# Patient Record
Sex: MALE | Race: WHITE | NOT HISPANIC OR LATINO | Employment: FULL TIME | URBAN - METROPOLITAN AREA
[De-identification: names, ages, dates, MRNs, and addresses within clinical notes are randomized per-mention and may not be internally consistent; named-entity substitution may affect disease eponyms.]

---

## 2020-05-13 ENCOUNTER — HOSPITAL ENCOUNTER (INPATIENT)
Facility: MEDICAL CENTER | Age: 60
LOS: 22 days | DRG: 100 | End: 2020-06-05
Attending: EMERGENCY MEDICINE | Admitting: HOSPITALIST
Payer: OTHER MISCELLANEOUS

## 2020-05-13 ENCOUNTER — APPOINTMENT (OUTPATIENT)
Dept: RADIOLOGY | Facility: MEDICAL CENTER | Age: 60
DRG: 100 | End: 2020-05-13
Attending: EMERGENCY MEDICINE
Payer: OTHER MISCELLANEOUS

## 2020-05-13 DIAGNOSIS — G93.40 ENCEPHALOPATHY ACUTE: ICD-10-CM

## 2020-05-13 DIAGNOSIS — E87.6 HYPOKALEMIA: ICD-10-CM

## 2020-05-13 DIAGNOSIS — I10 ESSENTIAL HYPERTENSION: ICD-10-CM

## 2020-05-13 DIAGNOSIS — C71.9 GBM (GLIOBLASTOMA MULTIFORME) (HCC): ICD-10-CM

## 2020-05-13 DIAGNOSIS — R73.9 HYPERGLYCEMIA: ICD-10-CM

## 2020-05-13 DIAGNOSIS — D72.829 LEUKOCYTOSIS, UNSPECIFIED TYPE: ICD-10-CM

## 2020-05-13 DIAGNOSIS — G40.901 STATUS EPILEPTICUS (HCC): ICD-10-CM

## 2020-05-13 DIAGNOSIS — E87.1 HYPONATREMIA: ICD-10-CM

## 2020-05-13 LAB
ABO GROUP BLD: NORMAL
ANISOCYTOSIS BLD QL SMEAR: ABNORMAL
APTT PPP: 26 SEC (ref 24.7–36)
BASOPHILS # BLD AUTO: 0.9 % (ref 0–1.8)
BASOPHILS # BLD: 0.05 K/UL (ref 0–0.12)
BLD GP AB SCN SERPL QL: NORMAL
EOSINOPHIL # BLD AUTO: 0 K/UL (ref 0–0.51)
EOSINOPHIL NFR BLD: 0 % (ref 0–6.9)
ERYTHROCYTE [DISTWIDTH] IN BLOOD BY AUTOMATED COUNT: 49.1 FL (ref 35.9–50)
HCT VFR BLD AUTO: 40.3 % (ref 42–52)
HGB BLD-MCNC: 14 G/DL (ref 14–18)
INR PPP: 0.87 (ref 0.87–1.13)
LYMPHOCYTES # BLD AUTO: 0.95 K/UL (ref 1–4.8)
LYMPHOCYTES NFR BLD: 15.6 % (ref 22–41)
MANUAL DIFF BLD: NORMAL
MCH RBC QN AUTO: 32 PG (ref 27–33)
MCHC RBC AUTO-ENTMCNC: 34.7 G/DL (ref 33.7–35.3)
MCV RBC AUTO: 92.2 FL (ref 81.4–97.8)
MICROCYTES BLD QL SMEAR: ABNORMAL
MONOCYTES # BLD AUTO: 0.37 K/UL (ref 0–0.85)
MONOCYTES NFR BLD AUTO: 6.1 % (ref 0–13.4)
MORPHOLOGY BLD-IMP: NORMAL
MYELOCYTES NFR BLD MANUAL: 1.7 %
NEUTROPHILS # BLD AUTO: 4.62 K/UL (ref 1.82–7.42)
NEUTROPHILS NFR BLD: 74.8 % (ref 44–72)
NEUTS BAND NFR BLD MANUAL: 0.9 % (ref 0–10)
NRBC # BLD AUTO: 0 K/UL
NRBC BLD-RTO: 0 /100 WBC
OVALOCYTES BLD QL SMEAR: NORMAL
PLATELET # BLD AUTO: 155 K/UL (ref 164–446)
PLATELET BLD QL SMEAR: NORMAL
PMV BLD AUTO: 10.6 FL (ref 9–12.9)
POIKILOCYTOSIS BLD QL SMEAR: NORMAL
POLYCHROMASIA BLD QL SMEAR: NORMAL
PROTHROMBIN TIME: 12 SEC (ref 12–14.6)
RBC # BLD AUTO: 4.37 M/UL (ref 4.7–6.1)
RBC BLD AUTO: PRESENT
RH BLD: NORMAL
WBC # BLD AUTO: 6.1 K/UL (ref 4.8–10.8)

## 2020-05-13 PROCEDURE — 85007 BL SMEAR W/DIFF WBC COUNT: CPT

## 2020-05-13 PROCEDURE — 86850 RBC ANTIBODY SCREEN: CPT

## 2020-05-13 PROCEDURE — 70498 CT ANGIOGRAPHY NECK: CPT

## 2020-05-13 PROCEDURE — 85027 COMPLETE CBC AUTOMATED: CPT

## 2020-05-13 PROCEDURE — 94760 N-INVAS EAR/PLS OXIMETRY 1: CPT

## 2020-05-13 PROCEDURE — 96375 TX/PRO/DX INJ NEW DRUG ADDON: CPT

## 2020-05-13 PROCEDURE — 84484 ASSAY OF TROPONIN QUANT: CPT

## 2020-05-13 PROCEDURE — 80053 COMPREHEN METABOLIC PANEL: CPT

## 2020-05-13 PROCEDURE — 85730 THROMBOPLASTIN TIME PARTIAL: CPT

## 2020-05-13 PROCEDURE — 700111 HCHG RX REV CODE 636 W/ 250 OVERRIDE (IP): Performed by: EMERGENCY MEDICINE

## 2020-05-13 PROCEDURE — 36415 COLL VENOUS BLD VENIPUNCTURE: CPT

## 2020-05-13 PROCEDURE — 71045 X-RAY EXAM CHEST 1 VIEW: CPT

## 2020-05-13 PROCEDURE — 93005 ELECTROCARDIOGRAM TRACING: CPT | Performed by: EMERGENCY MEDICINE

## 2020-05-13 PROCEDURE — 85610 PROTHROMBIN TIME: CPT

## 2020-05-13 PROCEDURE — 86900 BLOOD TYPING SEROLOGIC ABO: CPT

## 2020-05-13 PROCEDURE — 99285 EMERGENCY DEPT VISIT HI MDM: CPT

## 2020-05-13 PROCEDURE — 70496 CT ANGIOGRAPHY HEAD: CPT

## 2020-05-13 PROCEDURE — 70450 CT HEAD/BRAIN W/O DYE: CPT

## 2020-05-13 PROCEDURE — 86901 BLOOD TYPING SEROLOGIC RH(D): CPT

## 2020-05-13 PROCEDURE — 700117 HCHG RX CONTRAST REV CODE 255: Performed by: EMERGENCY MEDICINE

## 2020-05-13 RX ORDER — DEXAMETHASONE SODIUM PHOSPHATE 4 MG/ML
4 INJECTION, SOLUTION INTRA-ARTICULAR; INTRALESIONAL; INTRAMUSCULAR; INTRAVENOUS; SOFT TISSUE ONCE
Status: COMPLETED | OUTPATIENT
Start: 2020-05-13 | End: 2020-05-13

## 2020-05-13 RX ADMIN — DEXAMETHASONE SODIUM PHOSPHATE 4 MG: 4 INJECTION, SOLUTION INTRA-ARTICULAR; INTRALESIONAL; INTRAMUSCULAR; INTRAVENOUS; SOFT TISSUE at 23:56

## 2020-05-13 RX ADMIN — IOHEXOL 80 ML: 350 INJECTION, SOLUTION INTRAVENOUS at 22:54

## 2020-05-14 PROBLEM — I10 ESSENTIAL HYPERTENSION: Status: ACTIVE | Noted: 2020-05-14

## 2020-05-14 PROBLEM — R56.9 SEIZURE (HCC): Status: ACTIVE | Noted: 2020-05-14

## 2020-05-14 PROBLEM — C71.9 GBM (GLIOBLASTOMA MULTIFORME) (HCC): Status: ACTIVE | Noted: 2020-05-14

## 2020-05-14 LAB
ABO + RH BLD: NORMAL
ALBUMIN SERPL BCP-MCNC: 3.6 G/DL (ref 3.2–4.9)
ALBUMIN/GLOB SERPL: 2 G/DL
ALP SERPL-CCNC: 34 U/L (ref 30–99)
ALT SERPL-CCNC: 59 U/L (ref 2–50)
ANION GAP SERPL CALC-SCNC: 12 MMOL/L (ref 7–16)
AST SERPL-CCNC: 55 U/L (ref 12–45)
BILIRUB SERPL-MCNC: 0.3 MG/DL (ref 0.1–1.5)
BUN SERPL-MCNC: 25 MG/DL (ref 8–22)
CALCIUM SERPL-MCNC: 7.9 MG/DL (ref 8.5–10.5)
CHLORIDE SERPL-SCNC: 104 MMOL/L (ref 96–112)
CO2 SERPL-SCNC: 25 MMOL/L (ref 20–33)
CREAT SERPL-MCNC: 0.95 MG/DL (ref 0.5–1.4)
EKG IMPRESSION: NORMAL
EKG IMPRESSION: NORMAL
GLOBULIN SER CALC-MCNC: 1.8 G/DL (ref 1.9–3.5)
GLUCOSE SERPL-MCNC: 127 MG/DL (ref 65–99)
POTASSIUM SERPL-SCNC: 3.8 MMOL/L (ref 3.6–5.5)
PROT SERPL-MCNC: 5.4 G/DL (ref 6–8.2)
SODIUM SERPL-SCNC: 141 MMOL/L (ref 135–145)
TROPONIN T SERPL-MCNC: 16 NG/L (ref 6–19)

## 2020-05-14 PROCEDURE — 700102 HCHG RX REV CODE 250 W/ 637 OVERRIDE(OP): Performed by: PSYCHIATRY & NEUROLOGY

## 2020-05-14 PROCEDURE — 99253 IP/OBS CNSLTJ NEW/EST LOW 45: CPT | Mod: 25 | Performed by: PSYCHIATRY & NEUROLOGY

## 2020-05-14 PROCEDURE — 93005 ELECTROCARDIOGRAM TRACING: CPT | Performed by: EMERGENCY MEDICINE

## 2020-05-14 PROCEDURE — 95720 EEG PHY/QHP EA INCR W/VEEG: CPT | Performed by: PSYCHIATRY & NEUROLOGY

## 2020-05-14 PROCEDURE — C9254 INJECTION, LACOSAMIDE: HCPCS | Performed by: PSYCHIATRY & NEUROLOGY

## 2020-05-14 PROCEDURE — 700111 HCHG RX REV CODE 636 W/ 250 OVERRIDE (IP): Performed by: HOSPITALIST

## 2020-05-14 PROCEDURE — 700101 HCHG RX REV CODE 250: Performed by: PSYCHIATRY & NEUROLOGY

## 2020-05-14 PROCEDURE — 95714 VEEG EA 12-26 HR UNMNTR: CPT | Performed by: PSYCHIATRY & NEUROLOGY

## 2020-05-14 PROCEDURE — 700105 HCHG RX REV CODE 258: Performed by: PSYCHIATRY & NEUROLOGY

## 2020-05-14 PROCEDURE — A9270 NON-COVERED ITEM OR SERVICE: HCPCS | Performed by: HOSPITALIST

## 2020-05-14 PROCEDURE — A9270 NON-COVERED ITEM OR SERVICE: HCPCS | Performed by: PSYCHIATRY & NEUROLOGY

## 2020-05-14 PROCEDURE — 770001 HCHG ROOM/CARE - MED/SURG/GYN PRIV*

## 2020-05-14 PROCEDURE — 700102 HCHG RX REV CODE 250 W/ 637 OVERRIDE(OP): Performed by: HOSPITALIST

## 2020-05-14 PROCEDURE — 95700 EEG CONT REC W/VID EEG TECH: CPT | Performed by: PSYCHIATRY & NEUROLOGY

## 2020-05-14 PROCEDURE — 96365 THER/PROPH/DIAG IV INF INIT: CPT

## 2020-05-14 PROCEDURE — 4A10X4Z MONITORING OF CENTRAL NERVOUS ELECTRICAL ACTIVITY, EXTERNAL APPROACH: ICD-10-PCS | Performed by: PSYCHIATRY & NEUROLOGY

## 2020-05-14 PROCEDURE — 99223 1ST HOSP IP/OBS HIGH 75: CPT | Performed by: HOSPITALIST

## 2020-05-14 PROCEDURE — 700105 HCHG RX REV CODE 258: Performed by: HOSPITALIST

## 2020-05-14 PROCEDURE — 700111 HCHG RX REV CODE 636 W/ 250 OVERRIDE (IP): Performed by: PSYCHIATRY & NEUROLOGY

## 2020-05-14 PROCEDURE — C9254 INJECTION, LACOSAMIDE: HCPCS | Performed by: HOSPITALIST

## 2020-05-14 RX ORDER — DEXAMETHASONE 4 MG/1
4 TABLET ORAL EVERY 12 HOURS
Status: DISCONTINUED | OUTPATIENT
Start: 2020-05-14 | End: 2020-05-15

## 2020-05-14 RX ORDER — VALPROATE SODIUM 100 MG/ML
1000 INJECTION INTRAVENOUS ONCE
Status: COMPLETED | OUTPATIENT
Start: 2020-05-14 | End: 2020-05-14

## 2020-05-14 RX ORDER — LACOSAMIDE 50 MG/1
200 TABLET ORAL 2 TIMES DAILY
Status: DISCONTINUED | OUTPATIENT
Start: 2020-05-14 | End: 2020-05-14

## 2020-05-14 RX ORDER — OXYCODONE HYDROCHLORIDE 5 MG/1
2.5 TABLET ORAL
Status: DISCONTINUED | OUTPATIENT
Start: 2020-05-14 | End: 2020-05-16

## 2020-05-14 RX ORDER — POTASSIUM CHLORIDE 20 MEQ/1
20 TABLET, EXTENDED RELEASE ORAL DAILY
Status: ON HOLD | COMMUNITY
End: 2020-06-05

## 2020-05-14 RX ORDER — CLONIDINE HYDROCHLORIDE 0.1 MG/1
0.1 TABLET ORAL EVERY 6 HOURS PRN
Status: DISCONTINUED | OUTPATIENT
Start: 2020-05-14 | End: 2020-05-14

## 2020-05-14 RX ORDER — TOPIRAMATE 100 MG/1
200 TABLET, FILM COATED ORAL EVERY 12 HOURS
Status: DISCONTINUED | OUTPATIENT
Start: 2020-05-14 | End: 2020-05-16

## 2020-05-14 RX ORDER — DEXAMETHASONE 0.5 MG/1
0.25 TABLET ORAL DAILY
Status: ON HOLD | COMMUNITY
End: 2020-06-05 | Stop reason: SDUPTHER

## 2020-05-14 RX ORDER — AMOXICILLIN 250 MG
2 CAPSULE ORAL 2 TIMES DAILY
Status: DISCONTINUED | OUTPATIENT
Start: 2020-05-14 | End: 2020-05-15

## 2020-05-14 RX ORDER — LOSARTAN POTASSIUM 50 MG/1
100 TABLET ORAL DAILY
Status: DISCONTINUED | OUTPATIENT
Start: 2020-05-14 | End: 2020-05-16

## 2020-05-14 RX ORDER — POLYETHYLENE GLYCOL 3350 17 G/17G
1 POWDER, FOR SOLUTION ORAL
Status: DISCONTINUED | OUTPATIENT
Start: 2020-05-14 | End: 2020-05-15

## 2020-05-14 RX ORDER — LEVETIRACETAM 500 MG/1
1500 TABLET ORAL 2 TIMES DAILY
Status: DISCONTINUED | OUTPATIENT
Start: 2020-05-14 | End: 2020-05-14

## 2020-05-14 RX ORDER — ACETAMINOPHEN 325 MG/1
650 TABLET ORAL EVERY 6 HOURS PRN
Status: DISCONTINUED | OUTPATIENT
Start: 2020-05-14 | End: 2020-05-16

## 2020-05-14 RX ORDER — PROMETHAZINE HYDROCHLORIDE 25 MG/1
12.5-25 TABLET ORAL EVERY 4 HOURS PRN
Status: DISCONTINUED | OUTPATIENT
Start: 2020-05-14 | End: 2020-05-16

## 2020-05-14 RX ORDER — CHLORTHALIDONE 25 MG/1
25 TABLET ORAL DAILY
Status: DISCONTINUED | OUTPATIENT
Start: 2020-05-14 | End: 2020-05-14

## 2020-05-14 RX ORDER — HYDRALAZINE HYDROCHLORIDE 20 MG/ML
10 INJECTION INTRAMUSCULAR; INTRAVENOUS EVERY 6 HOURS PRN
Status: DISCONTINUED | OUTPATIENT
Start: 2020-05-14 | End: 2020-06-05 | Stop reason: HOSPADM

## 2020-05-14 RX ORDER — PROCHLORPERAZINE EDISYLATE 5 MG/ML
5-10 INJECTION INTRAMUSCULAR; INTRAVENOUS EVERY 4 HOURS PRN
Status: DISCONTINUED | OUTPATIENT
Start: 2020-05-14 | End: 2020-05-27

## 2020-05-14 RX ORDER — SULFAMETHOXAZOLE AND TRIMETHOPRIM 800; 160 MG/1; MG/1
1 TABLET ORAL SEE ADMIN INSTRUCTIONS
Status: ON HOLD | COMMUNITY
End: 2020-06-05

## 2020-05-14 RX ORDER — MORPHINE SULFATE 4 MG/ML
2 INJECTION, SOLUTION INTRAMUSCULAR; INTRAVENOUS
Status: DISCONTINUED | OUTPATIENT
Start: 2020-05-14 | End: 2020-05-16 | Stop reason: ALTCHOICE

## 2020-05-14 RX ORDER — ONDANSETRON 2 MG/ML
4 INJECTION INTRAMUSCULAR; INTRAVENOUS EVERY 4 HOURS PRN
Status: DISCONTINUED | OUTPATIENT
Start: 2020-05-14 | End: 2020-06-05 | Stop reason: HOSPADM

## 2020-05-14 RX ORDER — LABETALOL HYDROCHLORIDE 5 MG/ML
INJECTION, SOLUTION INTRAVENOUS
Status: DISCONTINUED
Start: 2020-05-14 | End: 2020-05-14

## 2020-05-14 RX ORDER — SODIUM CHLORIDE 9 MG/ML
INJECTION, SOLUTION INTRAVENOUS CONTINUOUS
Status: DISCONTINUED | OUTPATIENT
Start: 2020-05-14 | End: 2020-05-14

## 2020-05-14 RX ORDER — ONDANSETRON 4 MG/1
4 TABLET, ORALLY DISINTEGRATING ORAL EVERY 4 HOURS PRN
Status: DISCONTINUED | OUTPATIENT
Start: 2020-05-14 | End: 2020-05-16

## 2020-05-14 RX ORDER — LOSARTAN POTASSIUM 100 MG/1
100 TABLET ORAL DAILY
COMMUNITY

## 2020-05-14 RX ORDER — OXYCODONE HYDROCHLORIDE 5 MG/1
5 TABLET ORAL
Status: DISCONTINUED | OUTPATIENT
Start: 2020-05-14 | End: 2020-05-16

## 2020-05-14 RX ORDER — PROMETHAZINE HYDROCHLORIDE 25 MG/1
12.5-25 SUPPOSITORY RECTAL EVERY 4 HOURS PRN
Status: DISCONTINUED | OUTPATIENT
Start: 2020-05-14 | End: 2020-05-27

## 2020-05-14 RX ORDER — BISACODYL 10 MG
10 SUPPOSITORY, RECTAL RECTAL
Status: DISCONTINUED | OUTPATIENT
Start: 2020-05-14 | End: 2020-05-15

## 2020-05-14 RX ORDER — LEVETIRACETAM 750 MG/1
750 TABLET ORAL 2 TIMES DAILY
Status: ON HOLD | COMMUNITY
End: 2020-06-05 | Stop reason: SDUPTHER

## 2020-05-14 RX ORDER — CHLORTHALIDONE 25 MG/1
25 TABLET ORAL DAILY
Status: ON HOLD | COMMUNITY
End: 2020-06-05

## 2020-05-14 RX ORDER — ACETAMINOPHEN/DIPHENHYDRAMINE 500MG-25MG
1 TABLET ORAL NIGHTLY PRN
Status: ON HOLD | COMMUNITY
End: 2020-06-05

## 2020-05-14 RX ORDER — VALPROATE SODIUM 100 MG/ML
750 INJECTION INTRAVENOUS 2 TIMES DAILY
Status: DISCONTINUED | OUTPATIENT
Start: 2020-05-14 | End: 2020-05-14

## 2020-05-14 RX ORDER — LORAZEPAM 2 MG/ML
4 INJECTION INTRAMUSCULAR
Status: DISCONTINUED | OUTPATIENT
Start: 2020-05-14 | End: 2020-06-05 | Stop reason: HOSPADM

## 2020-05-14 RX ADMIN — LORAZEPAM 4 MG: 2 INJECTION INTRAMUSCULAR; INTRAVENOUS at 17:39

## 2020-05-14 RX ADMIN — LOSARTAN POTASSIUM 100 MG: 50 TABLET, FILM COATED ORAL at 16:43

## 2020-05-14 RX ADMIN — SODIUM CHLORIDE 200 MG: 9 INJECTION, SOLUTION INTRAVENOUS at 17:38

## 2020-05-14 RX ADMIN — SODIUM CHLORIDE 200 MG: 9 INJECTION, SOLUTION INTRAVENOUS at 06:10

## 2020-05-14 RX ADMIN — LEVETIRACETAM 1500 MG: 100 INJECTION, SOLUTION, CONCENTRATE INTRAVENOUS at 09:14

## 2020-05-14 RX ADMIN — SODIUM CHLORIDE 400 MG: 9 INJECTION, SOLUTION INTRAVENOUS at 00:06

## 2020-05-14 RX ADMIN — DEXAMETHASONE 4 MG: 4 TABLET ORAL at 20:49

## 2020-05-14 RX ADMIN — ENOXAPARIN SODIUM 40 MG: 100 INJECTION SUBCUTANEOUS at 05:59

## 2020-05-14 RX ADMIN — SODIUM CHLORIDE: 9 INJECTION, SOLUTION INTRAVENOUS at 05:53

## 2020-05-14 RX ADMIN — VALPROATE SODIUM 1000 MG: 500 INJECTION INTRAVENOUS at 13:15

## 2020-05-14 RX ADMIN — CHLORTHALIDONE 25 MG: 25 TABLET ORAL at 05:59

## 2020-05-14 RX ADMIN — SODIUM CHLORIDE 100 MG: 9 INJECTION, SOLUTION INTRAVENOUS at 19:38

## 2020-05-14 RX ADMIN — TOPIRAMATE 200 MG: 100 TABLET, FILM COATED ORAL at 20:50

## 2020-05-14 RX ADMIN — LEVETIRACETAM 1500 MG: 100 INJECTION, SOLUTION, CONCENTRATE INTRAVENOUS at 18:29

## 2020-05-14 ASSESSMENT — LIFESTYLE VARIABLES
DO YOU DRINK ALCOHOL: NO
HAVE PEOPLE ANNOYED YOU BY CRITICIZING YOUR DRINKING: NO
TOTAL SCORE: 0
CONSUMPTION TOTAL: NEGATIVE
HAVE YOU EVER FELT YOU SHOULD CUT DOWN ON YOUR DRINKING: NO
ON A TYPICAL DAY WHEN YOU DRINK ALCOHOL HOW MANY DRINKS DO YOU HAVE: 0
EVER FELT BAD OR GUILTY ABOUT YOUR DRINKING: NO
HOW MANY TIMES IN THE PAST YEAR HAVE YOU HAD 5 OR MORE DRINKS IN A DAY: 0
EVER_SMOKED: NEVER
TOTAL SCORE: 0
EVER HAD A DRINK FIRST THING IN THE MORNING TO STEADY YOUR NERVES TO GET RID OF A HANGOVER: NO
ALCOHOL_USE: NO
AVERAGE NUMBER OF DAYS PER WEEK YOU HAVE A DRINK CONTAINING ALCOHOL: 0
TOTAL SCORE: 0

## 2020-05-14 ASSESSMENT — ENCOUNTER SYMPTOMS
GASTROINTESTINAL NEGATIVE: 1
CARDIOVASCULAR NEGATIVE: 1
SEIZURES: 1
EYES NEGATIVE: 1
FOCAL WEAKNESS: 1
PSYCHIATRIC NEGATIVE: 1
MUSCULOSKELETAL NEGATIVE: 1
RESPIRATORY NEGATIVE: 1
CONSTITUTIONAL NEGATIVE: 1

## 2020-05-14 ASSESSMENT — PATIENT HEALTH QUESTIONNAIRE - PHQ9
2. FEELING DOWN, DEPRESSED, IRRITABLE, OR HOPELESS: NOT AT ALL
1. LITTLE INTEREST OR PLEASURE IN DOING THINGS: NOT AT ALL
SUM OF ALL RESPONSES TO PHQ9 QUESTIONS 1 AND 2: 0

## 2020-05-14 ASSESSMENT — COGNITIVE AND FUNCTIONAL STATUS - GENERAL
WALKING IN HOSPITAL ROOM: A LITTLE
PERSONAL GROOMING: A LITTLE
DRESSING REGULAR LOWER BODY CLOTHING: A LITTLE
SUGGESTED CMS G CODE MODIFIER DAILY ACTIVITY: CJ
DAILY ACTIVITIY SCORE: 22
STANDING UP FROM CHAIR USING ARMS: A LITTLE
TURNING FROM BACK TO SIDE WHILE IN FLAT BAD: A LITTLE
MOBILITY SCORE: 20
CLIMB 3 TO 5 STEPS WITH RAILING: A LITTLE
SUGGESTED CMS G CODE MODIFIER MOBILITY: CJ

## 2020-05-14 ASSESSMENT — FIBROSIS 4 INDEX: FIB4 SCORE: 2.82

## 2020-05-14 NOTE — PROGRESS NOTES
Admitted today with right facial weakness and right upper extremity weakness.  Known history of GBM who underwent craniotomy resection in February 2020.  On review of records appears to have had similar episode or symptoms in April 2020 where MRI showed cerebral edema.  He was discharged on a tapering dose of Decadron. Neurology consulted on the case, MRI of the brain with and without contrast and EEG are still pending. Contact phone of Oncologist -[phone]

## 2020-05-14 NOTE — ED NOTES
Break RN: Called admission floor and gave report to JERO Rodriguez. Transport has arrived for pt. Called pt's gf (Jerrica Salazar [phone]) and gave update per primary RN instructions.

## 2020-05-14 NOTE — ED NOTES
Med rec completed per pt's wife  Allergies reviewed    Pt was taking Bactrim DS once a day on Saturday and Sunday for 6 weeks during radiation, his last does of the course was on 5/10/2020

## 2020-05-14 NOTE — CONSULTS
Neurology Initial Consult H&P  Neurohospitalist Service, [facility]    Referring Physician: Sharla Armstrong M.D.    No chief complaint on file.      HPI: 60-year-old male brought in for right-sided weakness and seizures.  History of GBM this past year.  Had surgery sometime this year.  History is limited due to patient not able to provide.  No family at bedside.  CT and CTA head and neck showed edema with slight shift from left to right CT portion was unremarkable.  Patient in the ER had partial status seizures with right upper extremity rhythmic movement.  He has expressive aphasia currently.  Is able to follow commands.    Review of systems: In addition to what is detailed in the HPI above, (and scanned into the chart if and when applicable), all other systems reviewed and are negative.    Past Medical History:    has no past medical history on file.    FHx:  family history is not on file.    SHx:       Allergies:  Allergies not on file    Medications:    Current Facility-Administered Medications:   •  lacosamide (VIMPAT) 400 mg in  mL ivpb, 400 mg, Intravenous, Once, Jass Velazquez M.D., Last Rate: 140 mL/hr at 05/14/20 0006, 400 mg at 05/14/20 0006  No current outpatient medications on file.    Physical Examination:     Vitals:    05/13/20 2237 05/13/20 2321   BP: 137/91    Pulse: 94    Resp: 14    SpO2: 93%    Weight:  95 kg (209 lb 7 oz)       General: Patient is awake and in no acute distress  Eyes: examination of optic disks not indicated at this time  CV: RRR    NEUROLOGICAL EXAM:     Mental status: Awake, alert and fully oriented, follows commands  Speech and language: Severe expressive aphasia following commands cranial nerve exam: Pupils are equal, round and reactive to light bilaterally. Visual fields are full. Extraocular muscles are intact. Sensation in the face is intact to light touch.  Right facial droop. Hearing to finger rub equal. Palate elevates symmetrically.  Shoulder shrug is full. Tongue is midline.  Motor exam: Right side is 4-5 at best 3-5 left side 5-5   sensory exam: No sensory deficits identified   Deep tendon reflexes: Upgoing toe on right downgoing left 2+ bilateral patellar coordination: no ataxia   Gait: deferred in the ER not stable    Objective Data:    Labs:  Lab Results   Component Value Date/Time    PROTHROMBTM 12.0 05/13/2020 10:36 PM    INR 0.87 05/13/2020 10:36 PM      Lab Results   Component Value Date/Time    WBC 6.1 05/13/2020 10:36 PM    RBC 4.37 (L) 05/13/2020 10:36 PM    HEMOGLOBIN 14.0 05/13/2020 10:36 PM    HEMATOCRIT 40.3 (L) 05/13/2020 10:36 PM    MCV 92.2 05/13/2020 10:36 PM    MCH 32.0 05/13/2020 10:36 PM    MCHC 34.7 05/13/2020 10:36 PM    MPV 10.6 05/13/2020 10:36 PM    NEUTSPOLYS 74.80 (H) 05/13/2020 10:36 PM    LYMPHOCYTES 15.60 (L) 05/13/2020 10:36 PM    MONOCYTES 6.10 05/13/2020 10:36 PM    EOSINOPHILS 0.00 05/13/2020 10:36 PM    BASOPHILS 0.90 05/13/2020 10:36 PM    ANISOCYTOSIS 1+ 05/13/2020 10:36 PM      No results found for: SODIUM, POTASSIUM, CHLORIDE, CO2, GLUCOSE, BUN, CREATININE, BUNCREATRAT, GLOMRATE   No results found for: CHOLSTRLTOT, LDL, HDL, TRIGLYCERIDE    No results found for: ALKPHOSPHAT, ASTSGOT, ALTSGPT, TBILIRUBIN     Imaging/Testing:  DX-CHEST-PORTABLE (1 VIEW)   Final Result      No radiographic evidence of acute cardiopulmonary process.      CT-CTA NECK WITH & W/O-POST PROCESSING   Final Result      CT angiogram of the neck within normal limits.      CT-CTA HEAD WITH & W/O-POST PROCESS   Final Result      Left parietal mass resection with some blood vessels along the  operative bed margins. These could indicate healing response although local recurrence is also possible. Comparison with prior studies and correlation with operative history may prove    helpful to clarify      No acute arterial occlusion is identified      CT-HEAD W/O   Final Result      No acute intracranial hemorrhage is identified.      Left  parietal vertex craniotomy with underlying vasogenic edema and mass with associated mild rightward midline shift, moderate left lateral ventricular effacement            Assessment and Plan:    60-year-old male with past history of left parietal GBM status post resection a few months ago.  Not sure the dates patient cannot provide detailed history.  Presents with what appears to be focal seizures.  Patient was on Keppra and a steroid at home unsure of the doses at this time.    Plan:  1.  Load Vimpat 400 mg now followed by maintenance dose of 200 mg twice daily  2.  Keppra 1500 mg twice daily  3.  4 mg of Decadron now until we obtain home dose of steroids  4.  Seizure precautions  5.  MRI brain with and without contrast  6.  EEG      The evaluation of the patient, and recommended management, was discussed with the resident staff.     Jass Velazquez MD  Board Certified Neurology, ABPN  t) [phone]

## 2020-05-14 NOTE — ED NOTES
Patient oxygen sats in high 80's while sleeping. Placed pt on 1L of oxygen and oxygen sats mid 90's. 30 min later pt removed oxygen and back down in high 80's. Placed pt back on oxygen.

## 2020-05-14 NOTE — ASSESSMENT & PLAN NOTE
s/p Left parietal craniotomy with subtotal resection. 2/26/2020  Followed by [facility]  History of radiation therapy  Previous MRI May 17, 2020 without any acute concerns, will need comparison for new findings compared to prior MRI.  Given persistent somnolence, interval MRI requested 05/27/20 without significant changes.   Neurology team is following  Patient is on Decadron, will increase this today (6/4) 4mg BID  Transferring to [facility]

## 2020-05-14 NOTE — ED NOTES
Pt oxygen dropped to high 80's while oxygen off. Placed oxygen back on pt. No other needs at this time

## 2020-05-14 NOTE — ASSESSMENT & PLAN NOTE
Patient on 4 drug antiepileptic drug therapy including topiramate, Vimpat, Keppra, gabapentin (added 6/1)  Discussed with neurology, valproic acid stopped May 30, 2020 given hyperammonemia and transaminitis    Most recent EEG: a few brief non convulsive focal left posterior quadrant seizures during the study. No changes despite further adjustments in anti-seizure medications. 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 The findings suggest a large underlying area of cortical irritability and structural abnormality. Clinical and radiological correlation is recommended    Maintain seizure precautions  Will try increasing decadron today (6/4/) 2mg daily-->4mg BID

## 2020-05-14 NOTE — DISCHARGE PLANNING
Medical Social Work     The pt emergency contact is Jerrica Salazar (S/O) [phone]. MIRELLA spoke to Jerrica and advised her of the visitation policy [facility]. Jerrica verbalized understanding. MIRELLA provided Jerrica with the RN phone number.

## 2020-05-14 NOTE — H&P
Hospital Medicine History & Physical Note    Date of Service  5/14/2020    Primary Care Physician  No primary care provider on file.    Code Status  Full Code    Chief Complaint  Seizure     History of Presenting Illness  60 y.o. male with history of essential hypertension on  Medical regimen, GBM left parietal status post resection in Feb 2020, was apparently in his usual state of health until the day prior to admission, when he noted the onset of right sided convulsive movements and slurred speech.  He reports that this has happened with his past seizures, but that the length and intensity of this seizure was different.  He subsequently presented for evaluation.  He denies current headache, vision change, chest pain, shortness of breath, abdominal pain, nausea or vomiting.  He has no other complaints.      Review of Systems  Review of Systems   Constitutional: Negative.    HENT: Negative.    Eyes: Negative.    Respiratory: Negative.    Cardiovascular: Negative.    Gastrointestinal: Negative.    Genitourinary: Negative.    Musculoskeletal: Negative.    Skin: Negative.    Neurological: Positive for focal weakness and seizures.   Endo/Heme/Allergies: Negative.    Psychiatric/Behavioral: Negative.        Past Medical History   has a past medical history of GBM (glioblastoma multiforme) (HCC) and Hypertension.    Surgical History   has a past surgical history that includes other neurological surg.     Family History  family history includes Heart Disease in his father and mother.     Social History   reports that he has never smoked. He has never used smokeless tobacco. He reports previous alcohol use. He reports that he does not use drugs.    Allergies  No Known Allergies    Medications  Prior to Admission Medications   Prescriptions Last Dose Informant Patient Reported? Taking?   chlorthalidone (HYGROTON) 25 MG Tab  at unknown  Yes Yes   Sig: Take 25 mg by mouth every day.   dexamethasone (DECADRON) 0.5 MG Tab  at  unknown  Yes Yes   Sig: Take 0.25 mg by mouth every day.   levETIRAcetam (KEPPRA) 500 MG Tab  at unknown  Yes Yes   Sig: Take 750 mg by mouth 2 times a day.   losartan (COZAAR) 50 MG Tab  at unknown  Yes Yes   Sig: Take 100 mg by mouth every day.   potassium chloride SA (KDUR) 20 MEQ Tab CR  at unknown  Yes Yes   Sig: Take 20 mEq by mouth every day.      Facility-Administered Medications: None       Physical Exam  Pulse:  [86-94] 88  Resp:  [14-20] 19  BP: (136-178)/() 178/106  SpO2:  [89 %-96 %] 96 %    Physical Exam  Vitals signs and nursing note reviewed.   Constitutional:       General: He is not in acute distress.     Appearance: Normal appearance. He is not ill-appearing.   HENT:      Head: Normocephalic and atraumatic.      Nose: Nose normal.      Mouth/Throat:      Mouth: Mucous membranes are moist.      Pharynx: Oropharynx is clear. No oropharyngeal exudate or posterior oropharyngeal erythema.   Eyes:      Extraocular Movements: Extraocular movements intact.      Conjunctiva/sclera: Conjunctivae normal.      Pupils: Pupils are equal, round, and reactive to light.   Neck:      Musculoskeletal: Normal range of motion and neck supple. No muscular tenderness.   Cardiovascular:      Rate and Rhythm: Normal rate and regular rhythm.      Pulses: Normal pulses.      Heart sounds: Normal heart sounds. No murmur. No friction rub. No gallop.    Pulmonary:      Effort: Pulmonary effort is normal. No respiratory distress.      Breath sounds: Normal breath sounds. No wheezing, rhonchi or rales.   Abdominal:      General: Abdomen is flat. Bowel sounds are normal. There is no distension.      Palpations: Abdomen is soft. There is no mass.      Tenderness: There is no abdominal tenderness.   Musculoskeletal: Normal range of motion.         General: No swelling or deformity.   Lymphadenopathy:      Cervical: No cervical adenopathy.   Skin:     General: Skin is warm and dry.      Findings: Lesion present.    Neurological:      General: No focal deficit present.      Mental Status: He is alert and oriented to person, place, and time.      Cranial Nerves: No cranial nerve deficit.      Motor: No weakness.      Gait: Gait normal.      Comments: Slurred speech    Psychiatric:         Mood and Affect: Mood normal.         Behavior: Behavior normal.         Laboratory:  Recent Labs     05/13/20 2236   WBC 6.1   RBC 4.37*   HEMOGLOBIN 14.0   HEMATOCRIT 40.3*   MCV 92.2   MCH 32.0   MCHC 34.7   RDW 49.1   PLATELETCT 155*   MPV 10.6     Recent Labs     05/13/20  2324   SODIUM 141   POTASSIUM 3.8   CHLORIDE 104   CO2 25   GLUCOSE 127*   BUN 25*   CREATININE 0.95   CALCIUM 7.9*     Recent Labs     05/13/20 2324   ALTSGPT 59*   ASTSGOT 55*   ALKPHOSPHAT 34   TBILIRUBIN 0.3   GLUCOSE 127*     Recent Labs     05/13/20 2236   APTT 26.0   INR 0.87     No results for input(s): NTPROBNP in the last 72 hours.      Recent Labs     05/13/20  2324   TROPONINT 16       Imaging:  DX-CHEST-PORTABLE (1 VIEW)   Final Result      No radiographic evidence of acute cardiopulmonary process.      CT-CTA NECK WITH & W/O-POST PROCESSING   Final Result      CT angiogram of the neck within normal limits.      CT-CTA HEAD WITH & W/O-POST PROCESS   Final Result      Left parietal mass resection with some blood vessels along the  operative bed margins. These could indicate healing response although local recurrence is also possible. Comparison with prior studies and correlation with operative history may prove    helpful to clarify      No acute arterial occlusion is identified      CT-HEAD W/O   Final Result      No acute intracranial hemorrhage is identified.      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  This despite dosing of keppra.  Plan per neurology for MR brain with and without, IV keppra and vimpat.  EEG pending.     Essential hypertension  Assessment & Plan  Poor control with concern for medication compliance on day of admission.  Plan to continue home medications and monitor.      GBM (glioblastoma multiforme) (HCC)  Assessment & Plan  Status post resection February 21 2020 per patient.  On decadron therapy currently, with concerning for worsening associated edema as cause for current seizures.  Monitor.  Appreciate neurology recommendations.

## 2020-05-14 NOTE — PROGRESS NOTES
Reassessment later this morning.  Patient is doing better after the load Vimpat.  Still has a right facial droop.  Which is new from his baseline.  Usually has slight right side hemiparesis but without the facial droop and speech issues.  He has been tapering off his Decadron for the past 10 days was taken 1/2 mg daily yesterday.  He was on 4 mg twice daily.  I think we should go back up on the Decadron 4 mg twice daily continue the Vimpat 200 mg twice daily and then Keppra 1500 mg twice daily we will get a new MRI brain.  Patient can be discharged home as soon as he is stable and feeling up to going home and can follow-up with  [place]onc  [place].      Jass Velazquez

## 2020-05-14 NOTE — ED PROVIDER NOTES
ED Provider Note    CHIEF COMPLAINT  Possible acute stroke    HPI  Ahmet Escobedo is a 60 y.o. male who presents to the emergency department for chief complaint of right-sided weakness and slurred speech.  According to [employer] the patient has a history of glioblastoma status post resection finished his last round of chemotherapy and radiation on Monday of this week.  This evening when he was with his partner and he started to have a focal seizure of the right upper extremity which he has had in the past and is on Keppra for but then afterwards became altered and had some slurred speech which was new.  Patient is having some weakness of the right upper extremity and slurred speech as well    Neurology was at the bedside the patient is not an alteplase candidate secondary to his recent intracranial mass and neurosurgical procedure    REVIEW OF SYSTEMS  Positives as above. Pertinent negatives include fevers chills limited secondary to speech difficulty  All other review of systems are negative    PAST MEDICAL HISTORY       SOCIAL HISTORY  Social History     Tobacco Use   • Smoking status: Not on file   Substance and Sexual Activity   • Alcohol use: Not on file   • Drug use: Not on file   • Sexual activity: Not on file       SURGICAL HISTORY  patient denies any surgical history    CURRENT MEDICATIONS  Home Medications    **Home medications have not yet been reviewed for this encounter**         ALLERGIES  Allergies not on file    PHYSICAL EXAM  VITAL SIGNS: /91   Pulse 94   Resp 14   SpO2 93%    Pulse ox interpretation: I interpret this pulse ox as normal.  Constitutional: Alert in no apparent distress.  HENT: Normocephalic atraumatic, MMM  Eyes: PER, Conjunctiva normal, Non-icteric.   Neck: Normal range of motion, No tenderness, Supple, No stridor.   Cardiovascular: Regular rate and rhythm, no murmurs.   Thorax & Lungs: Normal breath sounds, No respiratory distress, No wheezing, No chest tenderness.    Abdomen: Bowel sounds normal, Soft, No tenderness, No pulsatile masses. No peritoneal signs.  Skin: Warm, Dry, No erythema, No rash.   Back: No bony tenderness, No CVA tenderness.   Extremities/MSK: Intact distal pulses, No edema, No tenderness, No cyanosis, no major deformities noted  Neurologic: Alert mild tremors of the right upper extremity with weakness in the right upper extremity about a 2 out of 5 moving right lower and left lower extremity 5 out of 5 left upper extremity 5 out of 5 slurred speech with a aphasia eyes are open mild right eyelid facial droop      DIFFERENTIAL DIAGNOSIS AND WORK UP PLAN    This is a 60 y.o. male who presents with acute focal seizure with speech difficulty this could be a Clive's paralysis however with his recent intracranial mass and surgery is not an alteplase candidate, we will go to scanner to evaluate for any intracranial hemorrhage.  The patient does not appear to be seizing at this time is not a status epilepticus but will potentially receive some antiepileptics via neurology recommendations    DIAGNOSTIC STUDIES / PROCEDURES    EKG  Results for orders placed or performed during the hospital encounter of 20   EKG (NOW)   Result Value Ref Range    Report       [facility] Emergency Dept[facility]    Test Date:  2020  Pt Name:    CLARIBEL GREENE                Department: ER  MRN:        [id number]                      Room:       [facility]  Gender:     Male                         Technician: [id number]  :        1960                   Requested By:SHARLA CASTANO  Order #:    [id number]                    Reading MD: Sharla Castano MD    Measurements  Intervals                                Axis  Rate:       91                           P:          46  WA:         180                          QRS:        -46  QRSD:       106                          T:          37  QT:         404  QTc:        498    Interpretive Statements  Sinus rhythm at a rate of  91 no ST elevations or ST depressions no abnormal T    wave inversions incomplete right bundle with a left axis deviation otherwise  normal intervals  No previous ECG available for comparison  Electronically Signed On 2020 3:30:40 PDT by Sharla Castano MD     EKG   Result Value Ref Range    Report       [facility] Emergency Dept.    Test Date:  2020  Pt Name:    CLARIBEL GREENE                Department: ER  MRN:        [id number]                      Room:       [facility] [place]  Gender:     Male                         Technician: [id number]  :        1960                   Requested By:SHARLA CASTANO  Order #:    [id number]                    Reading MD: Sharla Castano MD    Measurements  Intervals                                Axis  Rate:       91                           P:          58  AR:         196                          QRS:        -61  QRSD:       104                          T:          33  QT:         392  QTc:        483    Interpretive Statements  Sinus rhythm at a rate of 91 no ST elevations or ST depressions no abnormal T    wave inversions no pathognomonic Q waves borderline right bundle left axis  deviation  Compared to ECG 2020 23:15:57  No acute change  Electronically Signed On 2020 3:31:00 PDT by Sharla Castano MD         LABS  Pertinent Lab Findings  Thrombocytopenia with a left shift, CMP with evidence of an elevated BUN indicative of dehydration with mildly elevated LFTs normal coags      RADIOLOGY  DX-CHEST-PORTABLE (1 VIEW)   Final Result      No radiographic evidence of acute cardiopulmonary process.      CT-CTA NECK WITH & W/O-POST PROCESSING   Final Result      CT angiogram of the neck within normal limits.      CT-CTA HEAD WITH & W/O-POST PROCESS   Final Result      Left parietal mass resection with some blood vessels along the  operative bed margins. These could indicate healing response although local recurrence is also possible. Comparison  with prior studies and correlation with operative history may prove    helpful to clarify      No acute arterial occlusion is identified      CT-HEAD W/O   Final Result      No acute intracranial hemorrhage is identified.      Left parietal vertex craniotomy with underlying vasogenic edema and mass with associated mild rightward midline shift, moderate left lateral ventricular effacement        The radiologist's interpretation of all radiological studies have been reviewed by me.      COURSE & MEDICAL DECISION MAKING  Pertinent Labs & Imaging studies reviewed. (See chart for details)    11:10 PM   I reassessed patient at the bedside with neurology, going to attempt to get his medication regimen and see how much steroids he is on but this time he will be given 4 mg of Decadron as well as started on Vimpat.  Dr. Velazquez does not seem or does not suspect the patient is in status epilepticus he has had some on and off right upper extremity tremors.  But agrees with hospitalization at this time and no alteplase    12:29 AM  Spoke w Dr Carlos the hospitalist service and he has accepted the patient for hospitalization.    1:32 AM  Spoke w the patients partner  Has been decreasing steroids over the last few weeks, went down to 0.25 decadron last Monday, and received last chemo and radiation on Monday   She is hoping that once he is more stable that they can take him to his hospital in [place] where his physicians are    DISPOSITION:  Patient will be evaluated for hospitalization by Dr Carlos in guarded condition.        FINAL IMPRESSION  1. Focal seizure  2. Clive's paralysis         Electronically signed by: Sharla Armstrong M.D., 5/13/2020 10:42 PM    This dictation has been created using voice recognition software and/or scribes. The accuracy of the dictation is limited by the abilities of the software and the expertise of the scribes. I expect there may be some errors of grammar and possibly content. I made every attempt to  manually correct the errors within my dictation. However, errors related to voice recognition software and/or scribes may still exist and should be interpreted within the appropriate context.

## 2020-05-14 NOTE — PROGRESS NOTES
This technologist ran a routine EEG study that is now converting to Continuous EEG. Tech to remove CT compatible electrodes and apply MRI conditional electrodes.  Patient will also be moved to a different room, in order to gain ethernet access for the continuous study.

## 2020-05-14 NOTE — PROCEDURES
VIDEO ELECTROENCEPHALOGRAM REPORT      Referring provider: Dr. Velazquez.     DOS: 5/14/2020 (total recording of 17 hours and 50 minutes).     INDICATION:  Ahmet Escobedo 60 y.o. male presenting with h/o brain tumor, seizures.     CURRENT ANTIEPILEPTIC REGIMEN: Levetiracetam increased to 1500 mg q 12 hrs during study, Lacosamide increased to 300 mg q 12 hrs during study. Valproic Acid 1000 mg load followed by 750 mg q 12 hrs, and Topiramate added at 200 mg q 12 hrs. Midazolam prn.     TECHNIQUE: 30 channel video electroencephalogram (EEG) was performed in accordance with the international 10-20 system. The study was reviewed in bipolar and referential montages. The recording examined the patient during wakeful and drowsy/sleep state(s).     DESCRIPTION OF THE RECORD:  During the wakefulness, the background showed an asymmetrical 8 Hz alpha activity posteriorly with amplitude of 70 mV on the right and slowing in the left hemisphere.  There was reactivity to eye closure/opening.  A normal anterior-posterior gradient was noted with faster beta frequencies seen anteriorly.  During drowsiness, theta/delta frequencies were seen.    During the sleep state, background shows diffuse high-amplitude 4-5 Hz delta activity.  Asymmetrical high-amplitude sleep spindles and vertex sharps were seen in the leads over the central regions.     ACTIVATION PROCEDURES:   Not performed.     ICTAL AND/OR INTERICTAL FINDINGS:   Continuous left posterior quadrant spike, polyspikes, sharps. These exhibited a left lateralized periodic pattern during most of the eeg. Initially, frequent seizures were captured from the left posterior quadrant, with some seizures correlating with aphasia or confusion, and others without clear clinical changes. Seizures were brief (between 10-20 seconds) for the most part but recurred every few minutes initially, suggesting focal electrographic status epilepticus. With further adjustments in anti-seizure medications,  there has been noticeable improvement with seizures occurring every few hours at this point.     EKG: sampling of the EKG recording demonstrated sinus rhythm.     EVENTS:  See above.     INTERPRETATION:  This is an abnormal video EEG recording in the awake, drowsy, and sleep state(s). There is left hemisphere slowing. Continuous left posterior quadrant spike, polyspikes, sharps. These exhibited a left lateralized periodic pattern during most of the eeg. Initially, frequent seizures were captured from the left posterior quadrant, with some seizures correlating with aphasia or confusion, and others without clear clinical changes. Seizures were brief (between 10-20 seconds) for the most part but recurred every few minutes initially, suggesting focal electrographic status epilepticus. With further adjustments in anti-seizure medications, there has been noticeable improvement with seizures occurring every few hours during overnight recording.  The findings suggest a large underlying area of cortical irritability and structural abnormality. The findings suggest PLDs plus seizures. Clinical and radiological correlation is recommended.    Updates provided to Dr. Velazquez.         Davion Wise MD   Epilepsy and Neurodiagnostics.   Clinical  of Neurology [facility] of Medicine.   Diplomate in Neurology, Epilepsy, and Electrodiagnostic Medicine.   Office: [phone]  Fax: [phone]

## 2020-05-15 ENCOUNTER — APPOINTMENT (OUTPATIENT)
Dept: RADIOLOGY | Facility: MEDICAL CENTER | Age: 60
DRG: 100 | End: 2020-05-15
Attending: INTERNAL MEDICINE
Payer: OTHER MISCELLANEOUS

## 2020-05-15 ENCOUNTER — APPOINTMENT (OUTPATIENT)
Dept: RADIOLOGY | Facility: MEDICAL CENTER | Age: 60
DRG: 100 | End: 2020-05-15
Attending: HOSPITALIST
Payer: OTHER MISCELLANEOUS

## 2020-05-15 PROBLEM — J96.01 ACUTE RESPIRATORY FAILURE WITH HYPOXIA (HCC): Status: ACTIVE | Noted: 2020-05-15

## 2020-05-15 PROBLEM — D72.829 LEUKOCYTOSIS: Status: ACTIVE | Noted: 2020-05-15

## 2020-05-15 PROBLEM — E87.1 HYPONATREMIA: Status: ACTIVE | Noted: 2020-05-15

## 2020-05-15 PROBLEM — R73.9 HYPERGLYCEMIA: Status: ACTIVE | Noted: 2020-05-15

## 2020-05-15 PROBLEM — E87.6 HYPOKALEMIA: Status: ACTIVE | Noted: 2020-05-15

## 2020-05-15 PROBLEM — G40.901 STATUS EPILEPTICUS (HCC): Status: ACTIVE | Noted: 2020-05-14

## 2020-05-15 LAB
ACTION RANGE TRIGGERED IACRT: NO
ALBUMIN SERPL BCP-MCNC: 4 G/DL (ref 3.2–4.9)
ALBUMIN/GLOB SERPL: 2 G/DL
ALP SERPL-CCNC: 37 U/L (ref 30–99)
ALT SERPL-CCNC: 55 U/L (ref 2–50)
AMMONIA PLAS-SCNC: 13 UMOL/L (ref 11–45)
ANION GAP SERPL CALC-SCNC: 14 MMOL/L (ref 7–16)
ANION GAP SERPL CALC-SCNC: 15 MMOL/L (ref 7–16)
ANION GAP SERPL CALC-SCNC: 19 MMOL/L (ref 7–16)
AST SERPL-CCNC: 18 U/L (ref 12–45)
BASE EXCESS BLDA CALC-SCNC: -2 MMOL/L (ref -4–3)
BASOPHILS # BLD AUTO: 0.4 % (ref 0–1.8)
BASOPHILS # BLD: 0.04 K/UL (ref 0–0.12)
BILIRUB SERPL-MCNC: 0.4 MG/DL (ref 0.1–1.5)
BODY TEMPERATURE: ABNORMAL DEGREES
BUN SERPL-MCNC: 19 MG/DL (ref 8–22)
BUN SERPL-MCNC: 21 MG/DL (ref 8–22)
BUN SERPL-MCNC: 22 MG/DL (ref 8–22)
CALCIUM SERPL-MCNC: 8.9 MG/DL (ref 8.5–10.5)
CALCIUM SERPL-MCNC: 9.3 MG/DL (ref 8.5–10.5)
CALCIUM SERPL-MCNC: 9.6 MG/DL (ref 8.5–10.5)
CHLORIDE SERPL-SCNC: 100 MMOL/L (ref 96–112)
CHLORIDE SERPL-SCNC: 95 MMOL/L (ref 96–112)
CHLORIDE SERPL-SCNC: 96 MMOL/L (ref 96–112)
CO2 BLDA-SCNC: 25 MMOL/L (ref 20–33)
CO2 SERPL-SCNC: 21 MMOL/L (ref 20–33)
CO2 SERPL-SCNC: 22 MMOL/L (ref 20–33)
CO2 SERPL-SCNC: 24 MMOL/L (ref 20–33)
CREAT SERPL-MCNC: 0.78 MG/DL (ref 0.5–1.4)
CREAT SERPL-MCNC: 0.8 MG/DL (ref 0.5–1.4)
CREAT SERPL-MCNC: 0.91 MG/DL (ref 0.5–1.4)
EOSINOPHIL # BLD AUTO: 0.03 K/UL (ref 0–0.51)
EOSINOPHIL NFR BLD: 0.3 % (ref 0–6.9)
ERYTHROCYTE [DISTWIDTH] IN BLOOD BY AUTOMATED COUNT: 49.6 FL (ref 35.9–50)
GLOBULIN SER CALC-MCNC: 2 G/DL (ref 1.9–3.5)
GLUCOSE SERPL-MCNC: 146 MG/DL (ref 65–99)
GLUCOSE SERPL-MCNC: 183 MG/DL (ref 65–99)
GLUCOSE SERPL-MCNC: 188 MG/DL (ref 65–99)
HCO3 BLDA-SCNC: 23.9 MMOL/L (ref 17–25)
HCT VFR BLD AUTO: 41.6 % (ref 42–52)
HGB BLD-MCNC: 14.1 G/DL (ref 14–18)
HOROWITZ INDEX BLDA+IHG-RTO: 313 MM[HG]
IMM GRANULOCYTES # BLD AUTO: 0.28 K/UL (ref 0–0.11)
IMM GRANULOCYTES NFR BLD AUTO: 2.5 % (ref 0–0.9)
INST. QUALIFIED PATIENT IIQPT: YES
LYMPHOCYTES # BLD AUTO: 0.92 K/UL (ref 1–4.8)
LYMPHOCYTES NFR BLD: 8.1 % (ref 22–41)
MAGNESIUM SERPL-MCNC: 1.7 MG/DL (ref 1.5–2.5)
MCH RBC QN AUTO: 31.1 PG (ref 27–33)
MCHC RBC AUTO-ENTMCNC: 33.9 G/DL (ref 33.7–35.3)
MCV RBC AUTO: 91.6 FL (ref 81.4–97.8)
MONOCYTES # BLD AUTO: 0.66 K/UL (ref 0–0.85)
MONOCYTES NFR BLD AUTO: 5.8 % (ref 0–13.4)
NEUTROPHILS # BLD AUTO: 9.45 K/UL (ref 1.82–7.42)
NEUTROPHILS NFR BLD: 82.9 % (ref 44–72)
NRBC # BLD AUTO: 0 K/UL
NRBC BLD-RTO: 0 /100 WBC
O2/TOTAL GAS SETTING VFR VENT: 100 %
PCO2 BLDA: 43 MMHG (ref 26–37)
PCO2 TEMP ADJ BLDA: 41.2 MMHG (ref 26–37)
PH BLDA: 7.35 [PH] (ref 7.4–7.5)
PH TEMP ADJ BLDA: 7.37 [PH] (ref 7.4–7.5)
PHOSPHATE SERPL-MCNC: 2.9 MG/DL (ref 2.5–4.5)
PLATELET # BLD AUTO: 160 K/UL (ref 164–446)
PMV BLD AUTO: 10.4 FL (ref 9–12.9)
PO2 BLDA: 313 MMHG (ref 64–87)
PO2 TEMP ADJ BLDA: 308 MMHG (ref 64–87)
POTASSIUM SERPL-SCNC: 3.3 MMOL/L (ref 3.6–5.5)
POTASSIUM SERPL-SCNC: 3.7 MMOL/L (ref 3.6–5.5)
POTASSIUM SERPL-SCNC: 3.7 MMOL/L (ref 3.6–5.5)
PROT SERPL-MCNC: 6 G/DL (ref 6–8.2)
RBC # BLD AUTO: 4.54 M/UL (ref 4.7–6.1)
SAO2 % BLDA: 100 % (ref 93–99)
SODIUM SERPL-SCNC: 133 MMOL/L (ref 135–145)
SODIUM SERPL-SCNC: 133 MMOL/L (ref 135–145)
SODIUM SERPL-SCNC: 140 MMOL/L (ref 135–145)
SPECIMEN DRAWN FROM PATIENT: ABNORMAL
TSH SERPL DL<=0.005 MIU/L-ACNC: 1.16 UIU/ML (ref 0.38–5.33)
VALPROATE SERPL-MCNC: 43.7 UG/ML (ref 50–100)
WBC # BLD AUTO: 11.4 K/UL (ref 4.8–10.8)

## 2020-05-15 PROCEDURE — 95720 EEG PHY/QHP EA INCR W/VEEG: CPT | Performed by: PSYCHIATRY & NEUROLOGY

## 2020-05-15 PROCEDURE — 770022 HCHG ROOM/CARE - ICU (200)

## 2020-05-15 PROCEDURE — 94002 VENT MGMT INPAT INIT DAY: CPT

## 2020-05-15 PROCEDURE — 87206 SMEAR FLUORESCENT/ACID STAI: CPT

## 2020-05-15 PROCEDURE — C9254 INJECTION, LACOSAMIDE: HCPCS | Performed by: PSYCHIATRY & NEUROLOGY

## 2020-05-15 PROCEDURE — 0B9D8ZX DRAINAGE OF RIGHT MIDDLE LUNG LOBE, VIA NATURAL OR ARTIFICIAL OPENING ENDOSCOPIC, DIAGNOSTIC: ICD-10-PCS | Performed by: INTERNAL MEDICINE

## 2020-05-15 PROCEDURE — 80048 BASIC METABOLIC PNL TOTAL CA: CPT

## 2020-05-15 PROCEDURE — 99152 MOD SED SAME PHYS/QHP 5/>YRS: CPT

## 2020-05-15 PROCEDURE — A4314 CATH W/DRAINAGE 2-WAY LATEX: HCPCS | Performed by: INTERNAL MEDICINE

## 2020-05-15 PROCEDURE — 31500 INSERT EMERGENCY AIRWAY: CPT

## 2020-05-15 PROCEDURE — 700102 HCHG RX REV CODE 250 W/ 637 OVERRIDE(OP): Performed by: PSYCHIATRY & NEUROLOGY

## 2020-05-15 PROCEDURE — 700105 HCHG RX REV CODE 258: Performed by: FAMILY MEDICINE

## 2020-05-15 PROCEDURE — 4A10X4Z MONITORING OF CENTRAL NERVOUS ELECTRICAL ACTIVITY, EXTERNAL APPROACH: ICD-10-PCS | Performed by: PSYCHIATRY & NEUROLOGY

## 2020-05-15 PROCEDURE — 302978 HCHG BRONCHOSCOPY-DIAGNOSTIC

## 2020-05-15 PROCEDURE — 31645 BRNCHSC W/THER ASPIR 1ST: CPT | Performed by: INTERNAL MEDICINE

## 2020-05-15 PROCEDURE — 82140 ASSAY OF AMMONIA: CPT

## 2020-05-15 PROCEDURE — 700111 HCHG RX REV CODE 636 W/ 250 OVERRIDE (IP): Performed by: INTERNAL MEDICINE

## 2020-05-15 PROCEDURE — 31500 INSERT EMERGENCY AIRWAY: CPT | Performed by: INTERNAL MEDICINE

## 2020-05-15 PROCEDURE — A9270 NON-COVERED ITEM OR SERVICE: HCPCS | Performed by: PSYCHIATRY & NEUROLOGY

## 2020-05-15 PROCEDURE — 83735 ASSAY OF MAGNESIUM: CPT

## 2020-05-15 PROCEDURE — 87116 MYCOBACTERIA CULTURE: CPT

## 2020-05-15 PROCEDURE — 0BH18EZ INSERTION OF ENDOTRACHEAL AIRWAY INTO TRACHEA, VIA NATURAL OR ARTIFICIAL OPENING ENDOSCOPIC: ICD-10-PCS | Performed by: INTERNAL MEDICINE

## 2020-05-15 PROCEDURE — 80164 ASSAY DIPROPYLACETIC ACD TOT: CPT

## 2020-05-15 PROCEDURE — 99292 CRITICAL CARE ADDL 30 MIN: CPT | Mod: 25 | Performed by: INTERNAL MEDICINE

## 2020-05-15 PROCEDURE — 87205 SMEAR GRAM STAIN: CPT

## 2020-05-15 PROCEDURE — 84100 ASSAY OF PHOSPHORUS: CPT

## 2020-05-15 PROCEDURE — 80053 COMPREHEN METABOLIC PANEL: CPT

## 2020-05-15 PROCEDURE — 87015 SPECIMEN INFECT AGNT CONCNTJ: CPT

## 2020-05-15 PROCEDURE — 85025 COMPLETE CBC W/AUTO DIFF WBC: CPT

## 2020-05-15 PROCEDURE — 82803 BLOOD GASES ANY COMBINATION: CPT

## 2020-05-15 PROCEDURE — 99152 MOD SED SAME PHYS/QHP 5/>YRS: CPT | Performed by: INTERNAL MEDICINE

## 2020-05-15 PROCEDURE — 87102 FUNGUS ISOLATION CULTURE: CPT

## 2020-05-15 PROCEDURE — 99291 CRITICAL CARE FIRST HOUR: CPT | Mod: 25 | Performed by: INTERNAL MEDICINE

## 2020-05-15 PROCEDURE — 700105 HCHG RX REV CODE 258: Performed by: PSYCHIATRY & NEUROLOGY

## 2020-05-15 PROCEDURE — 700111 HCHG RX REV CODE 636 W/ 250 OVERRIDE (IP): Performed by: FAMILY MEDICINE

## 2020-05-15 PROCEDURE — 87070 CULTURE OTHR SPECIMN AEROBIC: CPT

## 2020-05-15 PROCEDURE — 700111 HCHG RX REV CODE 636 W/ 250 OVERRIDE (IP): Performed by: PSYCHIATRY & NEUROLOGY

## 2020-05-15 PROCEDURE — 84443 ASSAY THYROID STIM HORMONE: CPT

## 2020-05-15 PROCEDURE — 95714 VEEG EA 12-26 HR UNMNTR: CPT | Performed by: PSYCHIATRY & NEUROLOGY

## 2020-05-15 PROCEDURE — 700111 HCHG RX REV CODE 636 W/ 250 OVERRIDE (IP): Performed by: HOSPITALIST

## 2020-05-15 PROCEDURE — 99233 SBSQ HOSP IP/OBS HIGH 50: CPT | Mod: 25 | Performed by: PSYCHIATRY & NEUROLOGY

## 2020-05-15 PROCEDURE — 5A1955Z RESPIRATORY VENTILATION, GREATER THAN 96 CONSECUTIVE HOURS: ICD-10-PCS | Performed by: INTERNAL MEDICINE

## 2020-05-15 PROCEDURE — 700101 HCHG RX REV CODE 250: Performed by: PSYCHIATRY & NEUROLOGY

## 2020-05-15 PROCEDURE — 36600 WITHDRAWAL OF ARTERIAL BLOOD: CPT

## 2020-05-15 PROCEDURE — 99233 SBSQ HOSP IP/OBS HIGH 50: CPT | Performed by: FAMILY MEDICINE

## 2020-05-15 PROCEDURE — 31624 DX BRONCHOSCOPE/LAVAGE: CPT | Performed by: INTERNAL MEDICINE

## 2020-05-15 PROCEDURE — 700105 HCHG RX REV CODE 258: Performed by: HOSPITALIST

## 2020-05-15 RX ORDER — ETOMIDATE 2 MG/ML
20 INJECTION INTRAVENOUS ONCE
Status: COMPLETED | OUTPATIENT
Start: 2020-05-15 | End: 2020-05-15

## 2020-05-15 RX ORDER — POTASSIUM CHLORIDE 7.45 MG/ML
10 INJECTION INTRAVENOUS
Status: COMPLETED | OUTPATIENT
Start: 2020-05-15 | End: 2020-05-15

## 2020-05-15 RX ORDER — DEXAMETHASONE SODIUM PHOSPHATE 4 MG/ML
4 INJECTION, SOLUTION INTRA-ARTICULAR; INTRALESIONAL; INTRAMUSCULAR; INTRAVENOUS; SOFT TISSUE EVERY 12 HOURS
Status: DISCONTINUED | OUTPATIENT
Start: 2020-05-15 | End: 2020-05-21

## 2020-05-15 RX ORDER — SODIUM CHLORIDE 1 G/1
1 TABLET ORAL
Status: DISCONTINUED | OUTPATIENT
Start: 2020-05-15 | End: 2020-05-15

## 2020-05-15 RX ORDER — BISACODYL 10 MG
10 SUPPOSITORY, RECTAL RECTAL
Status: DISCONTINUED | OUTPATIENT
Start: 2020-05-15 | End: 2020-05-27

## 2020-05-15 RX ORDER — IPRATROPIUM BROMIDE AND ALBUTEROL SULFATE 2.5; .5 MG/3ML; MG/3ML
3 SOLUTION RESPIRATORY (INHALATION)
Status: DISCONTINUED | OUTPATIENT
Start: 2020-05-15 | End: 2020-06-05 | Stop reason: HOSPADM

## 2020-05-15 RX ORDER — MAGNESIUM SULFATE HEPTAHYDRATE 40 MG/ML
2 INJECTION, SOLUTION INTRAVENOUS ONCE
Status: COMPLETED | OUTPATIENT
Start: 2020-05-15 | End: 2020-05-15

## 2020-05-15 RX ORDER — ETOMIDATE 2 MG/ML
20 INJECTION INTRAVENOUS ONCE
Status: CANCELLED | OUTPATIENT
Start: 2020-05-15 | End: 2020-05-15

## 2020-05-15 RX ORDER — PROPOFOL 10 MG/ML
30 INJECTION, EMULSION INTRAVENOUS ONCE
Status: COMPLETED | OUTPATIENT
Start: 2020-05-15 | End: 2020-05-15

## 2020-05-15 RX ORDER — LORAZEPAM 2 MG/ML
1-2 INJECTION INTRAMUSCULAR EVERY 4 HOURS PRN
Status: DISCONTINUED | OUTPATIENT
Start: 2020-05-15 | End: 2020-05-16

## 2020-05-15 RX ORDER — FAMOTIDINE 20 MG/1
20 TABLET, FILM COATED ORAL EVERY 12 HOURS
Status: DISCONTINUED | OUTPATIENT
Start: 2020-05-15 | End: 2020-05-24

## 2020-05-15 RX ORDER — SUCCINYLCHOLINE CHLORIDE 20 MG/ML
100 INJECTION INTRAMUSCULAR; INTRAVENOUS ONCE
Status: COMPLETED | OUTPATIENT
Start: 2020-05-15 | End: 2020-05-15

## 2020-05-15 RX ORDER — POLYETHYLENE GLYCOL 3350 17 G/17G
1 POWDER, FOR SOLUTION ORAL
Status: DISCONTINUED | OUTPATIENT
Start: 2020-05-15 | End: 2020-05-27

## 2020-05-15 RX ORDER — ACETYLCYSTEINE 200 MG/ML
3 SOLUTION ORAL; RESPIRATORY (INHALATION) PRN
Status: ACTIVE | OUTPATIENT
Start: 2020-05-15 | End: 2020-05-15

## 2020-05-15 RX ORDER — POTASSIUM CHLORIDE 20 MEQ/1
20 TABLET, EXTENDED RELEASE ORAL DAILY
Status: DISCONTINUED | OUTPATIENT
Start: 2020-05-15 | End: 2020-05-15

## 2020-05-15 RX ORDER — AMOXICILLIN 250 MG
2 CAPSULE ORAL 2 TIMES DAILY
Status: DISCONTINUED | OUTPATIENT
Start: 2020-05-15 | End: 2020-05-27

## 2020-05-15 RX ORDER — 3% SODIUM CHLORIDE 3 G/100ML
INJECTION, SOLUTION INTRAVENOUS CONTINUOUS
Status: DISCONTINUED | OUTPATIENT
Start: 2020-05-15 | End: 2020-05-16

## 2020-05-15 RX ORDER — LIDOCAINE HYDROCHLORIDE 20 MG/ML
5 SOLUTION OROPHARYNGEAL PRN
Status: ACTIVE | OUTPATIENT
Start: 2020-05-15 | End: 2020-05-15

## 2020-05-15 RX ORDER — SUCCINYLCHOLINE CHLORIDE 20 MG/ML
100 INJECTION INTRAMUSCULAR; INTRAVENOUS ONCE
Status: CANCELLED | OUTPATIENT
Start: 2020-05-15 | End: 2020-05-15

## 2020-05-15 RX ORDER — LIDOCAINE HYDROCHLORIDE 20 MG/ML
5 INJECTION, SOLUTION INFILTRATION; PERINEURAL PRN
Status: ACTIVE | OUTPATIENT
Start: 2020-05-15 | End: 2020-05-15

## 2020-05-15 RX ADMIN — POTASSIUM CHLORIDE 10 MEQ: 10 INJECTION, SOLUTION INTRAVENOUS at 11:52

## 2020-05-15 RX ADMIN — PROPOFOL 30 MG: 10 INJECTION, EMULSION INTRAVENOUS at 19:14

## 2020-05-15 RX ADMIN — PROPOFOL 45 MCG/KG/MIN: 10 INJECTION, EMULSION INTRAVENOUS at 19:13

## 2020-05-15 RX ADMIN — VALPROATE SODIUM 750 MG: 100 INJECTION, SOLUTION INTRAVENOUS at 22:11

## 2020-05-15 RX ADMIN — LORAZEPAM 2 MG: 2 INJECTION INTRAMUSCULAR; INTRAVENOUS at 03:29

## 2020-05-15 RX ADMIN — SUCCINYLCHOLINE CHLORIDE 100 MG: 20 INJECTION, SOLUTION INTRAMUSCULAR; INTRAVENOUS; PARENTERAL at 19:00

## 2020-05-15 RX ADMIN — DEXAMETHASONE SODIUM PHOSPHATE 4 MG: 4 INJECTION, SOLUTION INTRA-ARTICULAR; INTRALESIONAL; INTRAMUSCULAR; INTRAVENOUS; SOFT TISSUE at 17:53

## 2020-05-15 RX ADMIN — SODIUM CHLORIDE 300 MG: 9 INJECTION, SOLUTION INTRAVENOUS at 11:32

## 2020-05-15 RX ADMIN — PROPOFOL 45 MCG/KG/MIN: 10 INJECTION, EMULSION INTRAVENOUS at 22:29

## 2020-05-15 RX ADMIN — PROPOFOL 30 MG: 10 INJECTION, EMULSION INTRAVENOUS at 19:21

## 2020-05-15 RX ADMIN — PROPOFOL 30 MG: 10 INJECTION, EMULSION INTRAVENOUS at 19:17

## 2020-05-15 RX ADMIN — SODIUM CHLORIDE: 3 INJECTION, SOLUTION INTRAVENOUS at 11:49

## 2020-05-15 RX ADMIN — VALPROATE SODIUM 750 MG: 100 INJECTION, SOLUTION INTRAVENOUS at 00:00

## 2020-05-15 RX ADMIN — ENOXAPARIN SODIUM 40 MG: 100 INJECTION SUBCUTANEOUS at 06:03

## 2020-05-15 RX ADMIN — POTASSIUM CHLORIDE 10 MEQ: 10 INJECTION, SOLUTION INTRAVENOUS at 13:04

## 2020-05-15 RX ADMIN — LEVETIRACETAM 1500 MG: 100 INJECTION, SOLUTION, CONCENTRATE INTRAVENOUS at 17:53

## 2020-05-15 RX ADMIN — VALPROATE SODIUM 750 MG: 100 INJECTION, SOLUTION INTRAVENOUS at 12:35

## 2020-05-15 RX ADMIN — LORAZEPAM 2 MG: 2 INJECTION INTRAMUSCULAR; INTRAVENOUS at 09:41

## 2020-05-15 RX ADMIN — FAMOTIDINE 20 MG: 10 INJECTION INTRAVENOUS at 22:12

## 2020-05-15 RX ADMIN — ETOMIDATE 20 MG: 2 INJECTION INTRAVENOUS at 18:59

## 2020-05-15 RX ADMIN — SODIUM CHLORIDE: 3 INJECTION, SOLUTION INTRAVENOUS at 22:16

## 2020-05-15 RX ADMIN — LEVETIRACETAM 1500 MG: 100 INJECTION, SOLUTION, CONCENTRATE INTRAVENOUS at 06:03

## 2020-05-15 RX ADMIN — FENTANYL CITRATE 50 MCG: 50 INJECTION INTRAMUSCULAR; INTRAVENOUS at 19:11

## 2020-05-15 RX ADMIN — DEXAMETHASONE 4 MG: 4 TABLET ORAL at 06:03

## 2020-05-15 RX ADMIN — MAGNESIUM SULFATE IN WATER 2 G: 40 INJECTION, SOLUTION INTRAVENOUS at 14:29

## 2020-05-15 RX ADMIN — FENTANYL CITRATE 100 MCG: 50 INJECTION INTRAMUSCULAR; INTRAVENOUS at 18:59

## 2020-05-15 RX ADMIN — LORAZEPAM 4 MG: 2 INJECTION INTRAMUSCULAR; INTRAVENOUS at 15:45

## 2020-05-15 RX ADMIN — TOPIRAMATE 200 MG: 100 TABLET, FILM COATED ORAL at 06:03

## 2020-05-15 NOTE — THERAPY
Missed Therapy     Patient Name: Ahmet Escobedo  Age:  60 y.o., Sex:  male  Medical Record #: [id number]  Today's Date: 5/15/2020    Discussed missed therapy with RN. Patient lethargic and not appropriate for swallow evaluation. RN to make pt NPO. SLP to follow tomorrow AM, as appropriate.        05/15/20 1332   Treatment Variance   Reason For Missed Therapy Medical - Patient Unarousable

## 2020-05-15 NOTE — PROGRESS NOTES
Pt found attempting to get OOB, legs over rails and had pulled off tele leads, confused (oriented to self and time) and drowsy, difficult to redirect, urine saturated in bed, attempting to pull att EEG leads as well. Pt cleaned up and multiple attempts to redirect him however he started to get more restless and agitated as I kept redirecting that he can not get OOB at this time. Attempted to assist with urinal and pt started to swat at me demanding to stand to get OOB however he is weak and drowsy. After several attempts to pull of 24 hr EEG leads I placed a page to hospitalist; Dr Carlos called back and ordered PRN ativan in a smaller dose vs what is currently ordered for seizure activity only. Ativan 2mg IV given for restlessness and agitation and to protect EEG lines/system.

## 2020-05-15 NOTE — PROGRESS NOTES
Patient had 3 minute witnessed seizure with left arm straight out, muscles bulging and left arm shaking.  Paged Dr. Pierre.  Paged Dr. Velazquez.

## 2020-05-15 NOTE — PROGRESS NOTES
Neurology Progress Note  Neurohospitalist Service, [facility]    Referring Physician: Guido Pierre M.D.    No chief complaint on file.      HPI: Refer to initial documented Neurology H&P, as detailed in the patient's chart.    Interval History May 15, 2020: Patient multiple seizures on EEG for an hour yesterday afternoon we started patient on Depakote followed by Topamax.  The EEG improved overnight having a seizure about once every 3-4 hours.  Patient currently is sedated was received Ativan a few hours ago due to agitation.  Not arousable at this time.    Review of systems: In addition to what is detailed in the HPI and/or updated in the interval history, all other systems reviewed and are negative.    Past Medical History:    has a past medical history of GBM (glioblastoma multiforme) (HCC) and Hypertension.    FHx:  family history includes Heart Disease in his father and mother.    SHx:   reports that he has never smoked. He has never used smokeless tobacco. He reports previous alcohol use. He reports that he does not use drugs.    Medications:    Current Facility-Administered Medications:   •  lacosamide (VIMPAT) 300 mg in  mL ivpb, 300 mg, Intravenous, Q12HRS, Jass Velazquez M.D.  •  LORazepam (ATIVAN) injection 1-2 mg, 1-2 mg, Intravenous, Q4HRS PRN, Igor Carlos M.D., 2 mg at 05/15/20 0329  •  sodium chloride (SALT) tablet 1 g, 1 g, Oral, TID WITH MEALS, Guido Pierre M.D.  •  potassium chloride SA (Kdur) tablet 20 mEq, 20 mEq, Oral, DAILY, Guido Pierre M.D., Stopped at 05/15/20 0815  •  losartan (COZAAR) tablet 100 mg, 100 mg, Oral, DAILY, Igor Carlos M.D., 100 mg at 05/14/20 1643  •  acetaminophen (TYLENOL) tablet 650 mg, 650 mg, Oral, Q6HRS PRN, Igor Carlos M.D.  •  Notify provider if pain remains uncontrolled, , , CONTINUOUS **AND** Use the numeric rating scale (NRS-11) on regular floors and Critical-Care Pain Observation Tool (CPOT) on  ICUs/Trauma to assess pain, , , CONTINUOUS **AND** Pulse Ox (Oximetry), , , CONTINUOUS **AND** Pharmacy Consult Request ...Pain Management Review 1 Each, 1 Each, Other, PHARMACY TO DOSE **AND** If patient difficult to arouse and/or has respiratory depression, stop any opiates that are currently infusing and call a Rapid Response., , , CONTINUOUS **AND** oxyCODONE immediate-release (ROXICODONE) tablet 2.5 mg, 2.5 mg, Oral, Q3HRS PRN **AND** oxyCODONE immediate-release (ROXICODONE) tablet 5 mg, 5 mg, Oral, Q3HRS PRN **AND** morphine (pf) 4 MG/ML injection 2 mg, 2 mg, Intravenous, Q3HRS PRN, Igor Carlos M.D.  •  ondansetron (ZOFRAN) syringe/vial injection 4 mg, 4 mg, Intravenous, Q4HRS PRN, Igor Carlos M.D.  •  ondansetron (ZOFRAN ODT) dispertab 4 mg, 4 mg, Oral, Q4HRS PRN, Igor Carlos M.D.  •  promethazine (PHENERGAN) tablet 12.5-25 mg, 12.5-25 mg, Oral, Q4HRS PRN, Igor Carlos M.D.  •  promethazine (PHENERGAN) suppository 12.5-25 mg, 12.5-25 mg, Rectal, Q4HRS PRN, Igor Carlos M.D.  •  prochlorperazine (COMPAZINE) injection 5-10 mg, 5-10 mg, Intravenous, Q4HRS PRN, Igor Carlos M.D.  •  senna-docusate (PERICOLACE or SENOKOT S) 8.6-50 MG per tablet 2 Tab, 2 Tab, Oral, BID, Stopped at 05/15/20 0600 **AND** polyethylene glycol/lytes (MIRALAX) PACKET 1 Packet, 1 Packet, Oral, QDAY PRN **AND** magnesium hydroxide (MILK OF MAGNESIA) suspension 30 mL, 30 mL, Oral, QDAY PRN **AND** bisacodyl (DULCOLAX) suppository 10 mg, 10 mg, Rectal, QDAY PRN, Igor Carlos M.D.  •  enoxaparin (LOVENOX) inj 40 mg, 40 mg, Subcutaneous, DAILY, Igor Carlos M.D., 40 mg at 05/15/20 0603  •  LORazepam (ATIVAN) injection 4 mg, 4 mg, Intravenous, Q10 MIN PRN, Igor Carlos M.D., 4 mg at 05/14/20 1739  •  levETIRAcetam (KEPPRA) 1,500 mg in  mL IVPB, 1,500 mg, Intravenous, Q12HRS, Igor Carlos M.D., Stopped at 05/15/20 0618  •  hydrALAZINE (APRESOLINE) injection 10 mg, 10 mg, Intravenous, Q6HRS PRN, Guido  MAGNOLIA Pierre  •  dexamethasone (DECADRON) tablet 4 mg, 4 mg, Oral, Q12HRS, Jass Velazquez M.D., 4 mg at 05/15/20 0603  •  topiramate (TOPAMAX) tablet 200 mg, 200 mg, Oral, Q12HRS, Jass Velazquez M.D., 200 mg at 05/15/20 0603  •  valproate (DEPACON) 750 mg in  mL IVPB, 750 mg, Intravenous, BID, Jass Velazquez M.D., Stopped at 05/15/20 0100    Physical Examination:     Vitals:    05/14/20 2000 05/14/20 2359 05/15/20 0439 05/15/20 0800   BP: 139/86 119/82 134/90 130/92   Pulse: 99 93 83 95   Resp: 18 16 18 16   Temp: 36.6 °C (97.9 °F) 36.5 °C (97.7 °F) 36.2 °C (97.1 °F) 36.6 °C (97.9 °F)   TempSrc: Temporal Temporal Temporal Temporal   SpO2: 94% 96% 95% 95%   Weight:       Height:           Patient is very lethargic this time.  Does not awake to sternal rub.  Does move all 4 extremities spontaneously.  No clinical signs of seizures.  Pupils are equal and reactive.  There is no nystagmus.  Still has a right facial droop.  Responsive noxious stimuli in all 4 extremities.  Toes, upgoing on the right.      Objective Data:    Labs:  Lab Results   Component Value Date/Time    PROTHROMBTM 12.0 05/13/2020 10:36 PM    INR 0.87 05/13/2020 10:36 PM      Lab Results   Component Value Date/Time    WBC 11.4 (H) 05/15/2020 03:31 AM    RBC 4.54 (L) 05/15/2020 03:31 AM    HEMOGLOBIN 14.1 05/15/2020 03:31 AM    HEMATOCRIT 41.6 (L) 05/15/2020 03:31 AM    MCV 91.6 05/15/2020 03:31 AM    MCH 31.1 05/15/2020 03:31 AM    MCHC 33.9 05/15/2020 03:31 AM    MPV 10.4 05/15/2020 03:31 AM    NEUTSPOLYS 82.90 (H) 05/15/2020 03:31 AM    LYMPHOCYTES 8.10 (L) 05/15/2020 03:31 AM    MONOCYTES 5.80 05/15/2020 03:31 AM    EOSINOPHILS 0.30 05/15/2020 03:31 AM    BASOPHILS 0.40 05/15/2020 03:31 AM    ANISOCYTOSIS 1+ 05/13/2020 10:36 PM      Lab Results   Component Value Date/Time    SODIUM 133 (L) 05/15/2020 03:31 AM    POTASSIUM 3.3 (L) 05/15/2020 03:31 AM    CHLORIDE 95 (L) 05/15/2020 03:31 AM    CO2 24 05/15/2020 03:31 AM    GLUCOSE 188  (H) 05/15/2020 03:31 AM    BUN 22 05/15/2020 03:31 AM    CREATININE 0.91 05/15/2020 03:31 AM      No results found for: CHOLSTRLTOT, LDL, HDL, TRIGLYCERIDE    Lab Results   Component Value Date/Time    ALKPHOSPHAT 37 05/15/2020 03:31 AM    ASTSGOT 18 05/15/2020 03:31 AM    ALTSGPT 55 (H) 05/15/2020 03:31 AM    TBILIRUBIN 0.4 05/15/2020 03:31 AM        Imaging/Testing:  DX-CHEST-PORTABLE (1 VIEW)   Final Result      No radiographic evidence of acute cardiopulmonary process.      CT-CTA NECK WITH & W/O-POST PROCESSING   Final Result      CT angiogram of the neck within normal limits.      CT-CTA HEAD WITH & W/O-POST PROCESS   Final Result      Left parietal mass resection with some blood vessels along the  operative bed margins. These could indicate healing response although local recurrence is also possible. Comparison with prior studies and correlation with operative history may prove    helpful to clarify      No acute arterial occlusion is identified      CT-HEAD W/O   Final Result      No acute intracranial hemorrhage is identified.      Left parietal vertex craniotomy with underlying vasogenic edema and mass with associated mild rightward midline shift, moderate left lateral ventricular effacement      MR-BRAIN-WITH & W/O    (Results Pending)         Assessment and Plan:  60-year-old male status post GBM resection on the left side.  Presenting with status epilepticus.  Increased edema over the left parietal region.  Patient had multiple seizures yesterday which have improved on the EEG after starting Depakote and Topamax on top of the Vimpat and Keppra.  Patient currently is very lethargic after receiving Ativan earlier this morning.  Will reevaluate the patient mentally later today    Plan:  1.  Continuous EEG  2.  Vimpat 300 mg twice daily  3.  Keppra 1500 mg twice daily  4. Depakote 750 mg twice daily  5.  Topamax 200 mg twice daily  6.  EEG leads are MRI compatible we can get MRI brain done today we will  arrange with EEG techs.  7.  Believe the increase lethargic is due to Ativan dose given earlier today will reevaluate later his afternoon.    The evaluation of the patient, and recommended management, was discussed with the resident staff. I have performed a physical exam and reviewed and updated ROS and Plan today (5/15/2020). In review of yesterday's note (5/14/2020), there are no changes except as documented above.    Jass Velazquez MD  Board Certified Neurology, ABPN  t) [phone]

## 2020-05-15 NOTE — PROCEDURES
VIDEO ELECTROENCEPHALOGRAM REPORT        Referring provider: Dr. Velazquez.      DOS: 5/15/2020 (total recording of 22 hours and 31 minutes).      INDICATION:  Ahmet Escobedo 60 y.o. male presenting with h/o brain tumor, seizures.      CURRENT ANTIEPILEPTIC REGIMEN: Levetiracetam 1500 mg q 12 hrs, Lacosamide 300 mg q 12 hrs, Valproic Acid 750 mg q 12 hrs, and Topiramate 200 mg q 12 hrs. Sedated with Propofol.      TECHNIQUE: 30 channel video electroencephalogram (EEG) was performed in accordance with the international 10-20 system. The study was reviewed in bipolar and referential montages. The recording examined the patient during wakeful and drowsy/sleep, then sedated state(s).      DESCRIPTION OF THE RECORD:  During the wakefulness, the background showed an asymmetrical 7 Hz theta activity posteriorly with amplitude of 70 mV on the right and slowing in the left hemisphere.  There was reactivity to eye closure/opening.  A normal anterior-posterior gradient was noted with faster beta frequencies seen anteriorly.  During drowsiness, theta/delta frequencies were seen.     During the sleep state, background shows diffuse high-amplitude 4-5 Hz delta activity.  Asymmetrical high-amplitude sleep spindles and vertex sharps were seen in the leads over the central regions.     With the use of sedation, waxing and waning of the cerebral electrical noted.      ACTIVATION PROCEDURES:   Not performed.      ICTAL AND/OR INTERICTAL FINDINGS:   Continuous left posterior quadrant spike, polyspikes, sharps. These exhibited a left lateralized periodic pattern during most of the eeg. Initially, frequent seizures were captured from the left posterior quadrant, with some seizures correlating with aphasia or confusion, and others without clear clinical changes. Seizures were brief (between 10-20 seconds) for the most part but recurred every few hours. With the use of sedation, no further seizures captured, however study remains  significantly abnormal.      EKG: sampling of the EKG recording demonstrated sinus rhythm.      EVENTS:  See above.      INTERPRETATION:  This is an abnormal video EEG recording in the awake, drowsy, and sleep, then sedated state(s). A mild encephalopathy is suggested. There is left hemisphere slowing. Continuous left posterior quadrant spike, polyspikes, sharps. These exhibited a left lateralized periodic pattern during most of the eeg. Initially, frequent seizures were captured from the left posterior quadrant, with some seizures correlating with aphasia or confusion, and others without clear clinical changes. Seizures were brief (between 10-20 seconds) for the most part but recurred every few hours. With the use of sedation, no further seizures captured, however study remains significantly abnormal. The findings suggest a large underlying area of cortical irritability and structural abnormality. The findings suggest PLDs plus seizures. Clinical and radiological correlation is recommended.     Updates provided to Dr. Ana Yarbrough.            Davion Wise MD   Epilepsy and Neurodiagnostics.   Clinical  of Neurology [facility] of Medicine.   Diplomate in Neurology, Epilepsy, and Electrodiagnostic Medicine.   Office: [phone]  Fax: [phone]

## 2020-05-15 NOTE — PROGRESS NOTES
Notified Dr. Velazquez of repetitive small seizers with twitching of the right hand and right face. Will give one dose of ativan.

## 2020-05-15 NOTE — PROGRESS NOTES
Hospital Medicine Daily Progress Note    Date of Service  5/15/2020    Chief Complaint  60 y.o. male admitted 5/13/2020 with Status epilepticus    Hospital Course  Admitted with status epilepticus, known history of GBM underwent craniotomy and resection.    Interval Problem Update  Status epilepticus - he received several doses of Ativan last night, lethargic today, discussed case with Neurology  GBM - records reviewed  HTN - sbp 110-130  Hyponatremia - 133  Low potassium and magnesium    Consultants/Specialty  Neurology    Code Status  Full    Disposition  TBD    Review of Systems  Review of Systems   Unable to perform ROS: Medical condition        Physical Exam  Temp:  [36.2 °C (97.1 °F)-37.1 °C (98.8 °F)] 36.6 °C (97.9 °F)  Pulse:  [] 95  Resp:  [16-18] 16  BP: (119-161)/(79-92) 130/92  SpO2:  [93 %-96 %] 95 %    Physical Exam  Constitutional:       Appearance: He is obese.   HENT:      Head: Normocephalic.      Nose: No congestion.      Mouth/Throat:      Mouth: Mucous membranes are moist.   Eyes:      Conjunctiva/sclera: Conjunctivae normal.      Pupils: Pupils are equal, round, and reactive to light.   Neck:      Musculoskeletal: No muscular tenderness.   Cardiovascular:      Rate and Rhythm: Normal rate and regular rhythm.   Pulmonary:      Effort: Pulmonary effort is normal.      Breath sounds: Normal breath sounds.   Abdominal:      General: Bowel sounds are normal. There is no distension.      Palpations: Abdomen is soft.      Tenderness: There is no abdominal tenderness. There is no guarding or rebound.   Musculoskeletal:      Right lower leg: No edema.      Left lower leg: No edema.   Lymphadenopathy:      Cervical: No cervical adenopathy.   Skin:     General: Skin is warm and dry.   Neurological:      Mental Status: He is lethargic.      Cranial Nerves: Facial asymmetry present.      Comments: Opens eyes to voice  Does not follow commands  Appears to have good  bilaterally          Fluids    Intake/Output Summary (Last 24 hours) at 5/15/2020 1156  Last data filed at 5/15/2020 0000  Gross per 24 hour   Intake 650 ml   Output 200 ml   Net 450 ml       Laboratory  Recent Labs     05/13/20  2236 05/15/20  0331   WBC 6.1 11.4*   RBC 4.37* 4.54*   HEMOGLOBIN 14.0 14.1   HEMATOCRIT 40.3* 41.6*   MCV 92.2 91.6   MCH 32.0 31.1   MCHC 34.7 33.9   RDW 49.1 49.6   PLATELETCT 155* 160*   MPV 10.6 10.4     Recent Labs     05/13/20  2324 05/15/20  0331 05/15/20  1021   SODIUM 141 133* 133*   POTASSIUM 3.8 3.3* 3.7   CHLORIDE 104 95* 96   CO2 25 24 22   GLUCOSE 127* 188* 183*   BUN 25* 22 21   CREATININE 0.95 0.91 0.80   CALCIUM 7.9* 9.3 9.6     Recent Labs     05/13/20 2236   APTT 26.0   INR 0.87               Imaging  DX-CHEST-PORTABLE (1 VIEW)   Final Result      No radiographic evidence of acute cardiopulmonary process.      CT-CTA NECK WITH & W/O-POST PROCESSING   Final Result      CT angiogram of the neck within normal limits.      CT-CTA HEAD WITH & W/O-POST PROCESS   Final Result      Left parietal mass resection with some blood vessels along the  operative bed margins. These could indicate healing response although local recurrence is also possible. Comparison with prior studies and correlation with operative history may prove    helpful to clarify      No acute arterial occlusion is identified      CT-HEAD W/O   Final Result      No acute intracranial hemorrhage is identified.      Left parietal vertex craniotomy with underlying vasogenic edema and mass with associated mild rightward midline shift, moderate left lateral ventricular effacement      MR-BRAIN-WITH & W/O    (Results Pending)        Assessment/Plan  * Status epilepticus (HCC)- (present on admission)  Assessment & Plan  IV Vimpat, Keppra, Valproate   Continuous EEG  IV Ativan prn    GBM (glioblastoma multiforme) (HCC)- (present on admission)  Assessment & Plan  s/p Left parietal craniotomy with subtotal resection. 2/26/2020  MRI  brain pending  Change to IV Decadron  Follow up with Oncology as outpatient    Hyperglycemia- (present on admission)  Assessment & Plan  Check hgba1c    Leukocytosis- (present on admission)  Assessment & Plan  Likely secondary to steroids  Follow cbc    Hypokalemia- (present on admission)  Assessment & Plan  IV KCl,  Follow bmp, Mg, Ph    Hyponatremia  Assessment & Plan  Start IVF 3% Na  Serial bmp      Essential hypertension- (present on admission)  Assessment & Plan  Losartan  IV Hydralazine as needed with parameters         VTE prophylaxis: SCD

## 2020-05-15 NOTE — PROGRESS NOTES
Patient trying to get out of bed without assistance, patient pulling at EEG lines and telemetry monitoring equipment.  Patient placed in vest and bilateral wrist restraints.  Paged Dr. Pierre.

## 2020-05-15 NOTE — PROGRESS NOTES
"Lengthy conversation with patient's significant other and daughter.  Updates given, plan of care discussed.  Neurology has also called them and given them updates. They have full understanding of the plan of care.  Their main complaint is that visitors are not currently allowed with the current COVID-19 visitor policy.  They state \"we know you are advocating for him, but we want to be present and be in the room with him to advocate for him as well.\"  I relayed their message to the director of nursing.  "

## 2020-05-15 NOTE — PROGRESS NOTES
Pt brought down for MRI. Pt very agitated. Pt attempting to get out of scanner. Legs hanging down. Pt attempting to take head piece off. Imaging supervisor advised to have this patient taken back upstairs as it is a safety issue for him trying to get off the table.

## 2020-05-15 NOTE — ASSESSMENT & PLAN NOTE
A1C 7.5   Likely steroid induced   Continue basal insulin, Lantus 12 units twice daily  Sliding scale insulin No. 2  Will titrate to achieve normal glycemic control  Hypoglycemic protocol in place

## 2020-05-15 NOTE — PROGRESS NOTES
Patient returned from MRI.  Patient not able to follow commands even after 2mg of IV Ativan.  MRI not able to be performed due to patient's inability to follow commands.

## 2020-05-15 NOTE — PROGRESS NOTES
Called EEG re MRI this morning.  EEG electrodes are compatible with MRI.  EEG tech will come and check on patient prior to MRI.

## 2020-05-15 NOTE — PROGRESS NOTES
Discussed patient's condition at length with patient's SO (Mimi) and daughter (Linh).  Informed family of restraints.  Received verbal understanding.

## 2020-05-16 ENCOUNTER — APPOINTMENT (OUTPATIENT)
Dept: RADIOLOGY | Facility: MEDICAL CENTER | Age: 60
DRG: 100 | End: 2020-05-16
Attending: INTERNAL MEDICINE
Payer: OTHER MISCELLANEOUS

## 2020-05-16 ENCOUNTER — APPOINTMENT (OUTPATIENT)
Dept: RADIOLOGY | Facility: MEDICAL CENTER | Age: 60
DRG: 100 | End: 2020-05-16
Attending: HOSPITALIST
Payer: OTHER MISCELLANEOUS

## 2020-05-16 LAB
ACTION RANGE TRIGGERED IACRT: NO
AMMONIA PLAS-SCNC: 14 UMOL/L (ref 11–45)
ANION GAP SERPL CALC-SCNC: 12 MMOL/L (ref 7–16)
ANION GAP SERPL CALC-SCNC: 14 MMOL/L (ref 7–16)
ANION GAP SERPL CALC-SCNC: 15 MMOL/L (ref 7–16)
BASE EXCESS BLDA CALC-SCNC: -4 MMOL/L (ref -4–3)
BASOPHILS # BLD AUTO: 0.2 % (ref 0–1.8)
BASOPHILS # BLD: 0.02 K/UL (ref 0–0.12)
BODY TEMPERATURE: ABNORMAL DEGREES
BUN SERPL-MCNC: 20 MG/DL (ref 8–22)
BUN SERPL-MCNC: 21 MG/DL (ref 8–22)
BUN SERPL-MCNC: 21 MG/DL (ref 8–22)
CALCIUM SERPL-MCNC: 8 MG/DL (ref 8.5–10.5)
CALCIUM SERPL-MCNC: 8.4 MG/DL (ref 8.5–10.5)
CALCIUM SERPL-MCNC: 9 MG/DL (ref 8.5–10.5)
CHLORIDE SERPL-SCNC: 104 MMOL/L (ref 96–112)
CHLORIDE SERPL-SCNC: 107 MMOL/L (ref 96–112)
CHLORIDE SERPL-SCNC: 109 MMOL/L (ref 96–112)
CO2 BLDA-SCNC: 21 MMOL/L (ref 20–33)
CO2 SERPL-SCNC: 18 MMOL/L (ref 20–33)
CO2 SERPL-SCNC: 22 MMOL/L (ref 20–33)
CO2 SERPL-SCNC: 22 MMOL/L (ref 20–33)
CREAT SERPL-MCNC: 0.62 MG/DL (ref 0.5–1.4)
CREAT SERPL-MCNC: 0.74 MG/DL (ref 0.5–1.4)
CREAT SERPL-MCNC: 0.78 MG/DL (ref 0.5–1.4)
EOSINOPHIL # BLD AUTO: 0 K/UL (ref 0–0.51)
EOSINOPHIL NFR BLD: 0 % (ref 0–6.9)
ERYTHROCYTE [DISTWIDTH] IN BLOOD BY AUTOMATED COUNT: 51.4 FL (ref 35.9–50)
EST. AVERAGE GLUCOSE BLD GHB EST-MCNC: 169 MG/DL
FUNGUS SPEC FUNGUS STN: NORMAL
GLUCOSE SERPL-MCNC: 130 MG/DL (ref 65–99)
GLUCOSE SERPL-MCNC: 154 MG/DL (ref 65–99)
GLUCOSE SERPL-MCNC: 164 MG/DL (ref 65–99)
GRAM STN SPEC: NORMAL
HBA1C MFR BLD: 7.5 % (ref 0–5.6)
HCO3 BLDA-SCNC: 20.5 MMOL/L (ref 17–25)
HCT VFR BLD AUTO: 42.9 % (ref 42–52)
HGB BLD-MCNC: 14.1 G/DL (ref 14–18)
HOROWITZ INDEX BLDA+IHG-RTO: 340 MM[HG]
IMM GRANULOCYTES # BLD AUTO: 0.2 K/UL (ref 0–0.11)
IMM GRANULOCYTES NFR BLD AUTO: 1.6 % (ref 0–0.9)
INST. QUALIFIED PATIENT IIQPT: YES
LYMPHOCYTES # BLD AUTO: 0.72 K/UL (ref 1–4.8)
LYMPHOCYTES NFR BLD: 5.6 % (ref 22–41)
MAGNESIUM SERPL-MCNC: 2 MG/DL (ref 1.5–2.5)
MCH RBC QN AUTO: 31.2 PG (ref 27–33)
MCHC RBC AUTO-ENTMCNC: 32.9 G/DL (ref 33.7–35.3)
MCV RBC AUTO: 94.9 FL (ref 81.4–97.8)
MONOCYTES # BLD AUTO: 0.66 K/UL (ref 0–0.85)
MONOCYTES NFR BLD AUTO: 5.2 % (ref 0–13.4)
NEUTROPHILS # BLD AUTO: 11.21 K/UL (ref 1.82–7.42)
NEUTROPHILS NFR BLD: 87.4 % (ref 44–72)
NRBC # BLD AUTO: 0 K/UL
NRBC BLD-RTO: 0 /100 WBC
O2/TOTAL GAS SETTING VFR VENT: 40 %
PCO2 BLDA: 33.6 MMHG (ref 26–37)
PCO2 TEMP ADJ BLDA: 32.2 MMHG (ref 26–37)
PH BLDA: 7.39 [PH] (ref 7.4–7.5)
PH TEMP ADJ BLDA: 7.41 [PH] (ref 7.4–7.5)
PHOSPHATE SERPL-MCNC: 3.8 MG/DL (ref 2.5–4.5)
PLATELET # BLD AUTO: 183 K/UL (ref 164–446)
PMV BLD AUTO: 10.4 FL (ref 9–12.9)
PO2 BLDA: 136 MMHG (ref 64–87)
PO2 TEMP ADJ BLDA: 130 MMHG (ref 64–87)
POTASSIUM SERPL-SCNC: 3.5 MMOL/L (ref 3.6–5.5)
POTASSIUM SERPL-SCNC: 3.6 MMOL/L (ref 3.6–5.5)
POTASSIUM SERPL-SCNC: 3.7 MMOL/L (ref 3.6–5.5)
RBC # BLD AUTO: 4.52 M/UL (ref 4.7–6.1)
RHODAMINE-AURAMINE STN SPEC: NORMAL
SAO2 % BLDA: 99 % (ref 93–99)
SIGNIFICANT IND 70042: NORMAL
SITE SITE: NORMAL
SODIUM SERPL-SCNC: 137 MMOL/L (ref 135–145)
SODIUM SERPL-SCNC: 143 MMOL/L (ref 135–145)
SODIUM SERPL-SCNC: 143 MMOL/L (ref 135–145)
SOURCE SOURCE: NORMAL
SPECIMEN DRAWN FROM PATIENT: ABNORMAL
VALPROATE SERPL-MCNC: 74.6 UG/ML (ref 50–100)
WBC # BLD AUTO: 12.8 K/UL (ref 4.8–10.8)

## 2020-05-16 PROCEDURE — 700105 HCHG RX REV CODE 258: Performed by: FAMILY MEDICINE

## 2020-05-16 PROCEDURE — 94770 HCHG CO2 EXPIRED GAS DETERMINATION: CPT

## 2020-05-16 PROCEDURE — 700111 HCHG RX REV CODE 636 W/ 250 OVERRIDE (IP): Performed by: PSYCHIATRY & NEUROLOGY

## 2020-05-16 PROCEDURE — A9270 NON-COVERED ITEM OR SERVICE: HCPCS | Performed by: INTERNAL MEDICINE

## 2020-05-16 PROCEDURE — 94003 VENT MGMT INPAT SUBQ DAY: CPT

## 2020-05-16 PROCEDURE — 02HV33Z INSERTION OF INFUSION DEVICE INTO SUPERIOR VENA CAVA, PERCUTANEOUS APPROACH: ICD-10-PCS | Performed by: INTERNAL MEDICINE

## 2020-05-16 PROCEDURE — 99233 SBSQ HOSP IP/OBS HIGH 50: CPT | Mod: 25 | Performed by: PSYCHIATRY & NEUROLOGY

## 2020-05-16 PROCEDURE — 700111 HCHG RX REV CODE 636 W/ 250 OVERRIDE (IP): Performed by: FAMILY MEDICINE

## 2020-05-16 PROCEDURE — 85025 COMPLETE CBC W/AUTO DIFF WBC: CPT

## 2020-05-16 PROCEDURE — 36556 INSERT NON-TUNNEL CV CATH: CPT | Mod: RT | Performed by: INTERNAL MEDICINE

## 2020-05-16 PROCEDURE — 82140 ASSAY OF AMMONIA: CPT

## 2020-05-16 PROCEDURE — 80164 ASSAY DIPROPYLACETIC ACD TOT: CPT

## 2020-05-16 PROCEDURE — 700105 HCHG RX REV CODE 258: Performed by: PSYCHIATRY & NEUROLOGY

## 2020-05-16 PROCEDURE — 700111 HCHG RX REV CODE 636 W/ 250 OVERRIDE (IP): Performed by: HOSPITALIST

## 2020-05-16 PROCEDURE — 95720 EEG PHY/QHP EA INCR W/VEEG: CPT | Performed by: PSYCHIATRY & NEUROLOGY

## 2020-05-16 PROCEDURE — 71045 X-RAY EXAM CHEST 1 VIEW: CPT

## 2020-05-16 PROCEDURE — B548ZZA ULTRASONOGRAPHY OF SUPERIOR VENA CAVA, GUIDANCE: ICD-10-PCS | Performed by: INTERNAL MEDICINE

## 2020-05-16 PROCEDURE — C9254 INJECTION, LACOSAMIDE: HCPCS | Performed by: PSYCHIATRY & NEUROLOGY

## 2020-05-16 PROCEDURE — 99292 CRITICAL CARE ADDL 30 MIN: CPT | Mod: 25 | Performed by: INTERNAL MEDICINE

## 2020-05-16 PROCEDURE — 770022 HCHG ROOM/CARE - ICU (200)

## 2020-05-16 PROCEDURE — 80048 BASIC METABOLIC PNL TOTAL CA: CPT

## 2020-05-16 PROCEDURE — 95714 VEEG EA 12-26 HR UNMNTR: CPT | Performed by: PSYCHIATRY & NEUROLOGY

## 2020-05-16 PROCEDURE — 700105 HCHG RX REV CODE 258: Performed by: HOSPITALIST

## 2020-05-16 PROCEDURE — 84100 ASSAY OF PHOSPHORUS: CPT

## 2020-05-16 PROCEDURE — 700105 HCHG RX REV CODE 258: Performed by: INTERNAL MEDICINE

## 2020-05-16 PROCEDURE — 700102 HCHG RX REV CODE 250 W/ 637 OVERRIDE(OP): Performed by: INTERNAL MEDICINE

## 2020-05-16 PROCEDURE — 83735 ASSAY OF MAGNESIUM: CPT

## 2020-05-16 PROCEDURE — 82803 BLOOD GASES ANY COMBINATION: CPT

## 2020-05-16 PROCEDURE — 99291 CRITICAL CARE FIRST HOUR: CPT | Mod: 25 | Performed by: INTERNAL MEDICINE

## 2020-05-16 PROCEDURE — 83036 HEMOGLOBIN GLYCOSYLATED A1C: CPT

## 2020-05-16 PROCEDURE — 700101 HCHG RX REV CODE 250: Performed by: INTERNAL MEDICINE

## 2020-05-16 PROCEDURE — 700111 HCHG RX REV CODE 636 W/ 250 OVERRIDE (IP): Performed by: INTERNAL MEDICINE

## 2020-05-16 PROCEDURE — 36600 WITHDRAWAL OF ARTERIAL BLOOD: CPT

## 2020-05-16 PROCEDURE — 4A10X4Z MONITORING OF CENTRAL NERVOUS ELECTRICAL ACTIVITY, EXTERNAL APPROACH: ICD-10-PCS | Performed by: PSYCHIATRY & NEUROLOGY

## 2020-05-16 RX ORDER — 3% SODIUM CHLORIDE 3 G/100ML
500 INJECTION, SOLUTION INTRAVENOUS CONTINUOUS
Status: DISCONTINUED | OUTPATIENT
Start: 2020-05-16 | End: 2020-05-18

## 2020-05-16 RX ORDER — OXYCODONE HYDROCHLORIDE 5 MG/1
2.5 TABLET ORAL
Status: DISCONTINUED | OUTPATIENT
Start: 2020-05-16 | End: 2020-05-27

## 2020-05-16 RX ORDER — SODIUM CHLORIDE, SODIUM LACTATE, POTASSIUM CHLORIDE, CALCIUM CHLORIDE 600; 310; 30; 20 MG/100ML; MG/100ML; MG/100ML; MG/100ML
1000 INJECTION, SOLUTION INTRAVENOUS ONCE
Status: COMPLETED | OUTPATIENT
Start: 2020-05-16 | End: 2020-05-16

## 2020-05-16 RX ORDER — ONDANSETRON 4 MG/1
4 TABLET, ORALLY DISINTEGRATING ORAL EVERY 4 HOURS PRN
Status: DISCONTINUED | OUTPATIENT
Start: 2020-05-16 | End: 2020-06-05 | Stop reason: HOSPADM

## 2020-05-16 RX ORDER — ACETAMINOPHEN 325 MG/1
650 TABLET ORAL EVERY 6 HOURS PRN
Status: DISCONTINUED | OUTPATIENT
Start: 2020-05-16 | End: 2020-06-05 | Stop reason: HOSPADM

## 2020-05-16 RX ORDER — TOPIRAMATE 100 MG/1
200 TABLET, FILM COATED ORAL EVERY 12 HOURS
Status: DISCONTINUED | OUTPATIENT
Start: 2020-05-16 | End: 2020-06-05 | Stop reason: HOSPADM

## 2020-05-16 RX ORDER — OXYCODONE HYDROCHLORIDE 5 MG/1
5 TABLET ORAL
Status: DISCONTINUED | OUTPATIENT
Start: 2020-05-16 | End: 2020-05-27

## 2020-05-16 RX ORDER — PROMETHAZINE HYDROCHLORIDE 25 MG/1
12.5-25 TABLET ORAL EVERY 4 HOURS PRN
Status: DISCONTINUED | OUTPATIENT
Start: 2020-05-16 | End: 2020-05-27

## 2020-05-16 RX ORDER — LOSARTAN POTASSIUM 50 MG/1
100 TABLET ORAL DAILY
Status: DISCONTINUED | OUTPATIENT
Start: 2020-05-16 | End: 2020-06-05 | Stop reason: HOSPADM

## 2020-05-16 RX ADMIN — DOCUSATE SODIUM 50 MG AND SENNOSIDES 8.6 MG 2 TABLET: 8.6; 5 TABLET, FILM COATED ORAL at 17:29

## 2020-05-16 RX ADMIN — PROPOFOL 50 MCG/KG/MIN: 10 INJECTION, EMULSION INTRAVENOUS at 19:41

## 2020-05-16 RX ADMIN — SODIUM CHLORIDE 300 MG: 9 INJECTION, SOLUTION INTRAVENOUS at 20:59

## 2020-05-16 RX ADMIN — DEXAMETHASONE SODIUM PHOSPHATE 4 MG: 4 INJECTION, SOLUTION INTRA-ARTICULAR; INTRALESIONAL; INTRAMUSCULAR; INTRAVENOUS; SOFT TISSUE at 05:00

## 2020-05-16 RX ADMIN — SODIUM CHLORIDE 300 MG: 9 INJECTION, SOLUTION INTRAVENOUS at 00:22

## 2020-05-16 RX ADMIN — SODIUM CHLORIDE: 3 INJECTION, SOLUTION INTRAVENOUS at 08:19

## 2020-05-16 RX ADMIN — LEVETIRACETAM 1500 MG: 100 INJECTION, SOLUTION, CONCENTRATE INTRAVENOUS at 17:29

## 2020-05-16 RX ADMIN — PROPOFOL 40 MCG/KG/MIN: 10 INJECTION, EMULSION INTRAVENOUS at 06:46

## 2020-05-16 RX ADMIN — LEVETIRACETAM 1500 MG: 100 INJECTION, SOLUTION, CONCENTRATE INTRAVENOUS at 05:00

## 2020-05-16 RX ADMIN — DEXAMETHASONE SODIUM PHOSPHATE 4 MG: 4 INJECTION, SOLUTION INTRA-ARTICULAR; INTRALESIONAL; INTRAMUSCULAR; INTRAVENOUS; SOFT TISSUE at 17:29

## 2020-05-16 RX ADMIN — FAMOTIDINE 20 MG: 20 TABLET ORAL at 17:29

## 2020-05-16 RX ADMIN — FAMOTIDINE 20 MG: 10 INJECTION INTRAVENOUS at 05:00

## 2020-05-16 RX ADMIN — VALPROATE SODIUM 750 MG: 100 INJECTION, SOLUTION INTRAVENOUS at 10:55

## 2020-05-16 RX ADMIN — SODIUM CHLORIDE 300 MG: 9 INJECTION, SOLUTION INTRAVENOUS at 08:40

## 2020-05-16 RX ADMIN — PROPOFOL 40 MCG/KG/MIN: 10 INJECTION, EMULSION INTRAVENOUS at 02:17

## 2020-05-16 RX ADMIN — TOPIRAMATE 200 MG: 100 TABLET, FILM COATED ORAL at 15:40

## 2020-05-16 RX ADMIN — VALPROATE SODIUM 750 MG: 100 INJECTION, SOLUTION INTRAVENOUS at 20:59

## 2020-05-16 RX ADMIN — SODIUM CHLORIDE, POTASSIUM CHLORIDE, SODIUM LACTATE AND CALCIUM CHLORIDE 1000 ML: 600; 310; 30; 20 INJECTION, SOLUTION INTRAVENOUS at 14:19

## 2020-05-16 RX ADMIN — PROPOFOL 40 MCG/KG/MIN: 10 INJECTION, EMULSION INTRAVENOUS at 16:31

## 2020-05-16 RX ADMIN — PROPOFOL 30 MCG/KG/MIN: 10 INJECTION, EMULSION INTRAVENOUS at 10:55

## 2020-05-16 NOTE — PROCEDURES
VIDEO ELECTROENCEPHALOGRAM REPORT        Referring provider: Dr. Velazquez.      DOS: 5/16/2020 (total recording of 23 hours and 43 minutes).      INDICATION:  Ahmet Escobedo 60 y.o. male presenting with h/o brain tumor, seizures.      CURRENT ANTIEPILEPTIC REGIMEN: Levetiracetam 1500 mg q 12 hrs, Lacosamide 300 mg q 12 hrs, Valproic Acid 750 mg q 12 hrs, and Topiramate 200 mg q 12 hrs. Sedated with Propofol.      TECHNIQUE: 30 channel video electroencephalogram (EEG) was performed in accordance with the international 10-20 system. The study was reviewed in bipolar and referential montages. The recording examined a sedated patient.      DESCRIPTION OF THE RECORD:  Waxing and waning of the cerebral electrical activity. There is slowing in the left hemisphere. Continuous left posterior quadrant spikes/polyspikes and sharps noted throughout the study, exhibiting a left lateralized periodic pattern, but no seizures captured.      ACTIVATION PROCEDURES:   Not performed.      EKG: sampling of the EKG recording demonstrated sinus rhythm.      EVENTS: None.      INTERPRETATION:  This is an abnormal video EEG recording in a sedated patient. There is slowing in the left hemisphere. Continuous left posterior quadrant spikes/polyspikes and sharps noted throughout the study, exhibiting a left lateralized periodic pattern, but no seizures captured.  The patient remains at risk for seizure recurrence. The findings suggest a large underlying area of cortical irritability and structural abnormality. Clinical and radiological correlation is recommended.     Updates provided to Dr. Ana Yarbrough.            Davion Wise MD   Epilepsy and Neurodiagnostics.   Clinical  of Neurology [facility].   Diplomate in Neurology, Epilepsy, and Electrodiagnostic Medicine.   Office: [phone]  Fax: [phone]

## 2020-05-16 NOTE — PROGRESS NOTES
Patient transferred to R116 by ICU RN and ICU tech.  Called patient's daughter Linh at [phone] to update her on transfer.  Received verbal understanding from Linh.

## 2020-05-16 NOTE — PROGRESS NOTES
Neurology Progress Note  Neurohospitalist Service, [facility]    Referring Physician: Guido Pierre M.D.    No chief complaint on file.      HPI: Refer to initial documented Neurology H&P, as detailed in the patient's chart.    Interval History May 16, 2020: Yesterday patient had few seizures on EEG.  However was very lethargic.  Nursing staff noticed 3 to 4-minute EEG around 4:30 PM.  He was given 4 mg Ativan.  Afterwards he was transferred to ICU.  Then intubated for airway protection.  Overnight while on profile drip have been no seizures.  However there is still frequent left-sided sharp contorted ways.    Review of systems: In addition to what is detailed in the HPI and/or updated in the interval history, all other systems reviewed and are negative.    Past Medical History:    has a past medical history of GBM (glioblastoma multiforme) (HCC) and Hypertension.    FHx:  family history includes Heart Disease in his father and mother.    SHx:   reports that he has never smoked. He has never used smokeless tobacco. He reports previous alcohol use. He reports that he does not use drugs.    Medications:    Current Facility-Administered Medications:   •  lacosamide (VIMPAT) 300 mg in  mL ivpb, 300 mg, Intravenous, Q12HRS, Jass Velazquez M.D., Last Rate: 130 mL/hr at 05/16/20 0840, 300 mg at 05/16/20 0840  •  LORazepam (ATIVAN) injection 1-2 mg, 1-2 mg, Intravenous, Q4HRS PRN, Igor Carlos M.D., 2 mg at 05/15/20 0941  •  3% sodium chloride (HYPERTONIC SALINE) 500mL infusion, , Intravenous, Continuous, Guido Pierre M.D., Last Rate: 30 mL/hr at 05/16/20 0819  •  dexamethasone (DECADRON) injection 4 mg, 4 mg, Intravenous, Q12HRS, Guido Pierre M.D., 4 mg at 05/16/20 0500  •  Respiratory Therapy Consult, , Nebulization, Continuous RT, Nikhil Erickson D.O.  •  ipratropium-albuterol (DUONEB) nebulizer solution, 3 mL, Nebulization, Q2HRS PRN (RT), Nikhil Erickson D.O.  •  famotidine  (PEPCID) tablet 20 mg, 20 mg, Enteral Tube, Q12HRS **OR** famotidine (PEPCID) injection 20 mg, 20 mg, Intravenous, Q12HRS, Nikhil Erickson D.O., 20 mg at 05/16/20 0500  •  senna-docusate (PERICOLACE or SENOKOT S) 8.6-50 MG per tablet 2 Tab, 2 Tab, Enteral Tube, BID, Stopped at 05/15/20 1900 **AND** polyethylene glycol/lytes (MIRALAX) PACKET 1 Packet, 1 Packet, Enteral Tube, QDAY PRN **AND** magnesium hydroxide (MILK OF MAGNESIA) suspension 30 mL, 30 mL, Enteral Tube, QDAY PRN **AND** bisacodyl (DULCOLAX) suppository 10 mg, 10 mg, Rectal, QDAY PRN, Nikhil Erickson D.O.  •  MD Alert...ICU Electrolyte Replacement per Pharmacy, , Other, PHARMACY TO DOSE, Nikhil Erickson D.O.  •  lidocaine (XYLOCAINE) 1 % injection 1-2 mL, 1-2 mL, Tracheal Tube, Q30 MIN PRN, Nikhil Erickson D.O.  •  fentaNYL (SUBLIMAZE) 50 mcg/mL in 50mL (Continuous Infusion), 0-300 mcg/hr, Intravenous, Continuous, Nikhil Erickson D.O., Stopped at 05/15/20 1930  •  propofol (DIPRIVAN) injection, 0-80 mcg/kg/min, Intravenous, Continuous, Last Rate: 22.5 mL/hr at 05/16/20 0646, 40 mcg/kg/min at 05/16/20 0646 **AND** Triglycerides Starting now and then Every 3 Days, , , Every 3 Days (0300), Nikhil Erickson D.O.  •  lidocaine (XYLOCAINE) 1 % injection 0.5 mL, 0.5 mL, Intradermal, Once PRN, Nikhil Erickson D.O.  •  losartan (COZAAR) tablet 100 mg, 100 mg, Oral, DAILY, Igor Carlos M.D., Stopped at 05/15/20 1800  •  acetaminophen (TYLENOL) tablet 650 mg, 650 mg, Oral, Q6HRS PRN, Igor Adamsr, M.D.  •  Notify provider if pain remains uncontrolled, , , CONTINUOUS **AND** Use the numeric rating scale (NRS-11) on regular floors and Critical-Care Pain Observation Tool (CPOT) on ICUs/Trauma to assess pain, , , CONTINUOUS **AND** Pulse Ox (Oximetry), , , CONTINUOUS **AND** Pharmacy Consult Request ...Pain Management Review 1 Each, 1 Each, Other, PHARMACY TO DOSE **AND** If patient difficult to arouse and/or has respiratory depression, stop any opiates that are currently infusing and call a  Rapid Response., , , CONTINUOUS **AND** oxyCODONE immediate-release (ROXICODONE) tablet 2.5 mg, 2.5 mg, Oral, Q3HRS PRN **AND** oxyCODONE immediate-release (ROXICODONE) tablet 5 mg, 5 mg, Oral, Q3HRS PRN **AND** [DISCONTINUED] morphine (pf) 4 MG/ML injection 2 mg, 2 mg, Intravenous, Q3HRS PRN, Igor Carlos M.D.  •  ondansetron (ZOFRAN) syringe/vial injection 4 mg, 4 mg, Intravenous, Q4HRS PRN, Igor Carlos M.D.  •  ondansetron (ZOFRAN ODT) dispertab 4 mg, 4 mg, Oral, Q4HRS PRN, Igor Carlos M.D.  •  promethazine (PHENERGAN) tablet 12.5-25 mg, 12.5-25 mg, Oral, Q4HRS PRN, Igor Carlos M.D.  •  promethazine (PHENERGAN) suppository 12.5-25 mg, 12.5-25 mg, Rectal, Q4HRS PRN, Igor Carlos M.D.  •  prochlorperazine (COMPAZINE) injection 5-10 mg, 5-10 mg, Intravenous, Q4HRS PRN, Igor Carlos M.D.  •  LORazepam (ATIVAN) injection 4 mg, 4 mg, Intravenous, Q10 MIN PRN, Igor Carlos M.D., 4 mg at 05/15/20 1545  •  levETIRAcetam (KEPPRA) 1,500 mg in  mL IVPB, 1,500 mg, Intravenous, Q12HRS, Igor Carlos M.D., Stopped at 05/16/20 0515  •  hydrALAZINE (APRESOLINE) injection 10 mg, 10 mg, Intravenous, Q6HRS PRN, Guido Pierre M.D.  •  topiramate (TOPAMAX) tablet 200 mg, 200 mg, Oral, Q12HRS, Jass Velazquez M.D., Stopped at 05/15/20 1800  •  valproate (DEPACON) 750 mg in  mL IVPB, 750 mg, Intravenous, BID, Jass Velazquez M.D., Stopped at 05/15/20 2311    Physical Examination:     Vitals:    05/16/20 0500 05/16/20 0600 05/16/20 0700 05/16/20 0813   BP: 106/67 140/84 130/69    Pulse: 66 72 67 83   Resp: (!) 24 (!) 21 (!) 23 (!) 29   Temp: 35.9 °C (96.7 °F)      TempSrc:       SpO2: 100% 100% 100% 100%   Weight:       Height:           Patient is now intubated sedated.  Does not awake.  Does not follow commands.  Pupils are small but reactive.  There is no nystagmus.  Negative doll's eye.  Positive gag reflex.  Positive corneal reflex.  Areflexic.  No clonus.  Does not move extremities to  noxious stimuli.      Objective Data:    Labs:  Lab Results   Component Value Date/Time    PROTHROMBTM 12.0 05/13/2020 10:36 PM    INR 0.87 05/13/2020 10:36 PM      Lab Results   Component Value Date/Time    WBC 12.8 (H) 05/16/2020 03:53 AM    RBC 4.52 (L) 05/16/2020 03:53 AM    HEMOGLOBIN 14.1 05/16/2020 03:53 AM    HEMATOCRIT 42.9 05/16/2020 03:53 AM    MCV 94.9 05/16/2020 03:53 AM    MCH 31.2 05/16/2020 03:53 AM    MCHC 32.9 (L) 05/16/2020 03:53 AM    MPV 10.4 05/16/2020 03:53 AM    NEUTSPOLYS 87.40 (H) 05/16/2020 03:53 AM    LYMPHOCYTES 5.60 (L) 05/16/2020 03:53 AM    MONOCYTES 5.20 05/16/2020 03:53 AM    EOSINOPHILS 0.00 05/16/2020 03:53 AM    BASOPHILS 0.20 05/16/2020 03:53 AM    ANISOCYTOSIS 1+ 05/13/2020 10:36 PM      Lab Results   Component Value Date/Time    SODIUM 137 05/16/2020 03:53 AM    POTASSIUM 3.7 05/16/2020 03:53 AM    CHLORIDE 104 05/16/2020 03:53 AM    CO2 18 (L) 05/16/2020 03:53 AM    GLUCOSE 154 (H) 05/16/2020 03:53 AM    BUN 20 05/16/2020 03:53 AM    CREATININE 0.62 05/16/2020 03:53 AM      No results found for: CHOLSTRLTOT, LDL, HDL, TRIGLYCERIDE    Lab Results   Component Value Date/Time    ALKPHOSPHAT 37 05/15/2020 03:31 AM    ASTSGOT 18 05/15/2020 03:31 AM    ALTSGPT 55 (H) 05/15/2020 03:31 AM    TBILIRUBIN 0.4 05/15/2020 03:31 AM        Imaging/Testing:  DX-CHEST-PORTABLE (1 VIEW)   Final Result      1.  Interval placement of an endotracheal tube which terminates in satisfactory position at the level of the aortic arch.   2.  Mild right basilar atelectasis and/or consolidation.      DX-CHEST-PORTABLE (1 VIEW)   Final Result      No radiographic evidence of acute cardiopulmonary process.      CT-CTA NECK WITH & W/O-POST PROCESSING   Final Result      CT angiogram of the neck within normal limits.      CT-CTA HEAD WITH & W/O-POST PROCESS   Final Result      Left parietal mass resection with some blood vessels along the  operative bed margins. These could indicate healing response although  local recurrence is also possible. Comparison with prior studies and correlation with operative history may prove    helpful to clarify      No acute arterial occlusion is identified      CT-HEAD W/O   Final Result      No acute intracranial hemorrhage is identified.      Left parietal vertex craniotomy with underlying vasogenic edema and mass with associated mild rightward midline shift, moderate left lateral ventricular effacement      MR-BRAIN-WITH & W/O    (Results Pending)         Assessment and Plan:  60-year-old male status post GBM resection on the left side.  Presenting with status epilepticus.  Increased edema over the left parietal region.  In the past day patient became lethargic not responsive at times.  Was given Ativan for observed seizure by nursing staff yesterday afternoon.  After which she was transferred to ICU and intubated for airway protection.  On continuous EEG.  While on propofol drip there has been no seizures.  However there is still very highly contorted sharp waves on the left side.  Will need to continue sedation.    Plan:  1.  Continuous EEG  2.  Vimpat 300 mg twice daily  3.  Keppra 1500 mg twice daily  4. Depakote 750 mg twice daily  5.  Topamax 200 mg twice daily  6.  EEG leads are MRI compatible we can get MRI brain done today we will arrange with EEG techs.  7.  Continue propofol sedation for the next 24 hours.      The evaluation of the patient, and recommended management, was discussed with the resident staff. I have performed a physical exam and reviewed and updated ROS and Plan today (5/16/2020). In review of yesterday's note (5/15/2020), there are no changes except as documented above.    Jass Velazquez MD  Board Certified Neurology, ABPN  (t) [phone]

## 2020-05-16 NOTE — PROGRESS NOTES
Brunilda Rogers, ICU manager, to allow patient's SO Jerrica and patient's daughter to visit patient for an hour d/t change in condition. Family updated and will be here at approximately 2000.

## 2020-05-16 NOTE — CONSULTS
Critical Care Consultation    Date of consult: 5/15/2020    Referring Physician  Guido Pierre M.D.    Reason for Consultation  seizures    History of Presenting Illness  61yo male with hx of GBM s/p resection in 2/2020, hx of seizures now admitted for recurrent seizures. Pt is on keppra 750mg PO BID and low dose dexamethasone 0.25mg PO daily at home. He presented after having right sided convulsive movements and slurred speech.     Pt was started on topamax, and valproate, and vimpat since admission. Keppra dose was increased to 1500 Q12H. On dexamethasone 4 Q12H. Given ativan of total 6mg since admission. Pt is somnolent and given the quick escalation of his antiseizure meds to control his seizures, pt's transferred to ICU for further management.  Upon ICU arrival pt was obtunded    Code Status  Full Code    Review of Systems  Review of Systems   Reason unable to perform ROS: intubated, sedated, RASS -4, unable to perform.       Past Medical History   has a past medical history of GBM (glioblastoma multiforme) (HCC) and Hypertension.    Surgical History   has a past surgical history that includes other neurological surg.    Family History  family history includes Heart Disease in his father and mother.    Social History   reports that he has never smoked. He has never used smokeless tobacco. He reports previous alcohol use. He reports that he does not use drugs.    Medications  Home Medications     Reviewed by Vera Campoverde (Pharmacy Tech) on 05/14/20 at 0916  Med List Status: Complete   Medication Last Dose Status   chlorthalidone (HYGROTON) 25 MG Tab 5/13/2020 Active   dexamethasone (DECADRON) 0.5 MG Tab 5/13/2020 Active   diphenhydrAMINE-APAP, sleep, (TYLENOL PM EXTRA STRENGTH)  MG Tab 5/12/2020 Active   levetiracetam (KEPPRA) 750 MG tablet 5/13/2020 Active   losartan (COZAAR) 100 MG Tab 5/12/2020 Active   potassium chloride SA (KDUR) 20 MEQ Tab CR 5/12/2020 Active   sulfamethoxazole-trimethoprim  (BACTRIM DS) 800-160 MG tablet 5/10/2020 Active              Current Facility-Administered Medications   Medication Dose Route Frequency Provider Last Rate Last Dose   • lacosamide (VIMPAT) 300 mg in  mL ivpb  300 mg Intravenous Q12HRS Jass Velazquez M.D.   Stopped at 05/15/20 1232   • LORazepam (ATIVAN) injection 1-2 mg  1-2 mg Intravenous Q4HRS PRN Igor Carlos M.D.   2 mg at 05/15/20 0941   • 3% sodium chloride (HYPERTONIC SALINE) 500mL infusion   Intravenous Continuous Guido Pierre M.D. 30 mL/hr at 05/15/20 1149     • dexamethasone (DECADRON) injection 4 mg  4 mg Intravenous Q12HRS Guido Pierre M.D.   4 mg at 05/15/20 1753   • losartan (COZAAR) tablet 100 mg  100 mg Oral DAILY Igor Carlos M.D.   Stopped at 05/15/20 1800   • acetaminophen (TYLENOL) tablet 650 mg  650 mg Oral Q6HRS PRN Igor Carlos M.D.       • Pharmacy Consult Request ...Pain Management Review 1 Each  1 Each Other PHARMACY TO DOSE Igor Carlos M.D.        And   • oxyCODONE immediate-release (ROXICODONE) tablet 2.5 mg  2.5 mg Oral Q3HRS PRN Igor Carlos M.D.        And   • oxyCODONE immediate-release (ROXICODONE) tablet 5 mg  5 mg Oral Q3HRS PRN Igor Carlos M.D.        And   • morphine (pf) 4 MG/ML injection 2 mg  2 mg Intravenous Q3HRS PRN Igor Carlos M.D.       • ondansetron (ZOFRAN) syringe/vial injection 4 mg  4 mg Intravenous Q4HRS PRN Igor Carlos M.D.       • ondansetron (ZOFRAN ODT) dispertab 4 mg  4 mg Oral Q4HRS PRN Igor Carlos M.D.       • promethazine (PHENERGAN) tablet 12.5-25 mg  12.5-25 mg Oral Q4HRS PRN Igor Carlos M.D.       • promethazine (PHENERGAN) suppository 12.5-25 mg  12.5-25 mg Rectal Q4HRS PRN Igor Carlos M.D.       • prochlorperazine (COMPAZINE) injection 5-10 mg  5-10 mg Intravenous Q4HRS PRN Igor Carlos M.D.       • senna-docusate (PERICOLACE or SENOKOT S) 8.6-50 MG per tablet 2 Tab  2 Tab Oral BID Igor Carlos M.D.   Stopped at 05/15/20 0600    And   •  polyethylene glycol/lytes (MIRALAX) PACKET 1 Packet  1 Packet Oral QDAY PRN Igor Carlos M.D.        And   • magnesium hydroxide (MILK OF MAGNESIA) suspension 30 mL  30 mL Oral QDAY PRN Igor Carlos M.D.        And   • bisacodyl (DULCOLAX) suppository 10 mg  10 mg Rectal QDAY PRN Igor Carlos M.D.       • LORazepam (ATIVAN) injection 4 mg  4 mg Intravenous Q10 MIN PRN Igor Carlos M.D.   4 mg at 05/15/20 1545   • levETIRAcetam (KEPPRA) 1,500 mg in  mL IVPB  1,500 mg Intravenous Q12HRS Igor Carlos M.D. 400 mL/hr at 05/15/20 1753 1,500 mg at 05/15/20 1753   • hydrALAZINE (APRESOLINE) injection 10 mg  10 mg Intravenous Q6HRS PRN Guido Pierre M.D.       • topiramate (TOPAMAX) tablet 200 mg  200 mg Oral Q12HRS Jass Velazquez M.D.   Stopped at 05/15/20 1800   • valproate (DEPACON) 750 mg in  mL IVPB  750 mg Intravenous BID Jass Velazquez M.D.   Stopped at 05/15/20 1335       Allergies  No Known Allergies    Vital Signs last 24 hours  Temp:  [36.2 °C (97.1 °F)-36.8 °C (98.2 °F)] 36.6 °C (97.9 °F)  Pulse:  [83-99] 87  Resp:  [16-24] 24  BP: (119-139)/(82-94) 134/94  SpO2:  [91 %-96 %] 91 %    Physical Exam  Physical Exam  Vitals signs and nursing note reviewed.   Constitutional:       General: He is not in acute distress.     Appearance: He is not ill-appearing, toxic-appearing or diaphoretic.      Comments: Obtunded, not arousable  Nonpurposeful movement with agitation   HENT:      Head: Normocephalic and atraumatic.      Comments: EEG leads are in place     Mouth/Throat:      Mouth: Mucous membranes are dry.   Neck:      Musculoskeletal: Neck supple.   Cardiovascular:      Rate and Rhythm: Normal rate and regular rhythm.      Pulses: Normal pulses.      Heart sounds: Normal heart sounds. No murmur.   Pulmonary:      Effort: Pulmonary effort is normal. No respiratory distress.      Breath sounds: Normal breath sounds. No wheezing, rhonchi or rales.      Comments: Diminished at  bases  Abdominal:      General: Bowel sounds are normal. There is no distension.      Palpations: Abdomen is soft. There is no mass.      Tenderness: There is no abdominal tenderness. There is no guarding.   Musculoskeletal:         General: No swelling or tenderness.   Skin:     General: Skin is warm.      Capillary Refill: Capillary refill takes less than 2 seconds.      Coloration: Skin is not jaundiced.   Neurological:      Cranial Nerves: No cranial nerve deficit.      Comments: Intubated, sedated         Fluids    Intake/Output Summary (Last 24 hours) at 5/15/2020 1802  Last data filed at 5/15/2020 0000  Gross per 24 hour   Intake 650 ml   Output 200 ml   Net 450 ml       Laboratory  Recent Results (from the past 48 hour(s))   CBC WITH DIFFERENTIAL    Collection Time: 05/13/20 10:36 PM   Result Value Ref Range    WBC 6.1 4.8 - 10.8 K/uL    RBC 4.37 (L) 4.70 - 6.10 M/uL    Hemoglobin 14.0 14.0 - 18.0 g/dL    Hematocrit 40.3 (L) 42.0 - 52.0 %    MCV 92.2 81.4 - 97.8 fL    MCH 32.0 27.0 - 33.0 pg    MCHC 34.7 33.7 - 35.3 g/dL    RDW 49.1 35.9 - 50.0 fL    Platelet Count 155 (L) 164 - 446 K/uL    MPV 10.6 9.0 - 12.9 fL    Neutrophils-Polys 74.80 (H) 44.00 - 72.00 %    Lymphocytes 15.60 (L) 22.00 - 41.00 %    Monocytes 6.10 0.00 - 13.40 %    Eosinophils 0.00 0.00 - 6.90 %    Basophils 0.90 0.00 - 1.80 %    Nucleated RBC 0.00 /100 WBC    Neutrophils (Absolute) 4.62 1.82 - 7.42 K/uL    Lymphs (Absolute) 0.95 (L) 1.00 - 4.80 K/uL    Monos (Absolute) 0.37 0.00 - 0.85 K/uL    Eos (Absolute) 0.00 0.00 - 0.51 K/uL    Baso (Absolute) 0.05 0.00 - 0.12 K/uL    NRBC (Absolute) 0.00 K/uL    Anisocytosis 1+     Microcytosis 1+    PROTHROMBIN TIME    Collection Time: 05/13/20 10:36 PM   Result Value Ref Range    PT 12.0 12.0 - 14.6 sec    INR 0.87 0.87 - 1.13   APTT    Collection Time: 05/13/20 10:36 PM   Result Value Ref Range    APTT 26.0 24.7 - 36.0 sec   COD (ADULT)    Collection Time: 05/13/20 10:36 PM   Result Value Ref  Range    ABO Grouping Only A     Rh Grouping Only POS     Antibody Screen-Cod NEG    MORPHOLOGY    Collection Time: 20 10:36 PM   Result Value Ref Range    RBC Morphology Present     Polychromia 1+     Poikilocytosis 1+     Ovalocytes 1+    PERIPHERAL SMEAR REVIEW    Collection Time: 20 10:36 PM   Result Value Ref Range    Peripheral Smear Review see below    DIFFERENTIAL MANUAL    Collection Time: 20 10:36 PM   Result Value Ref Range    Bands-Stabs 0.90 0.00 - 10.00 %    Myelocytes 1.70 %    Manual Diff Status PERFORMED    PLATELET ESTIMATE    Collection Time: 20 10:36 PM   Result Value Ref Range    Plt Estimation Decreased    EKG (NOW)    Collection Time: 20 11:15 PM   Result Value Ref Range    Report       [facility] Emergency Dept.    Test Date:  2020  Pt Name:    CLARIBEL GREENE                Department: ER  MRN:        [id number]                      Room:       [facility] [place]  Gender:     Male                         Technician: [id number]  :        1960                   Requested By:SHARLA CASTANO  Order #:    [id number]                    Reading MD: Sharla Castano MD    Measurements  Intervals                                Axis  Rate:       91                           P:          46  OH:         180                          QRS:        -46  QRSD:       106                          T:          37  QT:         404  QTc:        498    Interpretive Statements  Sinus rhythm at a rate of 91 no ST elevations or ST depressions no abnormal T    wave inversions incomplete right bundle with a left axis deviation otherwise  normal intervals  No previous ECG available for comparison  Electronically Signed On 2020 3:30:40 PDT by Sharla Castano MD     Comp Metabolic Panel    Collection Time: 20 11:24 PM   Result Value Ref Range    Sodium 141 135 - 145 mmol/L    Potassium 3.8 3.6 - 5.5 mmol/L    Chloride 104 96 - 112 mmol/L    Co2 25 20 - 33 mmol/L    Anion  Gap 12.0 7.0 - 16.0    Glucose 127 (H) 65 - 99 mg/dL    Bun 25 (H) 8 - 22 mg/dL    Creatinine 0.95 0.50 - 1.40 mg/dL    Calcium 7.9 (L) 8.5 - 10.5 mg/dL    AST(SGOT) 55 (H) 12 - 45 U/L    ALT(SGPT) 59 (H) 2 - 50 U/L    Alkaline Phosphatase 34 30 - 99 U/L    Total Bilirubin 0.3 0.1 - 1.5 mg/dL    Albumin 3.6 3.2 - 4.9 g/dL    Total Protein 5.4 (L) 6.0 - 8.2 g/dL    Globulin 1.8 (L) 1.9 - 3.5 g/dL    A-G Ratio 2.0 g/dL   TROPONIN    Collection Time: 20 11:24 PM   Result Value Ref Range    Troponin T 16 6 - 19 ng/L   ESTIMATED GFR    Collection Time: 20 11:24 PM   Result Value Ref Range    GFR If African American >60 >60 mL/min/1.73 m 2    GFR If Non African American >60 >60 mL/min/1.73 m 2   ABO Rh Confirm    Collection Time: 20 11:58 PM   Result Value Ref Range    ABO Rh Confirm A POS    EKG    Collection Time: 20 12:52 AM   Result Value Ref Range    Report       [facility] Emergency Dept.    Test Date:  2020  Pt Name:    CLARIBEL GREENE                Department: ER  MRN:        [id number]                      Room:       [facility] [place]  Gender:     Male                         Technician: [id number]  :        1960                   Requested By:SHARLA CASTANO  Order #:    [id number]                    Reading MD: Sharla Castano MD    Measurements  Intervals                                Axis  Rate:       91                           P:          58  OK:         196                          QRS:        -61  QRSD:       104                          T:          33  QT:         392  QTc:        483    Interpretive Statements  Sinus rhythm at a rate of 91 no ST elevations or ST depressions no abnormal T    wave inversions no pathognomonic Q waves borderline right bundle left axis  deviation  Compared to ECG 2020 23:15:57  No acute change  Electronically Signed On 2020 3:31:00 PDT by Sharla Castano MD     CBC with Differential    Collection Time: 05/15/20  3:31 AM    Result Value Ref Range    WBC 11.4 (H) 4.8 - 10.8 K/uL    RBC 4.54 (L) 4.70 - 6.10 M/uL    Hemoglobin 14.1 14.0 - 18.0 g/dL    Hematocrit 41.6 (L) 42.0 - 52.0 %    MCV 91.6 81.4 - 97.8 fL    MCH 31.1 27.0 - 33.0 pg    MCHC 33.9 33.7 - 35.3 g/dL    RDW 49.6 35.9 - 50.0 fL    Platelet Count 160 (L) 164 - 446 K/uL    MPV 10.4 9.0 - 12.9 fL    Neutrophils-Polys 82.90 (H) 44.00 - 72.00 %    Lymphocytes 8.10 (L) 22.00 - 41.00 %    Monocytes 5.80 0.00 - 13.40 %    Eosinophils 0.30 0.00 - 6.90 %    Basophils 0.40 0.00 - 1.80 %    Immature Granulocytes 2.50 (H) 0.00 - 0.90 %    Nucleated RBC 0.00 /100 WBC    Neutrophils (Absolute) 9.45 (H) 1.82 - 7.42 K/uL    Lymphs (Absolute) 0.92 (L) 1.00 - 4.80 K/uL    Monos (Absolute) 0.66 0.00 - 0.85 K/uL    Eos (Absolute) 0.03 0.00 - 0.51 K/uL    Baso (Absolute) 0.04 0.00 - 0.12 K/uL    Immature Granulocytes (abs) 0.28 (H) 0.00 - 0.11 K/uL    NRBC (Absolute) 0.00 K/uL   Comp Metabolic Panel    Collection Time: 05/15/20  3:31 AM   Result Value Ref Range    Sodium 133 (L) 135 - 145 mmol/L    Potassium 3.3 (L) 3.6 - 5.5 mmol/L    Chloride 95 (L) 96 - 112 mmol/L    Co2 24 20 - 33 mmol/L    Anion Gap 14.0 7.0 - 16.0    Glucose 188 (H) 65 - 99 mg/dL    Bun 22 8 - 22 mg/dL    Creatinine 0.91 0.50 - 1.40 mg/dL    Calcium 9.3 8.5 - 10.5 mg/dL    AST(SGOT) 18 12 - 45 U/L    ALT(SGPT) 55 (H) 2 - 50 U/L    Alkaline Phosphatase 37 30 - 99 U/L    Total Bilirubin 0.4 0.1 - 1.5 mg/dL    Albumin 4.0 3.2 - 4.9 g/dL    Total Protein 6.0 6.0 - 8.2 g/dL    Globulin 2.0 1.9 - 3.5 g/dL    A-G Ratio 2.0 g/dL   ESTIMATED GFR    Collection Time: 05/15/20  3:31 AM   Result Value Ref Range    GFR If African American >60 >60 mL/min/1.73 m 2    GFR If Non African American >60 >60 mL/min/1.73 m 2   Basic Metabolic Panel    Collection Time: 05/15/20 10:21 AM   Result Value Ref Range    Sodium 133 (L) 135 - 145 mmol/L    Potassium 3.7 3.6 - 5.5 mmol/L    Chloride 96 96 - 112 mmol/L    Co2 22 20 - 33 mmol/L     Glucose 183 (H) 65 - 99 mg/dL    Bun 21 8 - 22 mg/dL    Creatinine 0.80 0.50 - 1.40 mg/dL    Calcium 9.6 8.5 - 10.5 mg/dL    Anion Gap 15.0 7.0 - 16.0   MAGNESIUM    Collection Time: 05/15/20 10:21 AM   Result Value Ref Range    Magnesium 1.7 1.5 - 2.5 mg/dL   PHOSPHORUS    Collection Time: 05/15/20 10:21 AM   Result Value Ref Range    Phosphorus 2.9 2.5 - 4.5 mg/dL   TSH    Collection Time: 05/15/20 10:21 AM   Result Value Ref Range    TSH 1.160 0.380 - 5.330 uIU/mL   ESTIMATED GFR    Collection Time: 05/15/20 10:21 AM   Result Value Ref Range    GFR If African American >60 >60 mL/min/1.73 m 2    GFR If Non African American >60 >60 mL/min/1.73 m 2   AMMONIA    Collection Time: 05/15/20 12:03 PM   Result Value Ref Range    Ammonia 13 11 - 45 umol/L   VALPROIC ACID    Collection Time: 05/15/20 12:03 PM   Result Value Ref Range    Valproic Acid 43.7 (L) 50.0 - 100.0 ug/mL   Basic Metabolic Panel    Collection Time: 05/15/20  3:47 PM   Result Value Ref Range    Sodium 140 135 - 145 mmol/L    Potassium 3.7 3.6 - 5.5 mmol/L    Chloride 100 96 - 112 mmol/L    Co2 21 20 - 33 mmol/L    Glucose 146 (H) 65 - 99 mg/dL    Bun 19 8 - 22 mg/dL    Creatinine 0.78 0.50 - 1.40 mg/dL    Calcium 8.9 8.5 - 10.5 mg/dL    Anion Gap 19.0 (H) 7.0 - 16.0   ESTIMATED GFR    Collection Time: 05/15/20  3:47 PM   Result Value Ref Range    GFR If African American >60 >60 mL/min/1.73 m 2    GFR If Non African American >60 >60 mL/min/1.73 m 2       Imaging  DX-CHEST-PORTABLE (1 VIEW)   Final Result      No radiographic evidence of acute cardiopulmonary process.      CT-CTA NECK WITH & W/O-POST PROCESSING   Final Result      CT angiogram of the neck within normal limits.      CT-CTA HEAD WITH & W/O-POST PROCESS   Final Result      Left parietal mass resection with some blood vessels along the  operative bed margins. These could indicate healing response although local recurrence is also possible. Comparison with prior studies and correlation with  operative history may prove    helpful to clarify      No acute arterial occlusion is identified      CT-HEAD W/O   Final Result      No acute intracranial hemorrhage is identified.      Left parietal vertex craniotomy with underlying vasogenic edema and mass with associated mild rightward midline shift, moderate left lateral ventricular effacement      MR-BRAIN-WITH & W/O    (Results Pending)       Assessment/Plan  * Status epilepticus (HCC)- (present on admission)  Assessment & Plan  Has been rapidly escalating antiseizure meds since admission. Pt is now on 4 different meds  On continuous EEG  Neuro following    Plan:   Start propofol gtt post intubation  Fentanyl PRN  Continue keppra, valproate, topamax and vimpat  Continue cEEG  Will manage seizures per neuro guidance    Acute respiratory failure with hypoxia (HCC)  Assessment & Plan  Pt is obtunded, agitated  Don't think pt could protect his airway given mentation. Very high risk for aspiration  Pt was subsequently intubated, full vent support  Sedation with propofol and fentanyl  Goal RASS -1 if able  Keep goal >92%, PaO2 >60  Low tidal volume strategy    GBM (glioblastoma multiforme) (HCC)- (present on admission)  Assessment & Plan  S/p resection in 2/2020 at [facility]  Pt is undergoing chemo/radiation  On dexamethasone 4mg IV Q12H      Discussed patient condition and risk of morbidity and/or mortality with Hospitalist and RN. I had long discussion with daughter on the phone regarding pt's critical condition. All questions were answered.     I have assessed and reassessed his respiratory status, blood pressure, hemodynamics, cardiovascular and neurological status.  He is at increased risk for worsening respiratory, cardiovascular and neurological system dysfunction.   High risk of deterioration and worsening vital organ dysfunction and death without the above critical care interventions.    The patient remains critically ill.  Critical care time = 110 minutes in  directly providing and coordinating critical care and extensive data review.  No time overlap and excludes procedures.

## 2020-05-16 NOTE — DIETARY
"Nutrition Support Assessment: Ahmet Escobedo is a 60 y.o. male with admitting DX of seizure, GBM.      Current problem list:  1. Intubated/obtunded - acute respiratory failure with hypoxia  2. S/p GBM resection (Feb 2020), left side  3. Status epilepticus     Assessment:  Estimated Nutritional Needs based on:   Height: 167.6 cm (5' 5.98\")  Weight: 93.6 kg (206 lb 5.6 oz)  Weight to Use in Calculations: 93.6 kg (206 lb 5.6 oz)  Ideal Body Weight: 64.5 kg (142 lb 3.2 oz)  Body mass index is 33.32 kg/m².   Pertinent medical history: glioblastoma multiforme, HTN, sz    Calculation/Equation: MSJ x 1.0 = 1685 x (0.6 - 0.7) = 1011 - 1180; 11-14 kcals/kg = 1030 - 1310  Total Calories / day: 1450- 1650  (Calories / kg: 15 - 18)  Total Grams Protein / day: 97 - 129  (Grams Protein / kg IBW: 1.5 - 2.0)     Evaluation:   1. TF appropriate as evidenced by vent.    2. Pt has gastric cortrak with TF running with Fibersource at 25 mL/hr per TF protocol.    3. Considered obesity guidelines d/t BMI >30; however, pt most likely with increased needs r/t glio so do not want to underfeed too much.  Also, with propofol running at 22.5 mL/hr may need to go higher on kcals to get enough pro.  Fibersource will not meet est pro needs  4. Labs: glu 154, glyco 7.5%, WBC 12.8  5. Last BM: 5/13  6. Pertinent meds/fluids: decadron, bowel protocol, propofol at 22.5 mL/hr (594 kcals/d)      Malnutrition Risk: appears low at this time as evidenced by negative nutrition admit screen and pt was 91 kg in Februray     Recommendations/Plan:  1. Change TF to Peptamen Intense VHP at 25 mL/hr and increase per TF protocol to goal rate of 45 mL/hr with propofol.  This will provide 1080 kcals (1674 with propofol), 99 gm pro and 907 mL free water per day.  If propofol d/c'd, goal rate with Peptamen Intense VHP is 60 mL/hr to provide 1440 kcals, 132 gm pro and 1210 mL free water per day.    2. RD will monitor nutrition parameters.                "

## 2020-05-16 NOTE — PROGRESS NOTES
Pt transported from S165 to R116 via ACLS RN and CCT, on monitor. Dr. Erickson at bedside on arrival. Preparation for intubation. All belongings with patient.

## 2020-05-16 NOTE — CARE PLAN
Ventilator Daily Summary    Vent Day #2    Ventilator settings changed this shift:no    Weaning trials:no    Respiratory Procedures:no      Plan: Continue current ventilator settings and wean mechanical ventilation as tolerated per physician orders.

## 2020-05-16 NOTE — PROGRESS NOTES
Cortrak Placement    Tube Team verified patient name and medical record number prior to tube placement.  Cortrak tube (43 inches, 10 [place]) placed at 70 cm in right nare.  Per Cortrak picture, tube appears to be in the stomach.  Nursing Instructions: Awaiting KUB to confirm placement before use for medications or feeding. Once placement confirmed, flush tube with 30 ml of water, and then remove and save stylet, in patient medication drawer.

## 2020-05-16 NOTE — PROGRESS NOTES
I had a long conversation with the patient's daughter and significant other.  Went over half in the past 24 hours.  Explained to the patient became very sedated after 4 mg Ativan.  Explained the need for intubation.  Also explained that he is not had any seizures on the EEG overnight while on propofol.  Explained the plan today's  is to get an MRI brain to see about the swelling and also if there is any recurrence of the tumor.  Explained that next step will be determined on the MRI findings.  Explained if the MRI was unremarkable we will try to wean sedation tomorrow.  I answered all other questions at this time.      Jass Velazquez MD

## 2020-05-16 NOTE — CARE PLAN
Problem: Communication  Goal: The ability to communicate needs accurately and effectively will improve  Outcome: PROGRESSING SLOWER THAN EXPECTED     Problem: Safety  Goal: Will remain free from injury  Outcome: PROGRESSING SLOWER THAN EXPECTED  Goal: Will remain free from falls  Outcome: PROGRESSING SLOWER THAN EXPECTED     Problem: Infection  Goal: Will remain free from infection  Outcome: PROGRESSING SLOWER THAN EXPECTED     Problem: Venous Thromboembolism (VTW)/Deep Vein Thrombosis (DVT) Prevention:  Goal: Patient will participate in Venous Thrombosis (VTE)/Deep Vein Thrombosis (DVT)Prevention Measures  Outcome: PROGRESSING SLOWER THAN EXPECTED     Problem: Bowel/Gastric:  Goal: Normal bowel function is maintained or improved  Outcome: PROGRESSING SLOWER THAN EXPECTED  Goal: Will not experience complications related to bowel motility  Outcome: PROGRESSING SLOWER THAN EXPECTED     Problem: Knowledge Deficit  Goal: Knowledge of disease process/condition, treatment plan, diagnostic tests, and medications will improve  Outcome: PROGRESSING SLOWER THAN EXPECTED  Goal: Knowledge of the prescribed therapeutic regimen will improve  Outcome: PROGRESSING SLOWER THAN EXPECTED     Problem: Discharge Barriers/Planning  Goal: Patient's continuum of care needs will be met  Outcome: PROGRESSING SLOWER THAN EXPECTED     Problem: Fluid Volume:  Goal: Will maintain balanced intake and output  Outcome: PROGRESSING SLOWER THAN EXPECTED     Problem: Skin Integrity  Goal: Risk for impaired skin integrity will decrease  Outcome: PROGRESSING SLOWER THAN EXPECTED     Problem: Safety - Medical Restraint  Goal: Remains free of injury from restraints (Restraint for Interference with Medical Device)  Description: INTERVENTIONS:  1. Determine that other, less restrictive measures have been tried or would not be effective before applying the restraint  2. Evaluate the patient's condition at the time of restraint application  3. Inform  patient/family regarding the reason for restraint  4. Q2H: Monitor safety, psychosocial status, comfort, nutrition and hydration  Outcome: PROGRESSING SLOWER THAN EXPECTED  Goal: Free from restraint(s) (Restraint for Interference with Medical Device)  Description: INTERVENTIONS:  1. ONCE/SHIFT or MINIMUM Q12H: Assess and document the continuing need for restraints  2. Q24H: Continued use of restraint requires LIP to perform face to face examination and written order  3. Identify and implement measures to help patient regain control  Outcome: PROGRESSING SLOWER THAN EXPECTED

## 2020-05-16 NOTE — PROCEDURES
BRONCHOSCOPY PROCEDURE NOTE      Date: 5/15/2020  Time: 7:37 PM    Time out: performed. Name, MRN, allergy and procedure were confirmed.     Indication: acute respiratory failure, ?aspiration    Consent: Informed consent obtained from daughter after explaining the benefits/risks of the procedure including but not limited to bleeding, infection, airway trauma or loss thereof, pneumothorax/hemothorax, arrythmia, or death. Patient or surrogate expressed understanding and agreement and signed consent which can be found in the patient's chart.     Procedure: Bronchoscopy with bronchoalveolar lavage and therapeutic aspiration of secretions    Sedation: propofol, fentanyl,    Findings:  Respiratory therapy and nursing at bedside throughout procedure. Patient provided sedation and analgesia throughout the procedure. Placed on full ventilator support with an FiO2 of 100% during procedure. Using a fiberoptic bronchoscope, trachea entered via ET tube.   Airways visualized directly and careful inspection of airway was accomplished.     BAL obtained in [id number] of cloudy/thin pinkish BAL fluid was obtained and sent for micro    Upper airway - Not visualized as bronchoscope passed through ETT.  Trachea to jake - normal appearing mucosa without lesions or mass, ETT tip measured 3cm from the jake.  Right main bronchus, segmental and subsegmental airways: normal appearing mucosa without mass/lesion/anatomic variance, secretions: minimal in the right middle lobe  Left main bronchus, segmental and subsegmental airways: normal appearing mucosa without mass/lesion/anatomic variance, secretions:none  All secretions were suctioned and cleared.     Specimen sent to microbiology/pathology: routine gs/cx, fungal/AFB smear/culture,     Complications:  Patient tolerated procedure well without any difficulties and left in care of bedside nurse/RT.     Estimated blood loss: none    For the above procedure I also performed the conscious  sedation and was directly involved with administration of medication, monitoring airway, vital signs, preventing complications.  Administered total 90mg of propofol and 150mcg fentanyl in titration fashion until achieving a level of moderate procedural sedation.      Patient tolerated procedure well without complications from sedation and was left in the care of his bedside nurse and respiratory therapy. Procedural sedation time equals 15 minutes which was separate from procedure time as described above.  Start time: 1905  Stop time: 1920      Nikhil Erickson D.O.  Critical Care Medicine

## 2020-05-16 NOTE — CARE PLAN
Problem: Knowledge Deficit  Goal: Knowledge of disease process/condition, treatment plan, diagnostic tests, and medications will improve  Outcome: PROGRESSING AS EXPECTED  Note: Updated patient's family on patient's disease process, condition, treatment plan, tests and medications.  Received verbal understanding.     Problem: Communication  Goal: The ability to communicate needs accurately and effectively will improve  Outcome: PROGRESSING SLOWER THAN EXPECTED  Note: Patient unable to communicate with staff today.  Spoke with family, daughter (Linh) and girlfriend (Jerrica Salazar) several times today over the telephone to update them on plan and status.  Patient transferred to R116 after seizure episode.  Called Linh and updated her on patient's transfer.

## 2020-05-16 NOTE — ASSESSMENT & PLAN NOTE
S/P resection at [facility] in February 2020  S/P chemoradiation  Taper Decadron, 2 mg daily

## 2020-05-16 NOTE — PROGRESS NOTES
Patient was transferred to the ICU at 1806 today.  Nurse did not call or notify the EEG department.  Dr. Velazquez discovered that the EEG machine was not in the patient's ICU room, and thus Tuan Texted this on-call technologist to notify.  This technologist came in to transfer the EEG machine to the ICU room.

## 2020-05-16 NOTE — ASSESSMENT & PLAN NOTE
Status epilepticus resolved  Continue seizure precautions  Continue Vimpat, 300 mg twice daily  Continue Keppra, 1500 mg twice daily  Continue Topamax, 200 mg twice daily  Continue valproic acid, 750 mg twice daily

## 2020-05-16 NOTE — PROGRESS NOTES
Monitor Summary: SR-ST ; rare to occasional PVCs; occasional to frequent PACs; 0.20/0.10/0.40    12 hour chart check.

## 2020-05-16 NOTE — PROCEDURES
Intubation    Date/Time: 5/15/2020 7:33 PM  Performed by: Nikhil Erickson D.O.  Authorized by: Nikhil Erickson D.O.     Consent:     Consent obtained:  Verbal    Consent given by: Daughter on the phone.    Risks discussed:  Aspiration, brain injury, death, hypoxia, dental trauma, laryngeal injury, pneumothorax and bleeding    Alternatives discussed:  Delayed treatment  Universal protocol:     Site/side marked: yes      Immediately prior to procedure, a time out was called: yes      Patient identity confirmed:  Arm band  Pre-procedure details:     Patient status:  Unresponsive    Mallampati score:  IV    Pretreatment medications:  Fentanyl    Paralytics:  Succinylcholine  Procedure details:     Preoxygenation:  Bag valve mask    CPR in progress: no      Intubation method:  Oral    Oral intubation technique:  Video-assisted    Laryngoscope type:  GlideScope    Laryngoscope blade:  Mac 4    Cormack-Lehane Classification:  Grade 2a    Tube size (mm):  8.0    Tube type:  Cuffed    Number of attempts:  1    Ventilation between attempts: yes      Cricoid pressure: yes      Tube visualized through cords: yes    Placement assessment:     ETT to teeth:  25 cm    Tube secured with:  ETT meek and adhesive tape    Breath sounds:  Equal    Placement verification: chest rise, ETCO2 detector and fiberoptic scope      CXR findings:  ETT in proper place  Post-procedure details:     Patient tolerance of procedure:  Tolerated well, no immediate complications

## 2020-05-16 NOTE — PROGRESS NOTES
Critical Care Progress Note    Date of admission  5/13/2020    Chief Complaint  61yo male with hx of GBM s/p resection in 2/2020, hx of seizures now admitted for recurrent seizures. Pt is on keppra 750mg PO BID and low dose dexamethasone 0.25mg PO daily at home. He presented after having right sided convulsive movements and slurred speech.      Pt was started on topamax, and valproate, and vimpat since admission. Keppra dose was increased to 1500 Q12H. On dexamethasone 4 Q12H. Given ativan of total 6mg since admission. Pt is somnolent and given the quick escalation of his antiseizure meds to control his seizures, pt's transferred to ICU for further management.  Upon ICU arrival pt was obtunded       Hospital Course          Interval Problem Update  Reviewed last 24 hour events:  Keep prop at 40 per neuro  eeg reduced seizures  On aed  sbp 120-130s  Core trak  Central line  Hypertonic goal 140-150  dvt px held  pepcid  MRI pending    Addendum,  Discussed with family over the phone  Central line for hypertonic saline for goal 140-150 given likely edema    Review of Systems  Review of Systems   Unable to perform ROS: Intubated        Vital Signs for last 24 hours   Temp:  [35.7 °C (96.2 °F)-36.8 °C (98.2 °F)] 35.9 °C (96.7 °F)  Pulse:  [63-97] 66  Resp:  [16-45] 24  BP: ()/() 106/67  SpO2:  [91 %-100 %] 100 %    Hemodynamic parameters for last 24 hours       Respiratory Information for the last 24 hours  Vent Mode: APVCMV  Rate (breaths/min): 20  Vt Target (mL): 440  PEEP/CPAP: 8  MAP: 11    Physical Exam   Physical Exam  Vitals signs and nursing note reviewed.   Constitutional:       General: He is not in acute distress.     Appearance: He is ill-appearing. He is not toxic-appearing or diaphoretic.   HENT:      Head: Normocephalic and atraumatic.      Right Ear: External ear normal.      Left Ear: External ear normal.      Nose: No congestion or rhinorrhea.      Mouth/Throat:      Mouth: Mucous membranes  are moist.      Pharynx: Oropharynx is clear. No oropharyngeal exudate or posterior oropharyngeal erythema.   Eyes:      General: No scleral icterus.        Right eye: No discharge.         Left eye: No discharge.      Conjunctiva/sclera: Conjunctivae normal.      Pupils: Pupils are equal, round, and reactive to light.   Neck:      Musculoskeletal: Normal range of motion. No neck rigidity or muscular tenderness.   Cardiovascular:      Rate and Rhythm: Normal rate and regular rhythm.      Pulses: Normal pulses.      Heart sounds: Normal heart sounds. No murmur.   Pulmonary:      Effort: Respiratory distress present.      Breath sounds: No stridor. No wheezing, rhonchi or rales.      Comments: On ventilator  Chest:      Chest wall: No tenderness.   Abdominal:      General: There is no distension.      Palpations: There is no mass.      Tenderness: There is no abdominal tenderness. There is no guarding.   Musculoskeletal:         General: No swelling, tenderness or deformity.      Right lower leg: No edema.      Left lower leg: No edema.   Lymphadenopathy:      Cervical: No cervical adenopathy.   Skin:     Coloration: Skin is not jaundiced or pale.      Findings: No bruising, erythema, lesion or rash.   Neurological:      Mental Status: He is alert.      Cranial Nerves: No cranial nerve deficit.      Sensory: No sensory deficit.      Motor: No weakness.      Coordination: Coordination normal.      Gait: Gait normal.      Comments: Non responsive, sedated high dose propofol for seizures         Medications  Current Facility-Administered Medications   Medication Dose Route Frequency Provider Last Rate Last Dose   • lacosamide (VIMPAT) 300 mg in  mL ivpb  300 mg Intravenous Q12HRS Jass Velazquez M.D.   Stopped at 05/16/20 0122   • LORazepam (ATIVAN) injection 1-2 mg  1-2 mg Intravenous Q4HRS PRN Igor Carlos M.D.   2 mg at 05/15/20 0941   • 3% sodium chloride (HYPERTONIC SALINE) 500mL infusion   Intravenous  Continuous Guido Pierre M.D. 30 mL/hr at 05/15/20 2216     • dexamethasone (DECADRON) injection 4 mg  4 mg Intravenous Q12HRS Guido Pierre M.D.   4 mg at 05/16/20 0500   • Respiratory Therapy Consult   Nebulization Continuous RT Nikhil Erickson D.O.       • ipratropium-albuterol (DUONEB) nebulizer solution  3 mL Nebulization Q2HRS PRN (RT) Nikhil Erickson D.O.       • famotidine (PEPCID) tablet 20 mg  20 mg Enteral Tube Q12HRS Nikhil Erickson D.O.        Or   • famotidine (PEPCID) injection 20 mg  20 mg Intravenous Q12HRS NICK ReddyOStephie   20 mg at 05/16/20 0500   • senna-docusate (PERICOLACE or SENOKOT S) 8.6-50 MG per tablet 2 Tab  2 Tab Enteral Tube BID Nikhil Erickson D.O.   Stopped at 05/15/20 1900    And   • polyethylene glycol/lytes (MIRALAX) PACKET 1 Packet  1 Packet Enteral Tube QDAY PRN Nikhil Erickson D.O.        And   • magnesium hydroxide (MILK OF MAGNESIA) suspension 30 mL  30 mL Enteral Tube QDAY PRN Nikhil Erickson D.O.        And   • bisacodyl (DULCOLAX) suppository 10 mg  10 mg Rectal QDAY PRN Nikhil Erickson D.O.       • MD Alert...ICU Electrolyte Replacement per Pharmacy   Other PHARMACY TO DOSE Nikhil Erickson D.O.       • lidocaine (XYLOCAINE) 1 % injection 1-2 mL  1-2 mL Tracheal Tube Q30 MIN PRN Nikhil Erickson D.O.       • fentaNYL (SUBLIMAZE) 50 mcg/mL in 50mL (Continuous Infusion)  0-300 mcg/hr Intravenous Continuous Nikhil Erickson D.O.   Stopped at 05/15/20 1930   • FENTANYL CITRATE (PF) 0.05 MG/ML INJ SOLN        Stopped at 05/15/20 1915   • propofol (DIPRIVAN) injection  0-80 mcg/kg/min Intravenous Continuous Nikhil Erickson D.O. 22.5 mL/hr at 05/16/20 0646 40 mcg/kg/min at 05/16/20 0646   • lidocaine (XYLOCAINE) 1 % injection 0.5 mL  0.5 mL Intradermal Once PRN Nikhil Erickson D.O.       • losartan (COZAAR) tablet 100 mg  100 mg Oral DAILY Igor Carlos M.D.   Stopped at 05/15/20 1800   • acetaminophen (TYLENOL) tablet 650 mg  650 mg Oral Q6HRS PRN Igor Carlos M.D.       • Pharmacy Consult Request ...Pain  Management Review 1 Each  1 Each Other PHARMACY TO DOSE Igor Carlos M.D.        And   • oxyCODONE immediate-release (ROXICODONE) tablet 2.5 mg  2.5 mg Oral Q3HRS PRN Igor Carlos M.D.        And   • oxyCODONE immediate-release (ROXICODONE) tablet 5 mg  5 mg Oral Q3HRS PRN Igor Carlos M.D.        And   • morphine (pf) 4 MG/ML injection 2 mg  2 mg Intravenous Q3HRS PRN Igor Carlos M.D.       • ondansetron (ZOFRAN) syringe/vial injection 4 mg  4 mg Intravenous Q4HRS PRN Igor Carlos M.D.       • ondansetron (ZOFRAN ODT) dispertab 4 mg  4 mg Oral Q4HRS PRN Igor Carlos M.D.       • promethazine (PHENERGAN) tablet 12.5-25 mg  12.5-25 mg Oral Q4HRS PRN Igor Carlos M.D.       • promethazine (PHENERGAN) suppository 12.5-25 mg  12.5-25 mg Rectal Q4HRS PRN Igor Carlos M.D.       • prochlorperazine (COMPAZINE) injection 5-10 mg  5-10 mg Intravenous Q4HRS PRN Igor Carlos M.D.       • LORazepam (ATIVAN) injection 4 mg  4 mg Intravenous Q10 MIN PRN Igor Carlos M.D.   4 mg at 05/15/20 1545   • levETIRAcetam (KEPPRA) 1,500 mg in  mL IVPB  1,500 mg Intravenous Q12HRS Igor Carlos M.D.   Stopped at 05/16/20 0515   • hydrALAZINE (APRESOLINE) injection 10 mg  10 mg Intravenous Q6HRS PRN Guido Pierre M.D.       • topiramate (TOPAMAX) tablet 200 mg  200 mg Oral Q12HRS Jass Velazquez M.D.   Stopped at 05/15/20 1800   • valproate (DEPACON) 750 mg in  mL IVPB  750 mg Intravenous BID Jass Velazquez M.D.   Stopped at 05/15/20 2311       Fluids    Intake/Output Summary (Last 24 hours) at 5/16/2020 0650  Last data filed at 5/16/2020 0500  Gross per 24 hour   Intake --   Output 2600 ml   Net -2600 ml       Laboratory  Recent Labs     05/15/20  2100 05/16/20  0400   ISTATAPH 7.353* 7.393*   ISTATAPCO2 43.0* 33.6   ISTATAPO2 313* 136*   ISTATATCO2 25 21   IERFTUG4XFA 100* 99   ISTATARTHCO3 23.9 20.5   ISTATARTBE -2 -4   ISTATTEMP 96.9 F 96.9 F   ISTATFIO2 100 40   ISTATSPEC Arterial  Arterial   ISTATAPHTC 7.366* 7.407   [id number] 308* 130*         Recent Labs     05/15/20  1021 05/15/20  1547 05/16/20  0353   SODIUM 133* 140 137   POTASSIUM 3.7 3.7 3.7   CHLORIDE 96 100 104   CO2 22 21 18*   BUN 21 19 20   CREATININE 0.80 0.78 0.62   MAGNESIUM 1.7  --  2.0   PHOSPHORUS 2.9  --  3.8   CALCIUM 9.6 8.9 9.0     Recent Labs     05/13/20  2324 05/15/20  0331 05/15/20  1021 05/15/20  1547 05/16/20  0353   ALTSGPT 59* 55*  --   --   --    ASTSGOT 55* 18  --   --   --    ALKPHOSPHAT 34 37  --   --   --    TBILIRUBIN 0.3 0.4  --   --   --    GLUCOSE 127* 188* 183* 146* 154*     Recent Labs     05/13/20  2236 05/13/20  2324 05/15/20  0331 05/16/20  0353   WBC 6.1  --  11.4* 12.8*   NEUTSPOLYS 74.80*  --  82.90* 87.40*   LYMPHOCYTES 15.60*  --  8.10* 5.60*   MONOCYTES 6.10  --  5.80 5.20   EOSINOPHILS 0.00  --  0.30 0.00   BASOPHILS 0.90  --  0.40 0.20   ASTSGOT  --  55* 18  --    ALTSGPT  --  59* 55*  --    ALKPHOSPHAT  --  34 37  --    TBILIRUBIN  --  0.3 0.4  --      Recent Labs     05/13/20  2236 05/15/20  0331 05/16/20  0353   RBC 4.37* 4.54* 4.52*   HEMOGLOBIN 14.0 14.1 14.1   HEMATOCRIT 40.3* 41.6* 42.9   PLATELETCT 155* 160* 183   PROTHROMBTM 12.0  --   --    APTT 26.0  --   --    INR 0.87  --   --        Imaging  X-Ray:  I have personally reviewed the images and compared with prior images.  EKG:  I have personally reviewed the images and compared with prior images.  CT:    Reviewed  MRI:   Reviewed    Assessment/Plan  * Status epilepticus (HCC)- (present on admission)  Assessment & Plan  Has been rapidly escalating antiseizure meds since admission. Pt is now on 4 different meds  On continuous EEG  Neuro following    Plan:   Start propofol gtt post intubation  Fentanyl PRN  Continue keppra, valproate, topamax and vimpat  Continue cEEG  Will manage seizures per neuro guidance    Acute respiratory failure with hypoxia (HCC)  Assessment & Plan  Pt is obtunded, agitated  Don't think pt could protect  his airway given mentation. Very high risk for aspiration  Pt was subsequently intubated, full vent support  Sedation with propofol and fentanyl  Goal RASS -1 if able  Keep goal >92%, PaO2 >60  Low tidal volume strategy    GBM (glioblastoma multiforme) (HCC)- (present on admission)  Assessment & Plan  S/p resection in 2/2020 at [facility]  Pt is undergoing chemo/radiation  On dexamethasone 4mg IV Q12H       VTE:  Contraindicated  Ulcer: H2 Antagonist  Lines: Central Line  Ongoing indication addressed and Novak Catheter  Ongoing indication addressed    I have performed a physical exam and reviewed and updated ROS and Plan today (5/16/2020). In review of yesterday's note (5/15/2020), there are no changes except as documented above.     Discussed patient condition and risk of morbidity and/or mortality with Family, RN, RT, Pharmacy, Code status disscussed, Charge nurse / hot rounds and neurology     The patient remains critically ill.  He is on a mechanical ventilator.  Propofol drip high dosing for anti seizure properties and sedation.  Status epilepticus with continuous eeg.  Hypertonic saline for sodium goal 140-150, adjusted per sequential sodium labs.  Critical care time = 85 minutes in directly providing and coordinating critical care and extensive data review.  No time overlap and excludes procedures.

## 2020-05-17 ENCOUNTER — APPOINTMENT (OUTPATIENT)
Dept: RADIOLOGY | Facility: MEDICAL CENTER | Age: 60
DRG: 100 | End: 2020-05-17
Attending: INTERNAL MEDICINE
Payer: OTHER MISCELLANEOUS

## 2020-05-17 LAB
ACTION RANGE TRIGGERED IACRT: NO
ANION GAP SERPL CALC-SCNC: 12 MMOL/L (ref 7–16)
ANION GAP SERPL CALC-SCNC: 13 MMOL/L (ref 7–16)
ANION GAP SERPL CALC-SCNC: 14 MMOL/L (ref 7–16)
ANION GAP SERPL CALC-SCNC: 9 MMOL/L (ref 7–16)
BACTERIA SPEC RESP CULT: ABNORMAL
BASE EXCESS BLDA CALC-SCNC: -3 MMOL/L (ref -4–3)
BASOPHILS # BLD AUTO: 0.1 % (ref 0–1.8)
BASOPHILS # BLD: 0.01 K/UL (ref 0–0.12)
BODY TEMPERATURE: ABNORMAL DEGREES
BUN SERPL-MCNC: 22 MG/DL (ref 8–22)
BUN SERPL-MCNC: 25 MG/DL (ref 8–22)
BUN SERPL-MCNC: 26 MG/DL (ref 8–22)
BUN SERPL-MCNC: 27 MG/DL (ref 8–22)
CALCIUM SERPL-MCNC: 8.1 MG/DL (ref 8.5–10.5)
CALCIUM SERPL-MCNC: 8.4 MG/DL (ref 8.5–10.5)
CALCIUM SERPL-MCNC: 8.5 MG/DL (ref 8.5–10.5)
CALCIUM SERPL-MCNC: 8.6 MG/DL (ref 8.5–10.5)
CHLORIDE SERPL-SCNC: 109 MMOL/L (ref 96–112)
CHLORIDE SERPL-SCNC: 109 MMOL/L (ref 96–112)
CHLORIDE SERPL-SCNC: 111 MMOL/L (ref 96–112)
CHLORIDE SERPL-SCNC: 112 MMOL/L (ref 96–112)
CO2 BLDA-SCNC: 21 MMOL/L (ref 20–33)
CO2 SERPL-SCNC: 21 MMOL/L (ref 20–33)
CO2 SERPL-SCNC: 21 MMOL/L (ref 20–33)
CO2 SERPL-SCNC: 22 MMOL/L (ref 20–33)
CO2 SERPL-SCNC: 23 MMOL/L (ref 20–33)
CREAT SERPL-MCNC: 0.76 MG/DL (ref 0.5–1.4)
CREAT SERPL-MCNC: 0.78 MG/DL (ref 0.5–1.4)
CREAT SERPL-MCNC: 0.85 MG/DL (ref 0.5–1.4)
CREAT SERPL-MCNC: 0.89 MG/DL (ref 0.5–1.4)
CRP SERPL HS-MCNC: 0.65 MG/DL (ref 0–0.75)
EOSINOPHIL # BLD AUTO: 0 K/UL (ref 0–0.51)
EOSINOPHIL NFR BLD: 0 % (ref 0–6.9)
ERYTHROCYTE [DISTWIDTH] IN BLOOD BY AUTOMATED COUNT: 51.9 FL (ref 35.9–50)
GLUCOSE SERPL-MCNC: 144 MG/DL (ref 65–99)
GLUCOSE SERPL-MCNC: 147 MG/DL (ref 65–99)
GLUCOSE SERPL-MCNC: 193 MG/DL (ref 65–99)
GLUCOSE SERPL-MCNC: 205 MG/DL (ref 65–99)
GRAM STN SPEC: ABNORMAL
GRAM STN SPEC: NORMAL
HCO3 BLDA-SCNC: 20.2 MMOL/L (ref 17–25)
HCT VFR BLD AUTO: 38.5 % (ref 42–52)
HGB BLD-MCNC: 12.7 G/DL (ref 14–18)
HOROWITZ INDEX BLDA+IHG-RTO: 293 MM[HG]
IMM GRANULOCYTES # BLD AUTO: 0.22 K/UL (ref 0–0.11)
IMM GRANULOCYTES NFR BLD AUTO: 2.5 % (ref 0–0.9)
INST. QUALIFIED PATIENT IIQPT: YES
LYMPHOCYTES # BLD AUTO: 0.7 K/UL (ref 1–4.8)
LYMPHOCYTES NFR BLD: 7.8 % (ref 22–41)
MAGNESIUM SERPL-MCNC: 1.8 MG/DL (ref 1.5–2.5)
MCH RBC QN AUTO: 31.6 PG (ref 27–33)
MCHC RBC AUTO-ENTMCNC: 33 G/DL (ref 33.7–35.3)
MCV RBC AUTO: 95.8 FL (ref 81.4–97.8)
MONOCYTES # BLD AUTO: 0.61 K/UL (ref 0–0.85)
MONOCYTES NFR BLD AUTO: 6.8 % (ref 0–13.4)
NEUTROPHILS # BLD AUTO: 7.43 K/UL (ref 1.82–7.42)
NEUTROPHILS NFR BLD: 82.8 % (ref 44–72)
NRBC # BLD AUTO: 0 K/UL
NRBC BLD-RTO: 0 /100 WBC
O2/TOTAL GAS SETTING VFR VENT: 30 %
PCO2 BLDA: 30.7 MMHG (ref 26–37)
PCO2 TEMP ADJ BLDA: 30.2 MMHG (ref 26–37)
PH BLDA: 7.43 [PH] (ref 7.4–7.5)
PH TEMP ADJ BLDA: 7.43 [PH] (ref 7.4–7.5)
PHOSPHATE SERPL-MCNC: 3.6 MG/DL (ref 2.5–4.5)
PLATELET # BLD AUTO: 164 K/UL (ref 164–446)
PMV BLD AUTO: 10.2 FL (ref 9–12.9)
PO2 BLDA: 88 MMHG (ref 64–87)
PO2 TEMP ADJ BLDA: 86 MMHG (ref 64–87)
POTASSIUM SERPL-SCNC: 3.1 MMOL/L (ref 3.6–5.5)
POTASSIUM SERPL-SCNC: 3.3 MMOL/L (ref 3.6–5.5)
POTASSIUM SERPL-SCNC: 3.4 MMOL/L (ref 3.6–5.5)
POTASSIUM SERPL-SCNC: 3.4 MMOL/L (ref 3.6–5.5)
PREALB SERPL-MCNC: 25.6 MG/DL (ref 18–38)
RBC # BLD AUTO: 4.02 M/UL (ref 4.7–6.1)
SAO2 % BLDA: 97 % (ref 93–99)
SIGNIFICANT IND 70042: ABNORMAL
SIGNIFICANT IND 70042: NORMAL
SITE SITE: ABNORMAL
SITE SITE: NORMAL
SODIUM SERPL-SCNC: 140 MMOL/L (ref 135–145)
SODIUM SERPL-SCNC: 145 MMOL/L (ref 135–145)
SODIUM SERPL-SCNC: 145 MMOL/L (ref 135–145)
SODIUM SERPL-SCNC: 146 MMOL/L (ref 135–145)
SOURCE SOURCE: ABNORMAL
SOURCE SOURCE: NORMAL
SPECIMEN DRAWN FROM PATIENT: ABNORMAL
TRIGL SERPL-MCNC: 554 MG/DL (ref 0–149)
WBC # BLD AUTO: 9 K/UL (ref 4.8–10.8)

## 2020-05-17 PROCEDURE — 99291 CRITICAL CARE FIRST HOUR: CPT | Mod: 25 | Performed by: INTERNAL MEDICINE

## 2020-05-17 PROCEDURE — 700117 HCHG RX CONTRAST REV CODE 255: Performed by: HOSPITALIST

## 2020-05-17 PROCEDURE — 87205 SMEAR GRAM STAIN: CPT

## 2020-05-17 PROCEDURE — 700111 HCHG RX REV CODE 636 W/ 250 OVERRIDE (IP): Performed by: FAMILY MEDICINE

## 2020-05-17 PROCEDURE — 4A10X4Z MONITORING OF CENTRAL NERVOUS ELECTRICAL ACTIVITY, EXTERNAL APPROACH: ICD-10-PCS | Performed by: PSYCHIATRY & NEUROLOGY

## 2020-05-17 PROCEDURE — 700111 HCHG RX REV CODE 636 W/ 250 OVERRIDE (IP): Performed by: HOSPITALIST

## 2020-05-17 PROCEDURE — 700102 HCHG RX REV CODE 250 W/ 637 OVERRIDE(OP): Performed by: INTERNAL MEDICINE

## 2020-05-17 PROCEDURE — 83735 ASSAY OF MAGNESIUM: CPT

## 2020-05-17 PROCEDURE — 36600 WITHDRAWAL OF ARTERIAL BLOOD: CPT

## 2020-05-17 PROCEDURE — 700105 HCHG RX REV CODE 258: Performed by: PSYCHIATRY & NEUROLOGY

## 2020-05-17 PROCEDURE — 31645 BRNCHSC W/THER ASPIR 1ST: CPT | Performed by: INTERNAL MEDICINE

## 2020-05-17 PROCEDURE — 85025 COMPLETE CBC W/AUTO DIFF WBC: CPT

## 2020-05-17 PROCEDURE — 70553 MRI BRAIN STEM W/O & W/DYE: CPT

## 2020-05-17 PROCEDURE — 302978 HCHG BRONCHOSCOPY-DIAGNOSTIC

## 2020-05-17 PROCEDURE — 700101 HCHG RX REV CODE 250: Performed by: INTERNAL MEDICINE

## 2020-05-17 PROCEDURE — 93975 VASCULAR STUDY: CPT

## 2020-05-17 PROCEDURE — 84134 ASSAY OF PREALBUMIN: CPT

## 2020-05-17 PROCEDURE — 700111 HCHG RX REV CODE 636 W/ 250 OVERRIDE (IP): Performed by: INTERNAL MEDICINE

## 2020-05-17 PROCEDURE — 84478 ASSAY OF TRIGLYCERIDES: CPT

## 2020-05-17 PROCEDURE — 95714 VEEG EA 12-26 HR UNMNTR: CPT | Performed by: PSYCHIATRY & NEUROLOGY

## 2020-05-17 PROCEDURE — C9254 INJECTION, LACOSAMIDE: HCPCS | Performed by: PSYCHIATRY & NEUROLOGY

## 2020-05-17 PROCEDURE — 770022 HCHG ROOM/CARE - ICU (200)

## 2020-05-17 PROCEDURE — A9270 NON-COVERED ITEM OR SERVICE: HCPCS | Performed by: INTERNAL MEDICINE

## 2020-05-17 PROCEDURE — 700105 HCHG RX REV CODE 258: Performed by: HOSPITALIST

## 2020-05-17 PROCEDURE — 99233 SBSQ HOSP IP/OBS HIGH 50: CPT | Mod: 25 | Performed by: PSYCHIATRY & NEUROLOGY

## 2020-05-17 PROCEDURE — 84100 ASSAY OF PHOSPHORUS: CPT

## 2020-05-17 PROCEDURE — 80048 BASIC METABOLIC PNL TOTAL CA: CPT | Mod: 91

## 2020-05-17 PROCEDURE — 95720 EEG PHY/QHP EA INCR W/VEEG: CPT | Performed by: PSYCHIATRY & NEUROLOGY

## 2020-05-17 PROCEDURE — 0B9C8ZZ DRAINAGE OF RIGHT UPPER LUNG LOBE, VIA NATURAL OR ARTIFICIAL OPENING ENDOSCOPIC: ICD-10-PCS | Performed by: INTERNAL MEDICINE

## 2020-05-17 PROCEDURE — 700105 HCHG RX REV CODE 258: Performed by: INTERNAL MEDICINE

## 2020-05-17 PROCEDURE — 0B9G8ZZ DRAINAGE OF LEFT UPPER LUNG LOBE, VIA NATURAL OR ARTIFICIAL OPENING ENDOSCOPIC: ICD-10-PCS | Performed by: INTERNAL MEDICINE

## 2020-05-17 PROCEDURE — 0B9D8ZZ DRAINAGE OF RIGHT MIDDLE LUNG LOBE, VIA NATURAL OR ARTIFICIAL OPENING ENDOSCOPIC: ICD-10-PCS | Performed by: INTERNAL MEDICINE

## 2020-05-17 PROCEDURE — 86140 C-REACTIVE PROTEIN: CPT

## 2020-05-17 PROCEDURE — 0B9J8ZZ DRAINAGE OF LEFT LOWER LUNG LOBE, VIA NATURAL OR ARTIFICIAL OPENING ENDOSCOPIC: ICD-10-PCS | Performed by: INTERNAL MEDICINE

## 2020-05-17 PROCEDURE — 94003 VENT MGMT INPAT SUBQ DAY: CPT

## 2020-05-17 PROCEDURE — 0B9F8ZX DRAINAGE OF RIGHT LOWER LUNG LOBE, VIA NATURAL OR ARTIFICIAL OPENING ENDOSCOPIC, DIAGNOSTIC: ICD-10-PCS | Performed by: INTERNAL MEDICINE

## 2020-05-17 PROCEDURE — 0B9F8ZZ DRAINAGE OF RIGHT LOWER LUNG LOBE, VIA NATURAL OR ARTIFICIAL OPENING ENDOSCOPIC: ICD-10-PCS | Performed by: INTERNAL MEDICINE

## 2020-05-17 PROCEDURE — 31624 DX BRONCHOSCOPE/LAVAGE: CPT | Performed by: INTERNAL MEDICINE

## 2020-05-17 PROCEDURE — A9576 INJ PROHANCE MULTIPACK: HCPCS | Performed by: HOSPITALIST

## 2020-05-17 PROCEDURE — 82803 BLOOD GASES ANY COMBINATION: CPT

## 2020-05-17 PROCEDURE — 700111 HCHG RX REV CODE 636 W/ 250 OVERRIDE (IP): Performed by: PSYCHIATRY & NEUROLOGY

## 2020-05-17 PROCEDURE — 71045 X-RAY EXAM CHEST 1 VIEW: CPT

## 2020-05-17 PROCEDURE — 87070 CULTURE OTHR SPECIMN AEROBIC: CPT

## 2020-05-17 RX ORDER — MAGNESIUM SULFATE HEPTAHYDRATE 40 MG/ML
2 INJECTION, SOLUTION INTRAVENOUS ONCE
Status: COMPLETED | OUTPATIENT
Start: 2020-05-17 | End: 2020-05-17

## 2020-05-17 RX ORDER — FUROSEMIDE 10 MG/ML
40 INJECTION INTRAMUSCULAR; INTRAVENOUS
Status: DISCONTINUED | OUTPATIENT
Start: 2020-05-17 | End: 2020-05-24

## 2020-05-17 RX ADMIN — ENOXAPARIN SODIUM 40 MG: 100 INJECTION SUBCUTANEOUS at 16:55

## 2020-05-17 RX ADMIN — VALPROATE SODIUM 750 MG: 100 INJECTION, SOLUTION INTRAVENOUS at 10:11

## 2020-05-17 RX ADMIN — AMPICILLIN SODIUM AND SULBACTAM SODIUM 3 G: 2; 1 INJECTION, POWDER, FOR SOLUTION INTRAMUSCULAR; INTRAVENOUS at 16:55

## 2020-05-17 RX ADMIN — PROPOFOL 50 MCG/KG/MIN: 10 INJECTION, EMULSION INTRAVENOUS at 07:25

## 2020-05-17 RX ADMIN — TOPIRAMATE 200 MG: 100 TABLET, FILM COATED ORAL at 17:04

## 2020-05-17 RX ADMIN — LEVETIRACETAM 1500 MG: 100 INJECTION, SOLUTION, CONCENTRATE INTRAVENOUS at 05:21

## 2020-05-17 RX ADMIN — ACETAMINOPHEN 650 MG: 325 TABLET, FILM COATED ORAL at 21:33

## 2020-05-17 RX ADMIN — TOPIRAMATE 200 MG: 100 TABLET, FILM COATED ORAL at 05:23

## 2020-05-17 RX ADMIN — SODIUM CHLORIDE 300 MG: 9 INJECTION, SOLUTION INTRAVENOUS at 08:42

## 2020-05-17 RX ADMIN — DOCUSATE SODIUM 50 MG AND SENNOSIDES 8.6 MG 2 TABLET: 8.6; 5 TABLET, FILM COATED ORAL at 05:22

## 2020-05-17 RX ADMIN — AMPICILLIN SODIUM AND SULBACTAM SODIUM 3 G: 2; 1 INJECTION, POWDER, FOR SOLUTION INTRAMUSCULAR; INTRAVENOUS at 23:13

## 2020-05-17 RX ADMIN — PROPOFOL 50 MCG/KG/MIN: 10 INJECTION, EMULSION INTRAVENOUS at 00:34

## 2020-05-17 RX ADMIN — PROPOFOL 50 MCG/KG/MIN: 10 INJECTION, EMULSION INTRAVENOUS at 04:01

## 2020-05-17 RX ADMIN — DEXAMETHASONE SODIUM PHOSPHATE 4 MG: 4 INJECTION, SOLUTION INTRA-ARTICULAR; INTRALESIONAL; INTRAMUSCULAR; INTRAVENOUS; SOFT TISSUE at 05:23

## 2020-05-17 RX ADMIN — FAMOTIDINE 20 MG: 20 TABLET ORAL at 17:04

## 2020-05-17 RX ADMIN — PROPOFOL 40 MCG/KG/MIN: 10 INJECTION, EMULSION INTRAVENOUS at 11:11

## 2020-05-17 RX ADMIN — SODIUM CHLORIDE 300 MG: 9 INJECTION, SOLUTION INTRAVENOUS at 22:40

## 2020-05-17 RX ADMIN — DOCUSATE SODIUM 50 MG AND SENNOSIDES 8.6 MG 2 TABLET: 8.6; 5 TABLET, FILM COATED ORAL at 17:04

## 2020-05-17 RX ADMIN — PROPOFOL 80 MCG/KG/MIN: 10 INJECTION, EMULSION INTRAVENOUS at 15:35

## 2020-05-17 RX ADMIN — LEVETIRACETAM 1500 MG: 100 INJECTION, SOLUTION, CONCENTRATE INTRAVENOUS at 17:02

## 2020-05-17 RX ADMIN — DEXAMETHASONE SODIUM PHOSPHATE 4 MG: 4 INJECTION, SOLUTION INTRA-ARTICULAR; INTRALESIONAL; INTRAMUSCULAR; INTRAVENOUS; SOFT TISSUE at 17:04

## 2020-05-17 RX ADMIN — PROPOFOL 40 MCG/KG/MIN: 10 INJECTION, EMULSION INTRAVENOUS at 19:14

## 2020-05-17 RX ADMIN — GADOTERIDOL 18 ML: 279.3 INJECTION, SOLUTION INTRAVENOUS at 00:14

## 2020-05-17 RX ADMIN — ACETAMINOPHEN 650 MG: 325 TABLET, FILM COATED ORAL at 16:55

## 2020-05-17 RX ADMIN — SODIUM CHLORIDE 500 ML: 3 INJECTION, SOLUTION INTRAVENOUS at 10:57

## 2020-05-17 RX ADMIN — VALPROATE SODIUM 750 MG: 100 INJECTION, SOLUTION INTRAVENOUS at 20:25

## 2020-05-17 RX ADMIN — POLYETHYLENE GLYCOL 3350 1 PACKET: 17 POWDER, FOR SOLUTION ORAL at 11:05

## 2020-05-17 RX ADMIN — FUROSEMIDE 40 MG: 10 INJECTION, SOLUTION INTRAMUSCULAR; INTRAVENOUS at 14:19

## 2020-05-17 RX ADMIN — POTASSIUM BICARBONATE 50 MEQ: 978 TABLET, EFFERVESCENT ORAL at 11:03

## 2020-05-17 RX ADMIN — PROPOFOL 40 MCG/KG/MIN: 10 INJECTION, EMULSION INTRAVENOUS at 23:13

## 2020-05-17 RX ADMIN — LOSARTAN POTASSIUM 100 MG: 50 TABLET, FILM COATED ORAL at 17:04

## 2020-05-17 RX ADMIN — FAMOTIDINE 20 MG: 20 TABLET ORAL at 05:23

## 2020-05-17 RX ADMIN — MAGNESIUM SULFATE 2 G: 2 INJECTION INTRAVENOUS at 11:02

## 2020-05-17 NOTE — THERAPY
Missed Therapy     Patient Name: Ahmet Escobedo  Age:  60 y.o., Sex:  male  Medical Record #: [id number]  Today's Date: 5/17/2020    Discussed missed therapy with RN       05/17/20 0812   Treatment Variance   Reason For Missed Therapy Medical - Patient on Hold from Therapy  (intubated)

## 2020-05-17 NOTE — PROCEDURES
VIDEO ELECTROENCEPHALOGRAM REPORT        Referring provider: Dr. Velazquez.      DOS: 5/17/2020 (total recording of 23 hours and 50 minutes).      INDICATION:  Ahmet Escobedo 60 y.o. male presenting with h/o brain tumor, seizures.      CURRENT ANTIEPILEPTIC REGIMEN: Levetiracetam 1500 mg q 12 hrs, Lacosamide 300 mg q 12 hrs, Valproic Acid 750 mg q 12 hrs, and Topiramate 200 mg q 12 hrs. Sedated with Propofol.      TECHNIQUE: 30 channel video electroencephalogram (EEG) was performed in accordance with the international 10-20 system. The study was reviewed in bipolar and referential montages. The recording examined a mildly sedated patient with arousals.      DESCRIPTION OF THE RECORD:  Waxing and waning of the cerebral electrical activity. There is slowing in the left hemisphere. Continuous left posterior quadrant spikes/polyspikes and sharps noted throughout the study, exhibiting a left lateralized periodic pattern, but no seizures captured.      ACTIVATION PROCEDURES:   Not performed.      EKG: sampling of the EKG recording demonstrated sinus rhythm.      EVENTS: None.      INTERPRETATION:  This is an abnormal video EEG recording in a mildly sedated patient. There is slowing in the left hemisphere. Continuous left posterior quadrant spikes/polyspikes and sharps noted throughout the study, exhibiting a left lateralized periodic pattern, but no seizures captured.  The patient remains at high risk for seizure recurrence. The findings suggest a large underlying area of cortical irritability and structural abnormality. Clinical and radiological correlation is recommended.     Updates provided to Dr. Jad Wise MD   Epilepsy and Neurodiagnostics.   Clinical  of Neurology [facility].   Diplomate in Neurology, Epilepsy, and Electrodiagnostic Medicine.   Office: [phone]  Fax: [phone]

## 2020-05-17 NOTE — PROCEDURES
Procedures  DATE OF OPERATION: 5/17/2020     PREOPERATIVE DIAGNOSIS: purulent secretions, increase in oxygen requirement and chest x-ray infiltrate chest xray changes    POSTOPERATIVE DIAGNOSIS: same     PROCEDURE PERFORMED: Fiberoptic bronchoscopy with bronchoalveolar lavage    SURGEON: Colton Perez M.D.    ANESTHESIA: Intravenous sedation, analgesia, and pharmacologic restraint     INDICATIONS: The patient is a 60 y.o. male with acute respiratory failure with hypoxia, chest xray changes, increased tracheal secretions.      FINDINGS: THick purulent secretions present in trachea and right and left mainstem, suctioned until clear.  Purulent secretions RLL, LLL, RML and RUL.  Limited TRINY.  Erythema present in RLL.  There were more secretions on right than left side.  No endobronchial lesions.  ET tube adequate placement.  All segments including RUL, RLL, RML, TRINY and LLL lavaged with normal saline and suctioned until clear.  Bronchoalveolar lavage of a RLL segment with 100 ml normal saline insipated for lavage and suctioned into sterile trap and submitted for cultures and gram stain.      SPECIMEN: Bronchoalveolar lavage for cultures    PROCEDURE: Following informed consent from daughter and mother, the patient was properly identified and optimally positioned in bed. He was preoxygenated with 100% oxygen and placed on a regular ventilatory rate. Intravenous sedation, analgesia, and pharmacologic restraint was administered.    The fiberoptic bronchoscope was advanced through the indwelling endotracheal tube.  The upper airways were suctioned. The airways were systematically and sequentially inspected and lavaged including the RUL, RLL, LLL, TRINY and RML. Bronchoalveolar lavage of a RLL segment with 100 ml normal saline insipated for lavage and suctioned into sterile trap and submitted for cultures and gram stain.     The patient tolerated the procedure well. There were no apparent complications.     ____________________________________   Colton Perez M.D.    DD: 5/17/2020  2:51 PM

## 2020-05-17 NOTE — PROGRESS NOTES
Critical Care Progress Note    Date of admission  5/13/2020    Chief Complaint  59yo male with hx of GBM s/p resection in 2/2020, hx of seizures now admitted for recurrent seizures. Pt is on keppra 750mg PO BID and low dose dexamethasone 0.25mg PO daily at home. He presented after having right sided convulsive movements and slurred speech.      Pt was started on topamax, and valproate, and vimpat since admission. Keppra dose was increased to 1500 Q12H. On dexamethasone 4 Q12H. Given ativan of total 6mg since admission. Pt is somnolent and given the quick escalation of his antiseizure meds to control his seizures, pt's transferred to ICU for further management.  Upon ICU arrival pt was obtunded       Hospital Course          Interval Problem Update  Reviewed last 24 hour events:  On propofol high dose, wean per continuous eeg  On mechanical ventilator  Lasix 40 mg daily started  MRI ? Recurrent lesions, 3 mm shift  Neurology spoke to Neurosurgery [facility] to compare to prior   Tolerating wean per neuro regarding eeg on reassessment.    Review of Systems  Review of Systems   Unable to perform ROS: Intubated        Vital Signs for last 24 hours   Temp:  [36.6 °C (97.8 °F)-36.7 °C (98 °F)] 36.6 °C (97.8 °F)  Pulse:  [53-89] 63  Resp:  [16-73] 21  BP: (104-169)/(65-97) 124/66  SpO2:  [81 %-100 %] 99 %    Hemodynamic parameters for last 24 hours       Respiratory Information for the last 24 hours  Vent Mode: APVCMV  Rate (breaths/min): 20  Vt Target (mL): 440  PEEP/CPAP: 8  MAP: 11    Physical Exam   Physical Exam  Vitals signs and nursing note reviewed.   Constitutional:       General: He is not in acute distress.     Appearance: He is ill-appearing. He is not toxic-appearing or diaphoretic.   HENT:      Head: Normocephalic and atraumatic.      Right Ear: External ear normal.      Left Ear: External ear normal.      Nose: No congestion or rhinorrhea.      Mouth/Throat:      Mouth: Mucous membranes are moist.      Pharynx:  Oropharynx is clear. No oropharyngeal exudate or posterior oropharyngeal erythema.   Eyes:      General: No scleral icterus.        Right eye: No discharge.         Left eye: No discharge.      Conjunctiva/sclera: Conjunctivae normal.      Pupils: Pupils are equal, round, and reactive to light.   Neck:      Musculoskeletal: Normal range of motion. No neck rigidity or muscular tenderness.   Cardiovascular:      Rate and Rhythm: Normal rate and regular rhythm.      Pulses: Normal pulses.      Heart sounds: Normal heart sounds. No murmur.   Pulmonary:      Effort: Respiratory distress present.      Breath sounds: No stridor. No wheezing, rhonchi or rales.      Comments: On ventilator  Chest:      Chest wall: No tenderness.   Abdominal:      General: There is no distension.      Palpations: There is no mass.      Tenderness: There is no abdominal tenderness. There is no guarding.   Musculoskeletal:         General: No swelling, tenderness or deformity.      Right lower leg: No edema.      Left lower leg: No edema.   Lymphadenopathy:      Cervical: No cervical adenopathy.   Skin:     Coloration: Skin is not jaundiced or pale.      Findings: No bruising, erythema, lesion or rash.   Neurological:      Mental Status: He is alert.      Cranial Nerves: No cranial nerve deficit.      Sensory: No sensory deficit.      Motor: No weakness.      Coordination: Coordination normal.      Gait: Gait normal.      Comments: Non responsive, sedated high dose propofol for seizures         Medications  Current Facility-Administered Medications   Medication Dose Route Frequency Provider Last Rate Last Dose   • 3% sodium chloride (HYPERTONIC SALINE) 500mL infusion 500 mL  500 mL Intravenous Continuous Colton Perez M.D. 50 mL/hr at 05/17/20 0600     • Pharmacy Consult: Enteral tube insertion - review meds/change route/product selection  1 Each Other PHARMACY TO DOSE Colton Perez M.D.       • losartan (COZAAR) tablet 100 mg  100 mg Enteral  Tube DAILY Colton Perez M.D.   Stopped at 05/16/20 1800   • topiramate (TOPAMAX) tablet 200 mg  200 mg Enteral Tube Q12HRS Colton Perez M.D.   200 mg at 05/17/20 0523   • acetaminophen (TYLENOL) tablet 650 mg  650 mg Enteral Tube Q6HRS PRN Colton Perez M.D.       • ondansetron (ZOFRAN ODT) dispertab 4 mg  4 mg Enteral Tube Q4HRS PRN Colton Perez M.D.       • promethazine (PHENERGAN) tablet 12.5-25 mg  12.5-25 mg Enteral Tube Q4HRS PRN Colton Perez M.D.       • Pharmacy Consult Request ...Pain Management Review 1 Each  1 Each Other PHARMACY TO DOSE Colton Perez M.D.        And   • oxyCODONE immediate-release (ROXICODONE) tablet 2.5 mg  2.5 mg Enteral Tube Q3HRS PRN Colton Perez M.D.        And   • oxyCODONE immediate-release (ROXICODONE) tablet 5 mg  5 mg Enteral Tube Q3HRS PRN Colton Perez M.D.       • lacosamide (VIMPAT) 300 mg in  mL ivpb  300 mg Intravenous Q12HRS Jass Velazquez M.D.   Stopped at 05/16/20 2159   • dexamethasone (DECADRON) injection 4 mg  4 mg Intravenous Q12HRS Guido Pierre M.D.   4 mg at 05/17/20 0523   • Respiratory Therapy Consult   Nebulization Continuous RT Nikhil Erickson D.O.       • ipratropium-albuterol (DUONEB) nebulizer solution  3 mL Nebulization Q2HRS PRN (RT) BRADY Reddy.O.       • famotidine (PEPCID) tablet 20 mg  20 mg Enteral Tube Q12HRS BRADY Reddy.O.   20 mg at 05/17/20 0523    Or   • famotidine (PEPCID) injection 20 mg  20 mg Intravenous Q12HRS BRADY Reddy.O.   20 mg at 05/16/20 0500   • senna-docusate (PERICOLACE or SENOKOT S) 8.6-50 MG per tablet 2 Tab  2 Tab Enteral Tube BID Nikhil Erickson D.O.   2 Tab at 05/17/20 0522    And   • polyethylene glycol/lytes (MIRALAX) PACKET 1 Packet  1 Packet Enteral Tube QDAY PRN Nikhil Erickson D.O.        And   • magnesium hydroxide (MILK OF MAGNESIA) suspension 30 mL  30 mL Enteral Tube QDAY PRN Nikhil Erickson D.O.        And   • bisacodyl (DULCOLAX) suppository 10 mg  10 mg Rectal QDAY PRN NICK ReddyO.       •  MD Alert...ICU Electrolyte Replacement per Pharmacy   Other PHARMACY TO DOSE Nikhil Erickson D.O.       • lidocaine (XYLOCAINE) 1 % injection 1-2 mL  1-2 mL Tracheal Tube Q30 MIN PRN Nikhil Erickson D.O.       • fentaNYL (SUBLIMAZE) 50 mcg/mL in 50mL (Continuous Infusion)  0-300 mcg/hr Intravenous Continuous Nikhil Erickson D.O.   Stopped at 05/15/20 1930   • propofol (DIPRIVAN) injection  0-80 mcg/kg/min Intravenous Continuous Nikhil Erickson D.O. 28.1 mL/hr at 05/17/20 0401 50 mcg/kg/min at 05/17/20 0401   • ondansetron (ZOFRAN) syringe/vial injection 4 mg  4 mg Intravenous Q4HRS PRN Igor Carlos M.D.       • promethazine (PHENERGAN) suppository 12.5-25 mg  12.5-25 mg Rectal Q4HRS PRN Igor Carlos M.D.       • prochlorperazine (COMPAZINE) injection 5-10 mg  5-10 mg Intravenous Q4HRS PRN Igor Carlos M.D.       • LORazepam (ATIVAN) injection 4 mg  4 mg Intravenous Q10 MIN PRN Igor Carlos M.D.   4 mg at 05/15/20 1545   • levETIRAcetam (KEPPRA) 1,500 mg in  mL IVPB  1,500 mg Intravenous Q12HRS Igor Carlos M.D.   Stopped at 05/17/20 0536   • hydrALAZINE (APRESOLINE) injection 10 mg  10 mg Intravenous Q6HRS PRN Guido Pierre M.D.       • valproate (DEPACON) 750 mg in  mL IVPB  750 mg Intravenous BID Jass Velazquez M.D.   Stopped at 05/16/20 2159       Fluids    Intake/Output Summary (Last 24 hours) at 5/17/2020 0701  Last data filed at 5/17/2020 0600  Gross per 24 hour   Intake 3004.12 ml   Output 1595 ml   Net 1409.12 ml       Laboratory  Recent Labs     05/15/20  2100 05/16/20  0400 05/17/20  0516   ISTATAPH 7.353* 7.393* 7.427   ISTATAPCO2 43.0* 33.6 30.7   ISTATAPO2 313* 136* 88*   ISTATATCO2 25 21 21   PXTJESA6RPV 100* 99 97   ISTATARTHCO3 23.9 20.5 20.2   ISTATARTBE -2 -4 -3   ISTATTEMP 96.9 F 96.9 F 97.9 F   ISTATFIO2 100 40 30   ISTATSPEC Arterial Arterial Arterial   ISTATAPHTC 7.366* 7.407 7.433   PXZOZONH4HC 308* 130* 86         Recent Labs     05/15/20  1021  05/16/20  0353 05/16/20  1540  05/16/20 2211 05/17/20 0407   SODIUM 133*   < > 137 143 143 140   POTASSIUM 3.7   < > 3.7 3.5* 3.6 3.4*   CHLORIDE 96   < > 104 107 109 109   CO2 22   < > 18* 22 22 22   BUN 21   < > 20 21 21 22   CREATININE 0.80   < > 0.62 0.74 0.78 0.78   MAGNESIUM 1.7  --  2.0  --   --  1.8   PHOSPHORUS 2.9  --  3.8  --   --  3.6   CALCIUM 9.6   < > 9.0 8.4* 8.0* 8.5    < > = values in this interval not displayed.     Recent Labs     05/15/20  0331  05/16/20  1540 05/16/20 2211 05/17/20 0407   ALTSGPT 55*  --   --   --   --    ASTSGOT 18  --   --   --   --    ALKPHOSPHAT 37  --   --   --   --    TBILIRUBIN 0.4  --   --   --   --    PREALBUMIN  --   --   --   --  25.6   GLUCOSE 188*   < > 130* 164* 147*    < > = values in this interval not displayed.     Recent Labs     05/15/20  0331 05/16/20  0353 05/17/20  0407   WBC 11.4* 12.8* 9.0   NEUTSPOLYS 82.90* 87.40* 82.80*   LYMPHOCYTES 8.10* 5.60* 7.80*   MONOCYTES 5.80 5.20 6.80   EOSINOPHILS 0.30 0.00 0.00   BASOPHILS 0.40 0.20 0.10   ASTSGOT 18  --   --    ALTSGPT 55*  --   --    ALKPHOSPHAT 37  --   --    TBILIRUBIN 0.4  --   --      Recent Labs     05/15/20  0331 05/16/20  0353 05/17/20  0407   RBC 4.54* 4.52* 4.02*   HEMOGLOBIN 14.1 14.1 12.7*   HEMATOCRIT 41.6* 42.9 38.5*   PLATELETCT 160* 183 164       Imaging  X-Ray:  I have personally reviewed the images and compared with prior images.  EKG:  I have personally reviewed the images and compared with prior images.  CT:    Reviewed  MRI:   Reviewed    Assessment/Plan  * Status epilepticus (HCC)- (present on admission)  Assessment & Plan  Continue keppra, valproate, topamax and vimpat  Propofol drip  Continue continuous EEG, wean propofol as appropriate, will wean by 10 mcg/kg/min increments every 2 hours once seizures controlled    Acute respiratory failure with hypoxia (HCC)  Assessment & Plan  Intubated for airway protection given drip medication required for seizure control  sbt once seizures resolved and able to wean  propofol  Extubate once appropriate  aspiration    GBM (glioblastoma multiforme) (HCC)- (present on admission)  Assessment & Plan  S/p resection in 2/2020 at [facility]  Pt is undergoing chemo/radiation  On dexamethasone 4mg IV Q12H    Hyperglycemia- (present on admission)  Assessment & Plan  ssi if necessary  On steroids    Leukocytosis- (present on admission)  Assessment & Plan  ? Aspiration  On antibiotics    Hypokalemia- (present on admission)  Assessment & Plan  Goal > 4    Hyponatremia  Assessment & Plan  Goal 140-150, hypertonic saline drip    Essential hypertension- (present on admission)  Assessment & Plan  Keep sbp < 160       VTE:  Contraindicated  Ulcer: H2 Antagonist  Lines: Central Line  Ongoing indication addressed and Novak Catheter  Ongoing indication addressed    I have performed a physical exam and reviewed and updated ROS and Plan today (5/17/2020). In review of yesterday's note (5/16/2020), there are no changes except as documented above.     Discussed patient condition and risk of morbidity and/or mortality with Family, RN, RT, Pharmacy, Code status disscussed, Charge nurse / hot rounds and neurology     The patient remains critically ill.  He is on a mechanical ventilator.  He is requiring propofol drip at high dosing for continued seizures, wean once appropriate per EEG in 10 mcg/kg/min increments every 2 hours.  Hypertonic saline for sodium goal 140-150, adjusting per sequential sodium labs.  Critical care time = 35 minutes in directly providing and coordinating critical care and extensive data review.  No time overlap and excludes procedures.

## 2020-05-17 NOTE — PROGRESS NOTES
Neurology Progress Note  Neurohospitalist Service, [facility]    Referring Physician: Guido Pierre M.D.    No chief complaint on file.      HPI: Refer to initial documented Neurology H&P, as detailed in the patient's chart.    Interval History May 17, 2020:   No new events overnight.  No seizures on EEG.  Does show frequent PLEDs on the left side.  MRI done.    Review of systems: In addition to what is detailed in the HPI and/or updated in the interval history, all other systems reviewed and are negative.    Past Medical History:    has a past medical history of GBM (glioblastoma multiforme) (HCC) and Hypertension.    FHx:  family history includes Heart Disease in his father and mother.    SHx:   reports that he has never smoked. He has never used smokeless tobacco. He reports previous alcohol use. He reports that he does not use drugs.    Medications:    Current Facility-Administered Medications:   •  3% sodium chloride (HYPERTONIC SALINE) 500mL infusion 500 mL, 500 mL, Intravenous, Continuous, Colton Perez M.D., Last Rate: 50 mL/hr at 05/17/20 0600  •  Pharmacy Consult: Enteral tube insertion - review meds/change route/product selection, 1 Each, Other, PHARMACY TO DOSE, Colton Perez M.D.  •  losartan (COZAAR) tablet 100 mg, 100 mg, Enteral Tube, DAILY, Colton Perez M.D., Stopped at 05/16/20 1800  •  topiramate (TOPAMAX) tablet 200 mg, 200 mg, Enteral Tube, Q12HRS, Colton Perez M.D., 200 mg at 05/17/20 0523  •  acetaminophen (TYLENOL) tablet 650 mg, 650 mg, Enteral Tube, Q6HRS PRN, Colton Perez M.D.  •  ondansetron (ZOFRAN ODT) dispertab 4 mg, 4 mg, Enteral Tube, Q4HRS PRN, Colton Perez M.D.  •  promethazine (PHENERGAN) tablet 12.5-25 mg, 12.5-25 mg, Enteral Tube, Q4HRS PRN, Colton Perez M.D.  •  Notify provider if pain remains uncontrolled, , , CONTINUOUS **AND** Use the numeric rating scale (NRS-11) on regular floors and Critical-Care Pain Observation Tool (CPOT) on  ICUs/Trauma to assess pain, , , CONTINUOUS **AND** Pulse Ox (Oximetry), , , CONTINUOUS **AND** Pharmacy Consult Request ...Pain Management Review 1 Each, 1 Each, Other, PHARMACY TO DOSE **AND** If patient difficult to arouse and/or has respiratory depression, stop any opiates that are currently infusing and call a Rapid Response., , , CONTINUOUS **AND** oxyCODONE immediate-release (ROXICODONE) tablet 2.5 mg, 2.5 mg, Enteral Tube, Q3HRS PRN **AND** oxyCODONE immediate-release (ROXICODONE) tablet 5 mg, 5 mg, Enteral Tube, Q3HRS PRN **AND** [DISCONTINUED] morphine (pf) 4 MG/ML injection 2 mg, 2 mg, Intravenous, Q3HRS PRN, Igor Carlos M.D.  •  lacosamide (VIMPAT) 300 mg in  mL ivpb, 300 mg, Intravenous, Q12HRS, Jass Velazquez M.D., Stopped at 05/17/20 0942  •  dexamethasone (DECADRON) injection 4 mg, 4 mg, Intravenous, Q12HRS, Guido Pierre M.D., 4 mg at 05/17/20 0523  •  Respiratory Therapy Consult, , Nebulization, Continuous RT, BRADY Reddy.DASH.  •  ipratropium-albuterol (DUONEB) nebulizer solution, 3 mL, Nebulization, Q2HRS PRN (RT), BRADY Reddy.O.  •  famotidine (PEPCID) tablet 20 mg, 20 mg, Enteral Tube, Q12HRS, 20 mg at 05/17/20 0523 **OR** famotidine (PEPCID) injection 20 mg, 20 mg, Intravenous, Q12HRS, NICK ReddyO., 20 mg at 05/16/20 0500  •  senna-docusate (PERICOLACE or SENOKOT S) 8.6-50 MG per tablet 2 Tab, 2 Tab, Enteral Tube, BID, 2 Tab at 05/17/20 0522 **AND** polyethylene glycol/lytes (MIRALAX) PACKET 1 Packet, 1 Packet, Enteral Tube, QDAY PRN **AND** magnesium hydroxide (MILK OF MAGNESIA) suspension 30 mL, 30 mL, Enteral Tube, QDAY PRN **AND** bisacodyl (DULCOLAX) suppository 10 mg, 10 mg, Rectal, QDAY PRN, Nikhil Erickson D.O.  •  MD Alert...ICU Electrolyte Replacement per Pharmacy, , Other, PHARMACY TO DOSE, Nikhil Erickson D.O.  •  lidocaine (XYLOCAINE) 1 % injection 1-2 mL, 1-2 mL, Tracheal Tube, Q30 MIN PRN, Nikhil Erickson D.O.  •  fentaNYL (SUBLIMAZE) 50 mcg/mL in 50mL (Continuous  Infusion), 0-300 mcg/hr, Intravenous, Continuous, Nikhil Erickson D.O., Stopped at 05/15/20 1930  •  propofol (DIPRIVAN) injection, 0-80 mcg/kg/min, Intravenous, Continuous, Last Rate: 28.1 mL/hr at 05/17/20 0725, 50 mcg/kg/min at 05/17/20 0725 **AND** Triglycerides Starting now and then Every 3 Days, , , Every 3 Days (0300), Nikhil Erickson D.O.  •  ondansetron (ZOFRAN) syringe/vial injection 4 mg, 4 mg, Intravenous, Q4HRS PRN, Igor Carlos M.D.  •  promethazine (PHENERGAN) suppository 12.5-25 mg, 12.5-25 mg, Rectal, Q4HRS PRN, Igor Carlos M.D.  •  prochlorperazine (COMPAZINE) injection 5-10 mg, 5-10 mg, Intravenous, Q4HRS PRN, Igor Carlos M.D.  •  LORazepam (ATIVAN) injection 4 mg, 4 mg, Intravenous, Q10 MIN PRN, Igor Carlos M.D., 4 mg at 05/15/20 1545  •  levETIRAcetam (KEPPRA) 1,500 mg in  mL IVPB, 1,500 mg, Intravenous, Q12HRS, Igor Carlos M.D., Stopped at 05/17/20 0536  •  hydrALAZINE (APRESOLINE) injection 10 mg, 10 mg, Intravenous, Q6HRS PRN, Guido Pierre M.D.  •  valproate (DEPACON) 750 mg in  mL IVPB, 750 mg, Intravenous, BID, Jass Velazquez M.D., Stopped at 05/16/20 2059    Physical Examination:     Vitals:    05/17/20 0600 05/17/20 0652 05/17/20 0700 05/17/20 0800   BP: 124/66  131/70 125/69   Pulse: 70 63 63 63   Resp: (!) 32 (!) 21 19 17   Temp:       TempSrc: Bladder      SpO2: (!) 81% 99% 98% 98%   Weight:       Height:           Patient is now intubated sedated.  Does not awake.  Does not follow commands.  Pupils are small but reactive.  There is no nystagmus.  Negative doll's eye.  Positive gag reflex.  Positive corneal reflex.  Areflexic.  No clonus.  Does not move extremities to noxious stimuli.      Objective Data:    Labs:  Lab Results   Component Value Date/Time    PROTHROMBTM 12.0 05/13/2020 10:36 PM    INR 0.87 05/13/2020 10:36 PM      Lab Results   Component Value Date/Time    WBC 9.0 05/17/2020 04:07 AM    RBC 4.02 (L) 05/17/2020 04:07 AM    HEMOGLOBIN 12.7  (L) 05/17/2020 04:07 AM    HEMATOCRIT 38.5 (L) 05/17/2020 04:07 AM    MCV 95.8 05/17/2020 04:07 AM    MCH 31.6 05/17/2020 04:07 AM    MCHC 33.0 (L) 05/17/2020 04:07 AM    MPV 10.2 05/17/2020 04:07 AM    NEUTSPOLYS 82.80 (H) 05/17/2020 04:07 AM    LYMPHOCYTES 7.80 (L) 05/17/2020 04:07 AM    MONOCYTES 6.80 05/17/2020 04:07 AM    EOSINOPHILS 0.00 05/17/2020 04:07 AM    BASOPHILS 0.10 05/17/2020 04:07 AM    ANISOCYTOSIS 1+ 05/13/2020 10:36 PM      Lab Results   Component Value Date/Time    SODIUM 140 05/17/2020 04:07 AM    POTASSIUM 3.4 (L) 05/17/2020 04:07 AM    CHLORIDE 109 05/17/2020 04:07 AM    CO2 22 05/17/2020 04:07 AM    GLUCOSE 147 (H) 05/17/2020 04:07 AM    BUN 22 05/17/2020 04:07 AM    CREATININE 0.78 05/17/2020 04:07 AM      Lab Results   Component Value Date/Time    TRIGLYCERIDE 554 (H) 05/17/2020 04:07 AM       Lab Results   Component Value Date/Time    ALKPHOSPHAT 37 05/15/2020 03:31 AM    ASTSGOT 18 05/15/2020 03:31 AM    ALTSGPT 55 (H) 05/15/2020 03:31 AM    TBILIRUBIN 0.4 05/15/2020 03:31 AM        Imaging/Testing:  DX-CHEST-PORTABLE (1 VIEW)   Final Result         1. No significant interval change.      DX-ABDOMEN FOR TUBE PLACEMENT   Final Result      Cortrak feeding tube tip projects in the region of the distal stomach.      DX-CHEST-LIMITED (1 VIEW)   Final Result      1.  New right subclavian vein central line tip projects in satisfactory position. No pneumothorax.      2.  Satisfactory position of endotracheal tube.      3.  Feeding tube is now present.      4.  No focal pulmonary consolidation.      DX-CHEST-PORTABLE (1 VIEW)   Final Result      1.  Interval placement of an endotracheal tube which terminates in satisfactory position at the level of the aortic arch.   2.  Mild right basilar atelectasis and/or consolidation.      DX-CHEST-PORTABLE (1 VIEW)   Final Result      No radiographic evidence of acute cardiopulmonary process.      CT-CTA NECK WITH & W/O-POST PROCESSING   Final Result       CT angiogram of the neck within normal limits.      CT-CTA HEAD WITH & W/O-POST PROCESS   Final Result      Left parietal mass resection with some blood vessels along the  operative bed margins. These could indicate healing response although local recurrence is also possible. Comparison with prior studies and correlation with operative history may prove    helpful to clarify      No acute arterial occlusion is identified      CT-HEAD W/O   Final Result      No acute intracranial hemorrhage is identified.      Left parietal vertex craniotomy with underlying vasogenic edema and mass with associated mild rightward midline shift, moderate left lateral ventricular effacement      MR-BRAIN-WITH & W/O    (Results Pending)         Assessment and Plan:  60-year-old male status post GBM resection on the left side.  Presenting with status epilepticus.  Increased edema over the left parietal region.  In the past day patient became lethargic not responsive at times.  Was given Ativan for observed seizure by nursing staff yesterday afternoon.  After which she was transferred to ICU and intubated for airway protection.  On continuous EEG.  While on propofol drip there has been no seizures.  PLEDs on the left side      Plan:  1.  Continuous EEG  2.  Vimpat 300 mg twice daily  3.  Keppra 1500 mg twice daily  4. Depakote 750 mg twice daily  5.  Topamax 200 mg twice daily  6.  Start weaning profile today    Follow-up MRI results later today.    MRI back shows 2 small enhancing lesions not sure this is reoccurrence versus postop changes.  I reviewed this  information to his daughter Linh and his significant other.  They said [facility] will be able to compare to old MRIs.  I believe [facility] has access to our computer system.  Also explained to Linh the plan for weaning sedation today and continue with EEG monitor for recurrence of seizures.    The evaluation of the patient, and recommended management, was discussed with the resident staff. I  have performed a physical exam and reviewed and updated ROS and Plan today (5/17/2020). In review of yesterday's note (5/16/2020), there are no changes except as documented above.    This chart was partially generated using voice recognition technology and sound alike word replacement may be present, best efforts were made to make the chart accurate.    Jass Velazquez MD  Board Certified Neurology, ABPN  t) [phone]

## 2020-05-17 NOTE — PROCEDURES
Procedures  Procedure: right subclavian central line insertion, us guided    : Dr Perez    Reason: hypertonic saline for intracranial edema and respiratory failure with hypoxia    The patient's right shoulder region was prepped and draped in sterile fashion using chlorhexidine scrub. Anesthesia was achieved with 1% lidocaine. The right subclavian vein was accessed under ultrasound guidance using a finder needle Venous blood was withdrawn and the needle was withdrawn after a guidewire was advanced through the needle catheter. A small incision was made with a blade scalpel and the needle was exchanged for a dilator over the guidewire until appropriate dilation was obtained. The dilator was removed and a central venous triple-lumen catheter was advanced over the guidewire and secured into place with 2 sutures at 15 cm. At time of procedure completion, all ports aspirated and flushed properly. Post-procedure x-ray adequate placement and no pneumothorax.  Sterile procedure done throughout entire procedure by all participating.    Complications: none    Blood loss: < 3 cc

## 2020-05-18 ENCOUNTER — HOSPITAL ENCOUNTER (OUTPATIENT)
Dept: RADIOLOGY | Facility: MEDICAL CENTER | Age: 60
End: 2020-05-18
Attending: INTERNAL MEDICINE
Payer: OTHER MISCELLANEOUS

## 2020-05-18 LAB
ACTION RANGE TRIGGERED IACRT: NO
ALBUMIN SERPL BCP-MCNC: 3.2 G/DL (ref 3.2–4.9)
ALBUMIN/GLOB SERPL: 1.6 G/DL
ALP SERPL-CCNC: 35 U/L (ref 30–99)
ALT SERPL-CCNC: 27 U/L (ref 2–50)
ANION GAP SERPL CALC-SCNC: 12 MMOL/L (ref 7–16)
AST SERPL-CCNC: 7 U/L (ref 12–45)
BASE EXCESS BLDA CALC-SCNC: -3 MMOL/L (ref -4–3)
BASOPHILS # BLD AUTO: 0.2 % (ref 0–1.8)
BASOPHILS # BLD: 0.02 K/UL (ref 0–0.12)
BILIRUB SERPL-MCNC: 0.2 MG/DL (ref 0.1–1.5)
BODY TEMPERATURE: NORMAL DEGREES
BUN SERPL-MCNC: 29 MG/DL (ref 8–22)
CALCIUM SERPL-MCNC: 8.3 MG/DL (ref 8.5–10.5)
CHLORIDE SERPL-SCNC: 113 MMOL/L (ref 96–112)
CO2 BLDA-SCNC: 22 MMOL/L (ref 20–33)
CO2 SERPL-SCNC: 21 MMOL/L (ref 20–33)
CREAT SERPL-MCNC: 0.83 MG/DL (ref 0.5–1.4)
CRP SERPL HS-MCNC: 16.29 MG/DL (ref 0–0.75)
EOSINOPHIL # BLD AUTO: 0.02 K/UL (ref 0–0.51)
EOSINOPHIL NFR BLD: 0.2 % (ref 0–6.9)
ERYTHROCYTE [DISTWIDTH] IN BLOOD BY AUTOMATED COUNT: 54.6 FL (ref 35.9–50)
GLOBULIN SER CALC-MCNC: 2 G/DL (ref 1.9–3.5)
GLUCOSE SERPL-MCNC: 172 MG/DL (ref 65–99)
HCO3 BLDA-SCNC: 21 MMOL/L (ref 17–25)
HCT VFR BLD AUTO: 39.5 % (ref 42–52)
HGB BLD-MCNC: 12.9 G/DL (ref 14–18)
HOROWITZ INDEX BLDA+IHG-RTO: 250 MM[HG]
IMM GRANULOCYTES # BLD AUTO: 0.12 K/UL (ref 0–0.11)
IMM GRANULOCYTES NFR BLD AUTO: 1.3 % (ref 0–0.9)
INST. QUALIFIED PATIENT IIQPT: YES
LYMPHOCYTES # BLD AUTO: 0.65 K/UL (ref 1–4.8)
LYMPHOCYTES NFR BLD: 7.1 % (ref 22–41)
MAGNESIUM SERPL-MCNC: 2 MG/DL (ref 1.5–2.5)
MCH RBC QN AUTO: 31.5 PG (ref 27–33)
MCHC RBC AUTO-ENTMCNC: 32.7 G/DL (ref 33.7–35.3)
MCV RBC AUTO: 96.6 FL (ref 81.4–97.8)
MONOCYTES # BLD AUTO: 0.68 K/UL (ref 0–0.85)
MONOCYTES NFR BLD AUTO: 7.4 % (ref 0–13.4)
MRSA DNA SPEC QL NAA+PROBE: NORMAL
NEUTROPHILS # BLD AUTO: 7.67 K/UL (ref 1.82–7.42)
NEUTROPHILS NFR BLD: 83.8 % (ref 44–72)
NRBC # BLD AUTO: 0 K/UL
NRBC BLD-RTO: 0 /100 WBC
O2/TOTAL GAS SETTING VFR VENT: 30 %
PCO2 BLDA: 33.4 MMHG (ref 26–37)
PCO2 TEMP ADJ BLDA: 33.6 MMHG (ref 26–37)
PH BLDA: 7.41 [PH] (ref 7.4–7.5)
PH TEMP ADJ BLDA: 7.4 [PH] (ref 7.4–7.5)
PHOSPHATE SERPL-MCNC: 2.3 MG/DL (ref 2.5–4.5)
PLATELET # BLD AUTO: 146 K/UL (ref 164–446)
PMV BLD AUTO: 10.6 FL (ref 9–12.9)
PO2 BLDA: 75 MMHG (ref 64–87)
PO2 TEMP ADJ BLDA: 76 MMHG (ref 64–87)
POTASSIUM SERPL-SCNC: 3.1 MMOL/L (ref 3.6–5.5)
PREALB SERPL-MCNC: 23.9 MG/DL (ref 18–38)
PROT SERPL-MCNC: 5.2 G/DL (ref 6–8.2)
RBC # BLD AUTO: 4.09 M/UL (ref 4.7–6.1)
SAO2 % BLDA: 95 % (ref 93–99)
SIGNIFICANT IND 70042: NORMAL
SITE SITE: NORMAL
SODIUM SERPL-SCNC: 146 MMOL/L (ref 135–145)
SOURCE SOURCE: NORMAL
SPECIMEN DRAWN FROM PATIENT: NORMAL
WBC # BLD AUTO: 9.2 K/UL (ref 4.8–10.8)

## 2020-05-18 PROCEDURE — 80053 COMPREHEN METABOLIC PANEL: CPT

## 2020-05-18 PROCEDURE — 99291 CRITICAL CARE FIRST HOUR: CPT | Performed by: PSYCHIATRY & NEUROLOGY

## 2020-05-18 PROCEDURE — 700111 HCHG RX REV CODE 636 W/ 250 OVERRIDE (IP): Performed by: FAMILY MEDICINE

## 2020-05-18 PROCEDURE — 87641 MR-STAPH DNA AMP PROBE: CPT

## 2020-05-18 PROCEDURE — 4A10X4Z MONITORING OF CENTRAL NERVOUS ELECTRICAL ACTIVITY, EXTERNAL APPROACH: ICD-10-PCS | Performed by: PSYCHIATRY & NEUROLOGY

## 2020-05-18 PROCEDURE — 700105 HCHG RX REV CODE 258: Performed by: HOSPITALIST

## 2020-05-18 PROCEDURE — 700111 HCHG RX REV CODE 636 W/ 250 OVERRIDE (IP): Performed by: INTERNAL MEDICINE

## 2020-05-18 PROCEDURE — 82803 BLOOD GASES ANY COMBINATION: CPT

## 2020-05-18 PROCEDURE — 700102 HCHG RX REV CODE 250 W/ 637 OVERRIDE(OP): Performed by: PSYCHIATRY & NEUROLOGY

## 2020-05-18 PROCEDURE — 71045 X-RAY EXAM CHEST 1 VIEW: CPT

## 2020-05-18 PROCEDURE — 700105 HCHG RX REV CODE 258: Performed by: INTERNAL MEDICINE

## 2020-05-18 PROCEDURE — 36600 WITHDRAWAL OF ARTERIAL BLOOD: CPT

## 2020-05-18 PROCEDURE — 700105 HCHG RX REV CODE 258: Performed by: PSYCHIATRY & NEUROLOGY

## 2020-05-18 PROCEDURE — C9254 INJECTION, LACOSAMIDE: HCPCS | Performed by: PSYCHIATRY & NEUROLOGY

## 2020-05-18 PROCEDURE — A9270 NON-COVERED ITEM OR SERVICE: HCPCS | Performed by: PSYCHIATRY & NEUROLOGY

## 2020-05-18 PROCEDURE — A9270 NON-COVERED ITEM OR SERVICE: HCPCS | Performed by: INTERNAL MEDICINE

## 2020-05-18 PROCEDURE — 95714 VEEG EA 12-26 HR UNMNTR: CPT | Performed by: PSYCHIATRY & NEUROLOGY

## 2020-05-18 PROCEDURE — 95720 EEG PHY/QHP EA INCR W/VEEG: CPT | Performed by: PSYCHIATRY & NEUROLOGY

## 2020-05-18 PROCEDURE — 700101 HCHG RX REV CODE 250: Performed by: INTERNAL MEDICINE

## 2020-05-18 PROCEDURE — 700101 HCHG RX REV CODE 250: Performed by: PSYCHIATRY & NEUROLOGY

## 2020-05-18 PROCEDURE — 700102 HCHG RX REV CODE 250 W/ 637 OVERRIDE(OP): Performed by: INTERNAL MEDICINE

## 2020-05-18 PROCEDURE — 37799 UNLISTED PX VASCULAR SURGERY: CPT

## 2020-05-18 PROCEDURE — 84134 ASSAY OF PREALBUMIN: CPT

## 2020-05-18 PROCEDURE — 84100 ASSAY OF PHOSPHORUS: CPT

## 2020-05-18 PROCEDURE — 85025 COMPLETE CBC W/AUTO DIFF WBC: CPT

## 2020-05-18 PROCEDURE — 770022 HCHG ROOM/CARE - ICU (200)

## 2020-05-18 PROCEDURE — 83735 ASSAY OF MAGNESIUM: CPT

## 2020-05-18 PROCEDURE — 86140 C-REACTIVE PROTEIN: CPT

## 2020-05-18 PROCEDURE — 94003 VENT MGMT INPAT SUBQ DAY: CPT

## 2020-05-18 PROCEDURE — 700111 HCHG RX REV CODE 636 W/ 250 OVERRIDE (IP): Performed by: PSYCHIATRY & NEUROLOGY

## 2020-05-18 PROCEDURE — 700111 HCHG RX REV CODE 636 W/ 250 OVERRIDE (IP): Performed by: HOSPITALIST

## 2020-05-18 RX ORDER — KETAMINE HYDROCHLORIDE 50 MG/ML
180 INJECTION, SOLUTION INTRAMUSCULAR; INTRAVENOUS ONCE
Status: COMPLETED | OUTPATIENT
Start: 2020-05-18 | End: 2020-05-18

## 2020-05-18 RX ADMIN — VALPROATE SODIUM 750 MG: 100 INJECTION, SOLUTION INTRAVENOUS at 09:58

## 2020-05-18 RX ADMIN — VALPROATE SODIUM 750 MG: 100 INJECTION, SOLUTION INTRAVENOUS at 19:19

## 2020-05-18 RX ADMIN — PROPOFOL 20 MCG/KG/MIN: 10 INJECTION, EMULSION INTRAVENOUS at 10:34

## 2020-05-18 RX ADMIN — SODIUM CHLORIDE 2.5 MG/KG/HR: 9 INJECTION, SOLUTION INTRAVENOUS at 20:43

## 2020-05-18 RX ADMIN — DIBASIC SODIUM PHOSPHATE, MONOBASIC POTASSIUM PHOSPHATE AND MONOBASIC SODIUM PHOSPHATE 2 TABLET: 852; 155; 130 TABLET ORAL at 10:35

## 2020-05-18 RX ADMIN — TOPIRAMATE 200 MG: 100 TABLET, FILM COATED ORAL at 17:01

## 2020-05-18 RX ADMIN — FUROSEMIDE 40 MG: 10 INJECTION, SOLUTION INTRAMUSCULAR; INTRAVENOUS at 04:49

## 2020-05-18 RX ADMIN — LOSARTAN POTASSIUM 100 MG: 50 TABLET, FILM COATED ORAL at 17:01

## 2020-05-18 RX ADMIN — DEXAMETHASONE SODIUM PHOSPHATE 4 MG: 4 INJECTION, SOLUTION INTRA-ARTICULAR; INTRALESIONAL; INTRAMUSCULAR; INTRAVENOUS; SOFT TISSUE at 17:01

## 2020-05-18 RX ADMIN — TOPIRAMATE 200 MG: 100 TABLET, FILM COATED ORAL at 05:00

## 2020-05-18 RX ADMIN — SODIUM CHLORIDE 500 ML: 3 INJECTION, SOLUTION INTRAVENOUS at 00:31

## 2020-05-18 RX ADMIN — DEXAMETHASONE SODIUM PHOSPHATE 4 MG: 4 INJECTION, SOLUTION INTRA-ARTICULAR; INTRALESIONAL; INTRAMUSCULAR; INTRAVENOUS; SOFT TISSUE at 05:00

## 2020-05-18 RX ADMIN — AMPICILLIN SODIUM AND SULBACTAM SODIUM 3 G: 2; 1 INJECTION, POWDER, FOR SOLUTION INTRAMUSCULAR; INTRAVENOUS at 23:58

## 2020-05-18 RX ADMIN — ENOXAPARIN SODIUM 40 MG: 100 INJECTION SUBCUTANEOUS at 05:00

## 2020-05-18 RX ADMIN — SODIUM CHLORIDE 2.5 MG/KG/HR: 9 INJECTION, SOLUTION INTRAVENOUS at 18:02

## 2020-05-18 RX ADMIN — FAMOTIDINE 20 MG: 20 TABLET ORAL at 05:00

## 2020-05-18 RX ADMIN — POTASSIUM BICARBONATE 25 MEQ: 978 TABLET, EFFERVESCENT ORAL at 14:44

## 2020-05-18 RX ADMIN — AMPICILLIN SODIUM AND SULBACTAM SODIUM 3 G: 2; 1 INJECTION, POWDER, FOR SOLUTION INTRAMUSCULAR; INTRAVENOUS at 11:13

## 2020-05-18 RX ADMIN — SODIUM CHLORIDE 300 MG: 9 INJECTION, SOLUTION INTRAVENOUS at 08:39

## 2020-05-18 RX ADMIN — AMPICILLIN SODIUM AND SULBACTAM SODIUM 3 G: 2; 1 INJECTION, POWDER, FOR SOLUTION INTRAMUSCULAR; INTRAVENOUS at 05:00

## 2020-05-18 RX ADMIN — DOCUSATE SODIUM 50 MG AND SENNOSIDES 8.6 MG 2 TABLET: 8.6; 5 TABLET, FILM COATED ORAL at 17:52

## 2020-05-18 RX ADMIN — KETAMINE HYDROCHLORIDE 180 MG: 50 INJECTION INTRAMUSCULAR; INTRAVENOUS at 18:05

## 2020-05-18 RX ADMIN — LEVETIRACETAM 1500 MG: 100 INJECTION, SOLUTION, CONCENTRATE INTRAVENOUS at 18:22

## 2020-05-18 RX ADMIN — POTASSIUM BICARBONATE 25 MEQ: 978 TABLET, EFFERVESCENT ORAL at 10:35

## 2020-05-18 RX ADMIN — PROPOFOL 40 MCG/KG/MIN: 10 INJECTION, EMULSION INTRAVENOUS at 18:12

## 2020-05-18 RX ADMIN — PROPOFOL 35 MCG/KG/MIN: 10 INJECTION, EMULSION INTRAVENOUS at 03:30

## 2020-05-18 RX ADMIN — AMPICILLIN SODIUM AND SULBACTAM SODIUM 3 G: 2; 1 INJECTION, POWDER, FOR SOLUTION INTRAMUSCULAR; INTRAVENOUS at 17:00

## 2020-05-18 RX ADMIN — LEVETIRACETAM 1500 MG: 100 INJECTION, SOLUTION, CONCENTRATE INTRAVENOUS at 05:38

## 2020-05-18 RX ADMIN — SODIUM CHLORIDE 300 MG: 9 INJECTION, SOLUTION INTRAVENOUS at 20:36

## 2020-05-18 RX ADMIN — PROPOFOL 40 MCG/KG/MIN: 10 INJECTION, EMULSION INTRAVENOUS at 22:54

## 2020-05-18 RX ADMIN — FAMOTIDINE 20 MG: 20 TABLET ORAL at 17:00

## 2020-05-18 RX ADMIN — POTASSIUM BICARBONATE 25 MEQ: 978 TABLET, EFFERVESCENT ORAL at 17:00

## 2020-05-18 NOTE — PROGRESS NOTES
Critical Care Progress Note    Date of admission  5/13/2020    Chief Complaint  61yo male with hx of GBM s/p resection in 2/2020, hx of seizures now admitted for recurrent seizures. Pt is on keppra 750mg PO BID and low dose dexamethasone 0.25mg PO daily at home. He presented after having right sided convulsive movements and slurred speech.      Pt was started on topamax, and valproate, and vimpat since admission. Keppra dose was increased to 1500 Q12H. On dexamethasone 4 Q12H. Given ativan of total 6mg since admission. Pt is somnolent and given the quick escalation of his antiseizure meds to control his seizures, pt's transferred to ICU for further management.  Upon ICU arrival pt was obtunded       Hospital Course          Interval Problem Update  Reviewed last 24 hour events:  - No sz on EEG overnight   - Neuro: GCS 3   - HR: 60s   - SBP: 100-110s   - GI TF at goal   - UOP: 4.1L   - Novak: yes   - Tm: 38.2   - ABG: Reviewed   - CXR (personally reviewed and compared to prior)   - Propofol gtt    AEDs  Keppra  Vimpat  Topamax  Depacon    5/17    On propofol high dose, wean per continuous eeg  On mechanical ventilator  Lasix 40 mg daily started  MRI ? Recurrent lesions, 3 mm shift  Neurology spoke to Neurosurgery  to compare to prior   Tolerating wean per neuro regarding eeg on reassessment.    Review of Systems  Review of Systems   Unable to perform ROS: Intubated        Vital Signs for last 24 hours   Pulse:  [] 65  Resp:  [17-42] 19  BP: ()/(52-91) 110/60  SpO2:  [90 %-99 %] 98 %    Hemodynamic parameters for last 24 hours       Respiratory Information for the last 24 hours  Vent Mode: APVCMV  Rate (breaths/min): 16  Vt Target (mL): 440  PEEP/CPAP: 8  MAP: 9.8    Physical Exam   Physical Exam  Vitals signs and nursing note reviewed.   Constitutional:       General: He is not in acute distress.     Appearance: He is ill-appearing. He is not toxic-appearing or diaphoretic.   HENT:      Head:  Normocephalic and atraumatic.      Right Ear: External ear normal.      Left Ear: External ear normal.      Nose: No congestion or rhinorrhea.      Mouth/Throat:      Mouth: Mucous membranes are moist.      Pharynx: Oropharynx is clear. No oropharyngeal exudate or posterior oropharyngeal erythema.   Eyes:      General: No scleral icterus.        Right eye: No discharge.         Left eye: No discharge.      Conjunctiva/sclera: Conjunctivae normal.      Pupils: Pupils are equal, round, and reactive to light.   Neck:      Musculoskeletal: Normal range of motion. No neck rigidity or muscular tenderness.   Cardiovascular:      Rate and Rhythm: Normal rate and regular rhythm.      Pulses: Normal pulses.      Heart sounds: Normal heart sounds. No murmur.   Pulmonary:      Breath sounds: No stridor. No wheezing, rhonchi or rales.      Comments: On ventilator  Abdominal:      General: There is no distension.      Tenderness: There is no abdominal tenderness. There is no guarding.   Musculoskeletal:         General: No swelling, tenderness or deformity.      Right lower leg: No edema.      Left lower leg: No edema.   Lymphadenopathy:      Cervical: No cervical adenopathy.   Skin:     Coloration: Skin is not jaundiced or pale.      Findings: No bruising, erythema, lesion or rash.   Neurological:      Mental Status: He is alert.      Cranial Nerves: No cranial nerve deficit.      Comments: GCS 7t [id number], CN reflexes intact. W/d in all 4 ext         Medications  Current Facility-Administered Medications   Medication Dose Route Frequency Provider Last Rate Last Dose   • furosemide (LASIX) injection 40 mg  40 mg Intravenous Q DAY Colton Perez M.D.   40 mg at 05/18/20 0449   • enoxaparin (LOVENOX) inj 40 mg  40 mg Subcutaneous DAILY Colton Perez M.D.   40 mg at 05/18/20 0500   • ampicillin/sulbactam (UNASYN) 3 g in  mL IVPB  3 g Intravenous Q6HRS Colton Perez M.D.   Stopped at 05/18/20 0530   • 3% sodium chloride  (HYPERTONIC SALINE) 500mL infusion 500 mL  500 mL Intravenous Continuous Colton Perez M.D. 40 mL/hr at 05/18/20 0421     • Pharmacy Consult: Enteral tube insertion - review meds/change route/product selection  1 Each Other PHARMACY TO DOSE Colton Perez M.D.       • losartan (COZAAR) tablet 100 mg  100 mg Enteral Tube DAILY Colton Perez M.D.   100 mg at 05/17/20 1704   • topiramate (TOPAMAX) tablet 200 mg  200 mg Enteral Tube Q12HRS Colton Perez M.D.   200 mg at 05/18/20 0500   • acetaminophen (TYLENOL) tablet 650 mg  650 mg Enteral Tube Q6HRS PRN Colton Perez M.D.   650 mg at 05/17/20 2133   • ondansetron (ZOFRAN ODT) dispertab 4 mg  4 mg Enteral Tube Q4HRS PRN Colton Perez M.D.       • promethazine (PHENERGAN) tablet 12.5-25 mg  12.5-25 mg Enteral Tube Q4HRS PRN Colton Perez M.D.       • Pharmacy Consult Request ...Pain Management Review 1 Each  1 Each Other PHARMACY TO DOSE Colton Perez M.D.        And   • oxyCODONE immediate-release (ROXICODONE) tablet 2.5 mg  2.5 mg Enteral Tube Q3HRS PRN Colton Perez M.D.        And   • oxyCODONE immediate-release (ROXICODONE) tablet 5 mg  5 mg Enteral Tube Q3HRS PRN Colton Perez M.D.       • lacosamide (VIMPAT) 300 mg in  mL ivpb  300 mg Intravenous Q12HRS Jass Velazquez M.D.   Stopped at 05/17/20 2340   • dexamethasone (DECADRON) injection 4 mg  4 mg Intravenous Q12HRS Guido Pierre M.D.   4 mg at 05/18/20 0500   • Respiratory Therapy Consult   Nebulization Continuous RT Nikhil Erickson D.O.       • ipratropium-albuterol (DUONEB) nebulizer solution  3 mL Nebulization Q2HRS PRN (RT) Nikhil Erickson D.O.       • famotidine (PEPCID) tablet 20 mg  20 mg Enteral Tube Q12HRS BRADY Reddy.O.   20 mg at 05/18/20 0500    Or   • famotidine (PEPCID) injection 20 mg  20 mg Intravenous Q12HRS BRADY Reddy.O.   20 mg at 05/16/20 0500   • senna-docusate (PERICOLACE or SENOKOT S) 8.6-50 MG per tablet 2 Tab  2 Tab Enteral Tube BID Nikhil Erickson D.O.   Stopped at  05/18/20 0600    And   • polyethylene glycol/lytes (MIRALAX) PACKET 1 Packet  1 Packet Enteral Tube QDAY PRN Nikhil Erickson D.O.   1 Packet at 05/17/20 1105    And   • magnesium hydroxide (MILK OF MAGNESIA) suspension 30 mL  30 mL Enteral Tube QDAY PRN Nikhil Erickson D.O.        And   • bisacodyl (DULCOLAX) suppository 10 mg  10 mg Rectal QDAY PRN Nikhil Erickson D.O.       • MD Alert...ICU Electrolyte Replacement per Pharmacy   Other PHARMACY TO DOSE Nikhil Erickson D.O.       • lidocaine (XYLOCAINE) 1 % injection 1-2 mL  1-2 mL Tracheal Tube Q30 MIN PRN Nikhil Erickson D.O.       • propofol (DIPRIVAN) injection  0-80 mcg/kg/min Intravenous Continuous Nikhil Erickson D.O. 8.4 mL/hr at 05/18/20 0700 15 mcg/kg/min at 05/18/20 0700   • ondansetron (ZOFRAN) syringe/vial injection 4 mg  4 mg Intravenous Q4HRS PRN Igor Carlos M.D.       • promethazine (PHENERGAN) suppository 12.5-25 mg  12.5-25 mg Rectal Q4HRS PRN Igor Carlos M.D.       • prochlorperazine (COMPAZINE) injection 5-10 mg  5-10 mg Intravenous Q4HRS PRN Igor Carlos M.D.       • LORazepam (ATIVAN) injection 4 mg  4 mg Intravenous Q10 MIN PRN Igor Carlos M.D.   4 mg at 05/15/20 1545   • levETIRAcetam (KEPPRA) 1,500 mg in  mL IVPB  1,500 mg Intravenous Q12HRS Igor Carlos M.D.   Stopped at 05/18/20 0553   • hydrALAZINE (APRESOLINE) injection 10 mg  10 mg Intravenous Q6HRS PRN Guido Pierre M.D.       • valproate (DEPACON) 750 mg in  mL IVPB  750 mg Intravenous BID Jass Velazquez M.D.   Stopped at 05/17/20 2125       Fluids    Intake/Output Summary (Last 24 hours) at 5/18/2020 0752  Last data filed at 5/18/2020 0600  Gross per 24 hour   Intake 2331.56 ml   Output 4135 ml   Net -1803.44 ml       Laboratory  Recent Labs     05/16/20  0400 05/17/20  0516 05/18/20  0439   ISTATAPH 7.393* 7.427 7.405   ISTATAPCO2 33.6 30.7 33.4   ISTATAPO2 136* 88* 75   ISTATATCO2 21 21 22   [id number] 99 97 95   ISTATARTHCO3 20.5 20.2 21.0   ISTATARTBE -4 -3 -3    ISTATTEMP 96.9 F 97.9 F 98.8 F   ISTATFIO2 40 30 30   ISTATSPEC Arterial Arterial Arterial   ISTATAPHTC 7.407 7.433 7.404   [id number] 130* 86 76         Recent Labs     05/16/20 0353 05/17/20 0407 05/17/20  1555 05/17/20 2200 05/18/20  0330   SODIUM 137   < > 140   < > 145 146* 146*   POTASSIUM 3.7   < > 3.4*   < > 3.4* 3.1* 3.1*   CHLORIDE 104   < > 109   < > 109 111 113*   CO2 18*   < > 22   < > 23 21 21   BUN 20   < > 22   < > 26* 27* 29*   CREATININE 0.62   < > 0.78   < > 0.85 0.89 0.83   MAGNESIUM 2.0  --  1.8  --   --   --  2.0   PHOSPHORUS 3.8  --  3.6  --   --   --  2.3*   CALCIUM 9.0   < > 8.5   < > 8.6 8.1* 8.3*    < > = values in this interval not displayed.     Recent Labs     05/17/20 0407 05/17/20 1555 05/17/20 2200 05/18/20  0330   ALTSGPT  --   --   --   --  27   ASTSGOT  --   --   --   --  7*   ALKPHOSPHAT  --   --   --   --  35   TBILIRUBIN  --   --   --   --  0.2   PREALBUMIN 25.6  --   --   --   --    GLUCOSE 147*   < > 144* 205* 172*    < > = values in this interval not displayed.     Recent Labs     05/16/20 0353 05/17/20 0407 05/18/20  0330   WBC 12.8* 9.0 9.2   NEUTSPOLYS 87.40* 82.80* 83.80*   LYMPHOCYTES 5.60* 7.80* 7.10*   MONOCYTES 5.20 6.80 7.40   EOSINOPHILS 0.00 0.00 0.20   BASOPHILS 0.20 0.10 0.20   ASTSGOT  --   --  7*   ALTSGPT  --   --  27   ALKPHOSPHAT  --   --  35   TBILIRUBIN  --   --  0.2     Recent Labs     05/16/20 0353 05/17/20  0407 05/18/20  0330   RBC 4.52* 4.02* 4.09*   HEMOGLOBIN 14.1 12.7* 12.9*   HEMATOCRIT 42.9 38.5* 39.5*   PLATELETCT 183 164 146*       Imaging  X-Ray:  I have personally reviewed the images and compared with prior images.  EKG:  I have personally reviewed the images and compared with prior images.  CT:    Reviewed  MRI:   Reviewed    Assessment/Plan  * Status epilepticus (HCC)- (present on admission)  Assessment & Plan  Continue keppra, valproate, topamax and vimpat  Ween propofol gtt wean by 10 mcg/kg/min increments every 2 hours  once seizures controlled  continuous EEG while weaning     Acute respiratory failure with hypoxia (HCC)  Assessment & Plan  Intubated for airway protection given drip medication required for seizure control  sbt once seizures resolved and able to wean propofol  Extubate once appropriate  aspiration    GBM (glioblastoma multiforme) (HCC)- (present on admission)  Assessment & Plan  S/p resection in 2/2020 at [facility]  Pt is undergoing chemo/radiation  On dexamethasone 4mg IV Q12H    Hyperglycemia- (present on admission)  Assessment & Plan  ssi if necessary  On steroids    Leukocytosis- (present on admission)  Assessment & Plan  ? Aspiration  On antibiotics    Hypokalemia- (present on admission)  Assessment & Plan  Goal > 4    Hyponatremia  Assessment & Plan  Stopped hypertonic saline drip  Monitor goal 135-145    Essential hypertension- (present on admission)  Assessment & Plan  Keep sbp < 160    Called after EEG with recurrent seizures. Will load with ketamine and start ketamine gtt.    VTE:  Contraindicated  Ulcer: H2 Antagonist  Lines: Central Line  Ongoing indication addressed and Novak Catheter  Ongoing indication addressed    I have performed a physical exam and reviewed and updated ROS and Plan today (5/18/2020). In review of yesterday's note (5/17/2020), there are no changes except as documented above.     Discussed patient condition and risk of morbidity and/or mortality with Family, RN, RT, Pharmacy, Code status disscussed, Charge nurse / hot rounds and neurology     The patient remains critically ill.  He is on a mechanical ventilator.  He is requiring propofol drip with active titration. Hypertonic saline for sodium goal 140-150, adjusting per sequential sodium. Critical care time = 38 minutes in directly providing and coordinating critical care and extensive data review.  No time overlap and excludes procedures.

## 2020-05-18 NOTE — CARE PLAN
Ventilator Daily Summary    Vent Day # 3     Ventilator settings changed this shift: RR from 20 to 16 per MD     Weaning trials: none at this time (cont eeg)     Respiratory Procedures: none at this time     Plan: Continue current ventilator settings and wean mechanical ventilation as tolerated per physician orders.

## 2020-05-18 NOTE — PROGRESS NOTES
Hospital Neurology Progress Note:     Interval History: No acute events overnight.  Unable to obtain review of systems due to intubation.    Objective:   Vitals:    05/18/20 1026 05/18/20 1100 05/18/20 1200 05/18/20 1202   BP:  151/84  108/55   Pulse: 94 83 60    Resp: (!) 23 (!) 22 16    Temp:       TempSrc:   Bladder    SpO2: 99% 99% 97%    Weight:       Height:           Labs:     Lab Results   Component Value Date/Time    PROTHROMBTM 12.0 05/13/2020 10:36 PM    INR 0.87 05/13/2020 10:36 PM      Lab Results   Component Value Date/Time    WBC 9.2 05/18/2020 03:30 AM    RBC 4.09 (L) 05/18/2020 03:30 AM    HEMOGLOBIN 12.9 (L) 05/18/2020 03:30 AM    HEMATOCRIT 39.5 (L) 05/18/2020 03:30 AM    MCV 96.6 05/18/2020 03:30 AM    MCH 31.5 05/18/2020 03:30 AM    MCHC 32.7 (L) 05/18/2020 03:30 AM    MPV 10.6 05/18/2020 03:30 AM    NEUTSPOLYS 83.80 (H) 05/18/2020 03:30 AM    LYMPHOCYTES 7.10 (L) 05/18/2020 03:30 AM    MONOCYTES 7.40 05/18/2020 03:30 AM    EOSINOPHILS 0.20 05/18/2020 03:30 AM    BASOPHILS 0.20 05/18/2020 03:30 AM    ANISOCYTOSIS 1+ 05/13/2020 10:36 PM      Lab Results   Component Value Date/Time    SODIUM 146 (H) 05/18/2020 03:30 AM    POTASSIUM 3.1 (L) 05/18/2020 03:30 AM    CHLORIDE 113 (H) 05/18/2020 03:30 AM    CO2 21 05/18/2020 03:30 AM    GLUCOSE 172 (H) 05/18/2020 03:30 AM    BUN 29 (H) 05/18/2020 03:30 AM    CREATININE 0.83 05/18/2020 03:30 AM      Lab Results   Component Value Date/Time    TRIGLYCERIDE 554 (H) 05/17/2020 04:07 AM       Lab Results   Component Value Date/Time    ALKPHOSPHAT 35 05/18/2020 03:30 AM    ASTSGOT 7 (L) 05/18/2020 03:30 AM    ALTSGPT 27 05/18/2020 03:30 AM    TBILIRUBIN 0.2 05/18/2020 03:30 AM        Imaging/Testing:   MRI brain on 5/17/2020 reviewed in chart    EEG from 5/17/2020 through 5/18/2020 was discussed with the interpreting neurologist and no seizure activity was seen.    Physical Exam:     General: 60-year-old male intubated in no acute distress.  Cardio: Normal  S1/S2. No peripheral edema.   Pulm: CTAX2. No respiratory distress.   Skin: Warm, dry, no rashes or lesions   Psychiatric: Appropriate affect. No active psychosis.  HEENT: Atraumatic head, normal sclera and conjunctiva, moist oral mucosa. No lid lag.  Abdomen: Soft, non tender. No masses or hepatosplenomegaly.    Neurologic:  Mental Status: Sedated and minimally arousable.  Able to follow simple commands.  Unable to evaluate speech or language due to intubation.  Cranial Nerves: Pupils equally round reactive to light.  Extraocular movements appear intact.  Face is symmetric.  Motor: Normal muscle tone and bulk.  Moves the left side purposefully and follows commands however I was unable to appreciate this on the right.  Reflexes: Deferred  Coordination: Unable to assess  Sensation: Withdraws and grimaces to noxious stimulus on the left greater than right  Gait/Station: Deferred    Assessment/Plan:    Ahmet Escobedo is a 60 y.o. male with history of left parietal glioblastoma multiform a status post resection presenting to the hospital for worsening edema/seizures and consulted for seizures.  Etiology of seizures is secondary to glioblastoma.  Currently intubated for airway protection.  Currently the patient is on 4 appropriately dosed antiseizure medications plus propofol.  Will attempt to wean propofol and see if seizures recur.  At this time, addition of 1/5 antiseizure medication is unlikely to result in seizure freedom and if propofol weaning fails then would consider other anesthetics such as ketamine.    Plan:  1.  Continue video monitored EEG  2.  Vimpat 300 mg twice daily  3.  Keppra 1500 mg twice daily  4.  Continue Depakote 750 mg twice daily  5.  Continue Topamax 200 mg twice daily  6.  Plan to wean propofol.  If seizures recur, would use low-dose ketamine instead.  7.  Plan discussed with consulting physician and patient's nurse.    Te Verma M.D., Diplomat of the American Board of Psychiatry  and Neurology  Diplomat of [employer] Epilepsy Subspecialty   Assistant Clinical Professor, [facility] Neurology Consultant

## 2020-05-18 NOTE — PROCEDURES
1. Have you been to the ER, urgent care clinic since your last visit? Hospitalized since your last visit? Yes When: [address] ER     2. Have you seen or consulted any other health care providers outside of the [address] [place] [address] since your last visit? Include any pap smears or colon screening.  No  Reviewed record in preparation for visit and have necessary documentation  Pt did not bring medication to office visit for review  Goals that were addressed and/or need to be completed during or after this appointment include         Health Maintenance Due   Topic Date Due    Influenza Age 5 to Adult  08/01/2018 VIDEO ELECTROENCEPHALOGRAM REPORT        Referring provider: Dr. Velazquez.      DOS: 5/18/2020 (total recording of 23 hours and 49 minutes).      INDICATION:  Ahmet Escobedo 60 y.o. male presenting with h/o brain tumor, seizures.      CURRENT ANTIEPILEPTIC REGIMEN: Levetiracetam 1500 mg q 12 hrs, Lacosamide 300 mg q 12 hrs, Valproic Acid 750 mg q 12 hrs, and Topiramate 200 mg q 12 hrs. Sedation stopped, then resumed with Propofol and Ketamine.      TECHNIQUE: 30 channel video electroencephalogram (EEG) was performed in accordance with the international 10-20 system. The study was reviewed in bipolar and referential montages. The recording examined a mildly sedated patient with arousals.      DESCRIPTION OF THE RECORD:  Waxing and waning of the cerebral electrical activity. There is slowing in the left hemisphere. Continuous left posterior quadrant spikes/polyspikes and sharps noted throughout the study, exhibiting a left lateralized periodic pattern.  A few left posterior quadrant seizures were captured when off sedation, consisting of rhythmic sharps or high amplitude rhythmic sharply contoured delta activity. Seizures were not associated with any clear clinical changes. No further seizures since sedation resumed and Ketamine added.       ACTIVATION PROCEDURES:   Not performed.      EKG: sampling of the EKG recording demonstrated sinus rhythm.      EVENTS: None.      INTERPRETATION:  This is an abnormal video EEG recording in a mildly sedated patient. There is slowing in the left hemisphere. Continuous left posterior quadrant spikes/polyspikes and sharps noted throughout the study, exhibiting a left lateralized periodic pattern.  A few left posterior quadrant seizures were captured when off sedation. Seizures were not associated with any clear clinical changes. No further seizures since sedation resumed and Ketamine added.  The patient remains at risk for seizure recurrence. The findings suggest a large underlying  area of cortical irritability and structural abnormality. Clinical and radiological correlation is recommended.     Updates provided to Dr. Jad Wise MD   Epilepsy and Neurodiagnostics.   Clinical  of Neurology [facility].   Diplomate in Neurology, Epilepsy, and Electrodiagnostic Medicine.   Office: [phone]  Fax: [phone]

## 2020-05-18 NOTE — CARE PLAN
Problem: Communication  Goal: The ability to communicate needs accurately and effectively will improve  Outcome: NOT MET  Intervention: Reorient patient to environment as needed  Note: Patient frequently reoriented, Q2 hour neuro checks.     Problem: Venous Thromboembolism (VTW)/Deep Vein Thrombosis (DVT) Prevention:  Goal: Patient will participate in Venous Thrombosis (VTE)/Deep Vein Thrombosis (DVT)Prevention Measures  Intervention: Ensure patient wears graduated elastic stockings (MICHA hose) and/or SCDs, if ordered, when in bed or chair (Remove at least once per shift for skin check)  Note: SCDs applied to bilateral calves.     Problem: Skin Integrity  Goal: Risk for impaired skin integrity will decrease  Intervention: Implement precautions to protect skin integrity in collaboration with the interdisciplinary team  Note: Patient turned/repositioned Q2 hours, extremities resting on pillows, pulse ox and BP cuff rotated, ETT rotated, tubes/lines off of skin.

## 2020-05-19 ENCOUNTER — APPOINTMENT (OUTPATIENT)
Dept: RADIOLOGY | Facility: MEDICAL CENTER | Age: 60
DRG: 100 | End: 2020-05-19
Attending: INTERNAL MEDICINE
Payer: OTHER MISCELLANEOUS

## 2020-05-19 LAB
ACTION RANGE TRIGGERED IACRT: NO
ANION GAP SERPL CALC-SCNC: 10 MMOL/L (ref 7–16)
BACTERIA SPEC RESP CULT: NORMAL
BASE EXCESS BLDA CALC-SCNC: -1 MMOL/L (ref -4–3)
BASOPHILS # BLD AUTO: 0.9 % (ref 0–1.8)
BASOPHILS # BLD: 0.07 K/UL (ref 0–0.12)
BODY TEMPERATURE: ABNORMAL DEGREES
BUN SERPL-MCNC: 31 MG/DL (ref 8–22)
CALCIUM SERPL-MCNC: 7.9 MG/DL (ref 8.5–10.5)
CHLORIDE SERPL-SCNC: 114 MMOL/L (ref 96–112)
CO2 BLDA-SCNC: 24 MMOL/L (ref 20–33)
CO2 SERPL-SCNC: 20 MMOL/L (ref 20–33)
CREAT SERPL-MCNC: 0.68 MG/DL (ref 0.5–1.4)
EOSINOPHIL # BLD AUTO: 0 K/UL (ref 0–0.51)
EOSINOPHIL NFR BLD: 0 % (ref 0–6.9)
ERYTHROCYTE [DISTWIDTH] IN BLOOD BY AUTOMATED COUNT: 55.8 FL (ref 35.9–50)
GLUCOSE SERPL-MCNC: 147 MG/DL (ref 65–99)
GRAM STN SPEC: NORMAL
HCO3 BLDA-SCNC: 23.1 MMOL/L (ref 17–25)
HCT VFR BLD AUTO: 34.5 % (ref 42–52)
HGB BLD-MCNC: 11.3 G/DL (ref 14–18)
HOROWITZ INDEX BLDA+IHG-RTO: 263 MM[HG]
INST. QUALIFIED PATIENT IIQPT: YES
LYMPHOCYTES # BLD AUTO: 0.84 K/UL (ref 1–4.8)
LYMPHOCYTES NFR BLD: 10.4 % (ref 22–41)
MANUAL DIFF BLD: NORMAL
MCH RBC QN AUTO: 31.7 PG (ref 27–33)
MCHC RBC AUTO-ENTMCNC: 32.8 G/DL (ref 33.7–35.3)
MCV RBC AUTO: 96.9 FL (ref 81.4–97.8)
METAMYELOCYTES NFR BLD MANUAL: 2.6 %
MONOCYTES # BLD AUTO: 0.07 K/UL (ref 0–0.85)
MONOCYTES NFR BLD AUTO: 0.9 % (ref 0–13.4)
MORPHOLOGY BLD-IMP: NORMAL
NEUTROPHILS # BLD AUTO: 6.9 K/UL (ref 1.82–7.42)
NEUTROPHILS NFR BLD: 82.6 % (ref 44–72)
NEUTS BAND NFR BLD MANUAL: 2.6 % (ref 0–10)
NRBC # BLD AUTO: 0 K/UL
NRBC BLD-RTO: 0 /100 WBC
O2/TOTAL GAS SETTING VFR VENT: 30 %
PCO2 BLDA: 37.2 MMHG (ref 26–37)
PCO2 TEMP ADJ BLDA: 36.8 MMHG (ref 26–37)
PH BLDA: 7.4 [PH] (ref 7.4–7.5)
PH TEMP ADJ BLDA: 7.41 [PH] (ref 7.4–7.5)
PLATELET # BLD AUTO: 165 K/UL (ref 164–446)
PLATELET BLD QL SMEAR: NORMAL
PMV BLD AUTO: 10.9 FL (ref 9–12.9)
PO2 BLDA: 79 MMHG (ref 64–87)
PO2 TEMP ADJ BLDA: 78 MMHG (ref 64–87)
POTASSIUM SERPL-SCNC: 3.3 MMOL/L (ref 3.6–5.5)
RBC # BLD AUTO: 3.56 M/UL (ref 4.7–6.1)
RBC BLD AUTO: NORMAL
SAO2 % BLDA: 96 % (ref 93–99)
SIGNIFICANT IND 70042: NORMAL
SITE SITE: NORMAL
SODIUM SERPL-SCNC: 144 MMOL/L (ref 135–145)
SOURCE SOURCE: NORMAL
SPECIMEN DRAWN FROM PATIENT: ABNORMAL
WBC # BLD AUTO: 8.1 K/UL (ref 4.8–10.8)

## 2020-05-19 PROCEDURE — 700102 HCHG RX REV CODE 250 W/ 637 OVERRIDE(OP): Performed by: PSYCHIATRY & NEUROLOGY

## 2020-05-19 PROCEDURE — 700105 HCHG RX REV CODE 258: Performed by: PSYCHIATRY & NEUROLOGY

## 2020-05-19 PROCEDURE — 700111 HCHG RX REV CODE 636 W/ 250 OVERRIDE (IP): Performed by: PSYCHIATRY & NEUROLOGY

## 2020-05-19 PROCEDURE — 94003 VENT MGMT INPAT SUBQ DAY: CPT

## 2020-05-19 PROCEDURE — A9270 NON-COVERED ITEM OR SERVICE: HCPCS | Performed by: INTERNAL MEDICINE

## 2020-05-19 PROCEDURE — 700111 HCHG RX REV CODE 636 W/ 250 OVERRIDE (IP): Performed by: INTERNAL MEDICINE

## 2020-05-19 PROCEDURE — 700101 HCHG RX REV CODE 250: Performed by: PSYCHIATRY & NEUROLOGY

## 2020-05-19 PROCEDURE — 85027 COMPLETE CBC AUTOMATED: CPT

## 2020-05-19 PROCEDURE — C9254 INJECTION, LACOSAMIDE: HCPCS | Performed by: PSYCHIATRY & NEUROLOGY

## 2020-05-19 PROCEDURE — 700105 HCHG RX REV CODE 258: Performed by: INTERNAL MEDICINE

## 2020-05-19 PROCEDURE — 94760 N-INVAS EAR/PLS OXIMETRY 1: CPT

## 2020-05-19 PROCEDURE — 700102 HCHG RX REV CODE 250 W/ 637 OVERRIDE(OP): Performed by: INTERNAL MEDICINE

## 2020-05-19 PROCEDURE — 95720 EEG PHY/QHP EA INCR W/VEEG: CPT | Performed by: PSYCHIATRY & NEUROLOGY

## 2020-05-19 PROCEDURE — 95714 VEEG EA 12-26 HR UNMNTR: CPT | Performed by: PSYCHIATRY & NEUROLOGY

## 2020-05-19 PROCEDURE — 700111 HCHG RX REV CODE 636 W/ 250 OVERRIDE (IP): Performed by: HOSPITALIST

## 2020-05-19 PROCEDURE — 4A10X4Z MONITORING OF CENTRAL NERVOUS ELECTRICAL ACTIVITY, EXTERNAL APPROACH: ICD-10-PCS | Performed by: PSYCHIATRY & NEUROLOGY

## 2020-05-19 PROCEDURE — 99291 CRITICAL CARE FIRST HOUR: CPT | Performed by: PSYCHIATRY & NEUROLOGY

## 2020-05-19 PROCEDURE — 80048 BASIC METABOLIC PNL TOTAL CA: CPT

## 2020-05-19 PROCEDURE — 36600 WITHDRAWAL OF ARTERIAL BLOOD: CPT

## 2020-05-19 PROCEDURE — 700101 HCHG RX REV CODE 250: Performed by: INTERNAL MEDICINE

## 2020-05-19 PROCEDURE — 82803 BLOOD GASES ANY COMBINATION: CPT

## 2020-05-19 PROCEDURE — 71045 X-RAY EXAM CHEST 1 VIEW: CPT

## 2020-05-19 PROCEDURE — A9270 NON-COVERED ITEM OR SERVICE: HCPCS | Performed by: PSYCHIATRY & NEUROLOGY

## 2020-05-19 PROCEDURE — 700111 HCHG RX REV CODE 636 W/ 250 OVERRIDE (IP): Performed by: FAMILY MEDICINE

## 2020-05-19 PROCEDURE — 770022 HCHG ROOM/CARE - ICU (200)

## 2020-05-19 PROCEDURE — 700105 HCHG RX REV CODE 258: Performed by: HOSPITALIST

## 2020-05-19 PROCEDURE — 85007 BL SMEAR W/DIFF WBC COUNT: CPT

## 2020-05-19 RX ORDER — SCOLOPAMINE TRANSDERMAL SYSTEM 1 MG/1
1 PATCH, EXTENDED RELEASE TRANSDERMAL
Status: DISCONTINUED | OUTPATIENT
Start: 2020-05-19 | End: 2020-05-27

## 2020-05-19 RX ADMIN — POTASSIUM BICARBONATE 25 MEQ: 978 TABLET, EFFERVESCENT ORAL at 17:27

## 2020-05-19 RX ADMIN — SODIUM CHLORIDE 2.5 MG/KG/HR: 9 INJECTION, SOLUTION INTRAVENOUS at 06:38

## 2020-05-19 RX ADMIN — DEXAMETHASONE SODIUM PHOSPHATE 4 MG: 4 INJECTION, SOLUTION INTRA-ARTICULAR; INTRALESIONAL; INTRAMUSCULAR; INTRAVENOUS; SOFT TISSUE at 17:27

## 2020-05-19 RX ADMIN — TOPIRAMATE 200 MG: 100 TABLET, FILM COATED ORAL at 17:28

## 2020-05-19 RX ADMIN — AMPICILLIN SODIUM AND SULBACTAM SODIUM 3 G: 2; 1 INJECTION, POWDER, FOR SOLUTION INTRAMUSCULAR; INTRAVENOUS at 12:59

## 2020-05-19 RX ADMIN — SODIUM CHLORIDE 300 MG: 9 INJECTION, SOLUTION INTRAVENOUS at 22:38

## 2020-05-19 RX ADMIN — SODIUM CHLORIDE 2.5 MG/KG/HR: 9 INJECTION, SOLUTION INTRAVENOUS at 20:42

## 2020-05-19 RX ADMIN — PROPOFOL 40 MCG/KG/MIN: 10 INJECTION, EMULSION INTRAVENOUS at 06:38

## 2020-05-19 RX ADMIN — FAMOTIDINE 20 MG: 20 TABLET ORAL at 17:27

## 2020-05-19 RX ADMIN — LEVETIRACETAM 1500 MG: 100 INJECTION, SOLUTION, CONCENTRATE INTRAVENOUS at 06:11

## 2020-05-19 RX ADMIN — POTASSIUM BICARBONATE 25 MEQ: 978 TABLET, EFFERVESCENT ORAL at 09:41

## 2020-05-19 RX ADMIN — SODIUM CHLORIDE 2.5 MG/KG/HR: 9 INJECTION, SOLUTION INTRAVENOUS at 01:34

## 2020-05-19 RX ADMIN — ENOXAPARIN SODIUM 40 MG: 100 INJECTION SUBCUTANEOUS at 05:23

## 2020-05-19 RX ADMIN — PROPOFOL 40 MCG/KG/MIN: 10 INJECTION, EMULSION INTRAVENOUS at 11:00

## 2020-05-19 RX ADMIN — LEVETIRACETAM 1500 MG: 100 INJECTION, SOLUTION, CONCENTRATE INTRAVENOUS at 18:09

## 2020-05-19 RX ADMIN — SODIUM CHLORIDE 2.5 MG/KG/HR: 9 INJECTION, SOLUTION INTRAVENOUS at 16:00

## 2020-05-19 RX ADMIN — FAMOTIDINE 20 MG: 20 TABLET ORAL at 05:27

## 2020-05-19 RX ADMIN — DEXAMETHASONE SODIUM PHOSPHATE 4 MG: 4 INJECTION, SOLUTION INTRA-ARTICULAR; INTRALESIONAL; INTRAMUSCULAR; INTRAVENOUS; SOFT TISSUE at 05:26

## 2020-05-19 RX ADMIN — POTASSIUM BICARBONATE 25 MEQ: 978 TABLET, EFFERVESCENT ORAL at 14:41

## 2020-05-19 RX ADMIN — LOSARTAN POTASSIUM 100 MG: 50 TABLET, FILM COATED ORAL at 17:28

## 2020-05-19 RX ADMIN — VALPROATE SODIUM 750 MG: 100 INJECTION, SOLUTION INTRAVENOUS at 20:42

## 2020-05-19 RX ADMIN — AMPICILLIN SODIUM AND SULBACTAM SODIUM 3 G: 2; 1 INJECTION, POWDER, FOR SOLUTION INTRAMUSCULAR; INTRAVENOUS at 05:19

## 2020-05-19 RX ADMIN — SODIUM CHLORIDE 2.5 MG/KG/HR: 9 INJECTION, SOLUTION INTRAVENOUS at 11:23

## 2020-05-19 RX ADMIN — VALPROATE SODIUM 750 MG: 100 INJECTION, SOLUTION INTRAVENOUS at 09:41

## 2020-05-19 RX ADMIN — AMPICILLIN SODIUM AND SULBACTAM SODIUM 3 G: 2; 1 INJECTION, POWDER, FOR SOLUTION INTRAMUSCULAR; INTRAVENOUS at 17:27

## 2020-05-19 RX ADMIN — SODIUM CHLORIDE 300 MG: 9 INJECTION, SOLUTION INTRAVENOUS at 11:01

## 2020-05-19 RX ADMIN — PROPOFOL 40 MCG/KG/MIN: 10 INJECTION, EMULSION INTRAVENOUS at 03:19

## 2020-05-19 RX ADMIN — PROPOFOL 30 MCG/KG/MIN: 10 INJECTION, EMULSION INTRAVENOUS at 15:44

## 2020-05-19 RX ADMIN — SCOPALAMINE 1 PATCH: 1 PATCH, EXTENDED RELEASE TRANSDERMAL at 13:04

## 2020-05-19 RX ADMIN — FUROSEMIDE 40 MG: 10 INJECTION, SOLUTION INTRAMUSCULAR; INTRAVENOUS at 05:21

## 2020-05-19 ASSESSMENT — FIBROSIS 4 INDEX: FIB4 SCORE: 0.5

## 2020-05-19 NOTE — PROGRESS NOTES
Critical Care Progress Note    Date of admission  5/13/2020    Chief Complaint  61yo male with hx of GBM s/p resection in 2/2020, hx of seizures now admitted for recurrent seizures. Pt is on keppra 750mg PO BID and low dose dexamethasone 0.25mg PO daily at home. He presented after having right sided convulsive movements and slurred speech.      Pt was started on topamax, and valproate, and vimpat since admission. Keppra dose was increased to 1500 Q12H. On dexamethasone 4 Q12H. Given ativan of total 6mg since admission. Pt is somnolent and given the quick escalation of his antiseizure meds to control his seizures, pt's transferred to ICU for further management.  Upon ICU arrival pt was obtunded       Hospital Course          Interval Problem Update  Reviewed last 24 hour events:  - No sz on EEG overnight after ketamine started   - Neuro: GCS 3   - HR: 50-60s   - SBP: 110-120s   - GI TF at goal   - UOP: 3.4L   - Novak: yes   - Tm: 37.2   - ABG: Reviewed   - CXR (personally reviewed and compared to prior)   - Current titration of a ketamine gtt   - Replete K+    AEDs  Keppra  Vimpat  Topamax  Depacon      Review of Systems  Review of Systems   Unable to perform ROS: Intubated        Vital Signs for last 24 hours   Pulse:  [53-94] 53  Resp:  [16-32] 19  BP: (103-151)/(55-90) 121/64  SpO2:  [93 %-99 %] 97 %    Hemodynamic parameters for last 24 hours       Respiratory Information for the last 24 hours  Vent Mode: APVCMV  Rate (breaths/min): 16  Vt Target (mL): 440  PEEP/CPAP: 8  MAP: 9.3    Physical Exam   Physical Exam  Vitals signs and nursing note reviewed.   Constitutional:       General: He is not in acute distress.     Appearance: He is ill-appearing. He is not toxic-appearing or diaphoretic.   HENT:      Head: Normocephalic and atraumatic.      Right Ear: External ear normal.      Left Ear: External ear normal.      Nose: Nose normal.      Mouth/Throat:      Mouth: Mucous membranes are moist.      Pharynx:  Oropharynx is clear. No oropharyngeal exudate or posterior oropharyngeal erythema.   Eyes:      General: No scleral icterus.        Right eye: No discharge.         Left eye: No discharge.      Conjunctiva/sclera: Conjunctivae normal.      Pupils: Pupils are equal, round, and reactive to light.   Neck:      Musculoskeletal: Normal range of motion.   Cardiovascular:      Rate and Rhythm: Normal rate and regular rhythm.      Pulses: Normal pulses.      Heart sounds: Normal heart sounds. No murmur.   Pulmonary:      Breath sounds: No stridor. No wheezing, rhonchi or rales.      Comments: On ventilator  Abdominal:      General: There is no distension.      Tenderness: There is no abdominal tenderness.   Musculoskeletal:         General: No swelling or tenderness.      Right lower leg: No edema.      Left lower leg: No edema.   Skin:     Coloration: Skin is not jaundiced.      Findings: No rash.   Neurological:      Mental Status: He is alert.      Cranial Nerves: No cranial nerve deficit.      Comments: GCS 3t. Deep sedation. Pupils reactive         Medications  Current Facility-Administered Medications   Medication Dose Route Frequency Provider Last Rate Last Dose   • ketamine 1,000 mg in  mL infusion (critical care only)  0-2.5 mg/kg/hr Intravenous Continuous Nik Jones M.D. 117 mL/hr at 05/19/20 0700 2.5 mg/kg/hr at 05/19/20 0700   • furosemide (LASIX) injection 40 mg  40 mg Intravenous Q DAY Colton Perez M.D.   40 mg at 05/19/20 0521   • enoxaparin (LOVENOX) inj 40 mg  40 mg Subcutaneous DAILY Colton Perez M.D.   40 mg at 05/19/20 0523   • ampicillin/sulbactam (UNASYN) 3 g in  mL IVPB  3 g Intravenous Q6HRS Colton Perez M.D.   Stopped at 05/19/20 0549   • Pharmacy Consult: Enteral tube insertion - review meds/change route/product selection  1 Each Other PHARMACY TO DOSE Colton Perez M.D.       • losartan (COZAAR) tablet 100 mg  100 mg Enteral Tube DAILY Colton Perez M.D.   100 mg at  05/18/20 1701   • topiramate (TOPAMAX) tablet 200 mg  200 mg Enteral Tube Q12HRS Colton Perez M.D.   200 mg at 05/18/20 1701   • acetaminophen (TYLENOL) tablet 650 mg  650 mg Enteral Tube Q6HRS PRN Colton Perez M.D.   650 mg at 05/17/20 2133   • ondansetron (ZOFRAN ODT) dispertab 4 mg  4 mg Enteral Tube Q4HRS PRN Colton Perez M.D.       • promethazine (PHENERGAN) tablet 12.5-25 mg  12.5-25 mg Enteral Tube Q4HRS PRN Cotlon Perez M.D.       • Pharmacy Consult Request ...Pain Management Review 1 Each  1 Each Other PHARMACY TO DOSE Colton Perez M.D.        And   • oxyCODONE immediate-release (ROXICODONE) tablet 2.5 mg  2.5 mg Enteral Tube Q3HRS PRN Colton Perez M.D.        And   • oxyCODONE immediate-release (ROXICODONE) tablet 5 mg  5 mg Enteral Tube Q3HRS PRN Colton Perez M.D.       • lacosamide (VIMPAT) 300 mg in  mL ivpb  300 mg Intravenous Q12HRS Jass Velazquez M.D.   Stopped at 05/18/20 2136   • dexamethasone (DECADRON) injection 4 mg  4 mg Intravenous Q12HRS Guido Pierre M.D.   4 mg at 05/19/20 0526   • Respiratory Therapy Consult   Nebulization Continuous RT BRADY Reddy.[email or web address]       • ipratropium-albuterol (DUONEB) nebulizer solution  3 mL Nebulization Q2HRS PRN (RT) BRADY Reddy.O.       • famotidine (PEPCID) tablet 20 mg  20 mg Enteral Tube Q12HRS Nikhil Erickson D.O.   20 mg at 05/19/20 0527    Or   • famotidine (PEPCID) injection 20 mg  20 mg Intravenous Q12HRS Nikhil Erickson D.O.   20 mg at 05/16/20 0500   • senna-docusate (PERICOLACE or SENOKOT S) 8.6-50 MG per tablet 2 Tab  2 Tab Enteral Tube BID Nikhil Erickson D.O.   2 Tab at 05/18/20 1752    And   • polyethylene glycol/lytes (MIRALAX) PACKET 1 Packet  1 Packet Enteral Tube QDAY PRN Nikhil Erickson D.O.   1 Packet at 05/17/20 1105    And   • magnesium hydroxide (MILK OF MAGNESIA) suspension 30 mL  30 mL Enteral Tube QDAY PRN Nikhil Erickson D.O.        And   • bisacodyl (DULCOLAX) suppository 10 mg  10 mg Rectal QDAY PRN Nikhil Erickson D.O.       •  MD Alert...ICU Electrolyte Replacement per Pharmacy   Other PHARMACY TO DOSE Nikhil Erickson D.O.       • lidocaine (XYLOCAINE) 1 % injection 1-2 mL  1-2 mL Tracheal Tube Q30 MIN PRN Nikhil Erickson D.O.       • propofol (DIPRIVAN) injection  0-80 mcg/kg/min Intravenous Continuous Nikhil Erickson D.O. 22.5 mL/hr at 05/19/20 0638 40 mcg/kg/min at 05/19/20 0638   • ondansetron (ZOFRAN) syringe/vial injection 4 mg  4 mg Intravenous Q4HRS PRN Igor Carlos M.D.       • promethazine (PHENERGAN) suppository 12.5-25 mg  12.5-25 mg Rectal Q4HRS PRN Igor Carlos M.D.       • prochlorperazine (COMPAZINE) injection 5-10 mg  5-10 mg Intravenous Q4HRS PRN Igor Carlos M.D.       • LORazepam (ATIVAN) injection 4 mg  4 mg Intravenous Q10 MIN PRN Igor Carlos M.D.   4 mg at 05/15/20 1545   • levETIRAcetam (KEPPRA) 1,500 mg in  mL IVPB  1,500 mg Intravenous Q12HRS Igor Carlos M.D.   Stopped at 05/19/20 0626   • hydrALAZINE (APRESOLINE) injection 10 mg  10 mg Intravenous Q6HRS PRN Guido Pierre M.D.       • valproate (DEPACON) 750 mg in  mL IVPB  750 mg Intravenous BID Jass Velazquez M.D.   Stopped at 05/18/20 2019       Fluids    Intake/Output Summary (Last 24 hours) at 5/19/2020 0805  Last data filed at 5/19/2020 0600  Gross per 24 hour   Intake 3599.22 ml   Output 2535 ml   Net 1064.22 ml       Laboratory  Recent Labs     05/17/20  0516 05/18/20  0439 05/19/20  0438   ISTATAPH 7.427 7.405 7.402   ISTATAPCO2 30.7 33.4 37.2*   ISTATAPO2 88* 75 79   ISTATATCO2 21 22 24   [id number] 97 95 96   ISTATARTHCO3 20.2 21.0 23.1   ISTATARTBE -3 -3 -1   ISTATTEMP 97.9 F 98.8 F 98.2 F   ISTATFIO2 30 30 30   ISTATSPEC Arterial Arterial Arterial   ISTATAPHTC 7.433 7.404 7.405   [id number] 86 76 78         Recent Labs     05/17/20  0407  05/17/20  2200 05/18/20  0330 05/19/20  0330   SODIUM 140   < > 146* 146* 144   POTASSIUM 3.4*   < > 3.1* 3.1* 3.3*   CHLORIDE 109   < > 111 113* 114*   CO2 22   < > 21 21 20   BUN 22   < >  27* 29* 31*   CREATININE 0.78   < > 0.89 0.83 0.68   MAGNESIUM 1.8  --   --  2.0  --    PHOSPHORUS 3.6  --   --  2.3*  --    CALCIUM 8.5   < > 8.1* 8.3* 7.9*    < > = values in this interval not displayed.     Recent Labs     05/17/20 0407 05/17/20  2200 05/18/20  0330 05/18/20  1236 05/19/20 0330   ALTSGPT  --   --   --  27  --   --    ASTSGOT  --   --   --  7*  --   --    ALKPHOSPHAT  --   --   --  35  --   --    TBILIRUBIN  --   --   --  0.2  --   --    PREALBUMIN 25.6  --   --   --  23.9  --    GLUCOSE 147*   < > 205* 172*  --  147*    < > = values in this interval not displayed.     Recent Labs     05/17/20 0407 05/18/20 0330 05/19/20 0330   WBC 9.0 9.2 8.1   NEUTSPOLYS 82.80* 83.80* 82.60*   LYMPHOCYTES 7.80* 7.10* 10.40*   MONOCYTES 6.80 7.40 0.90   EOSINOPHILS 0.00 0.20 0.00   BASOPHILS 0.10 0.20 0.90   ASTSGOT  --  7*  --    ALTSGPT  --  27  --    ALKPHOSPHAT  --  35  --    TBILIRUBIN  --  0.2  --      Recent Labs     05/17/20 0407 05/18/20 0330 05/19/20  0330   RBC 4.02* 4.09* 3.56*   HEMOGLOBIN 12.7* 12.9* 11.3*   HEMATOCRIT 38.5* 39.5* 34.5*   PLATELETCT 164 146* 165       Imaging  X-Ray:  I have personally reviewed the images and compared with prior images.  EKG:  I have personally reviewed the images and compared with prior images.  CT:    Reviewed  MRI:   Reviewed    Assessment/Plan  * Status epilepticus (HCC)- (present on admission)  Assessment & Plan  Refractory  Continue keppra, valproate, topamax and vimpat  Started on Ketamine 5/19; loaded 2mg/kg, now with current titration of ketamine gtt  continuous EEG - no evidence of seizure overnight; will keep in suppressed state x 24hrs then begin wean    Acute respiratory failure with hypoxia (HCC)  Assessment & Plan  Intubated for airway protection given drip medication required for seizure control  sbt once seizures resolved and able to wean sedation  RT/O2 protocols  Daily and PRN ABGs  Titration of ventilator therapy based on ABGs and  patient's status  Sedation as tolerated/indicated  Daily CXR  HOB >30 degrees and peridex for VAP prevention  Pepcid for GI prophylaxis  SAT/SBT when able (ABCDEF Bundle)    GBM (glioblastoma multiforme) (HCC)- (present on admission)  Assessment & Plan  S/p resection in 2/2020 at [facility]  Pt is undergoing chemo/radiation  On dexamethasone 4mg IV Q12H    Hyperglycemia- (present on admission)  Assessment & Plan  ssi if necessary  On steroids    Leukocytosis- (present on admission)  Assessment & Plan  ? Aspiration  On antibiotics    Hypokalemia- (present on admission)  Assessment & Plan  Goal > 4    Hyponatremia  Assessment & Plan  Stopped hypertonic saline drip  Monitor goal 135-145    Essential hypertension- (present on admission)  Assessment & Plan  Keep sbp < 160    Called after EEG with recurrent seizures. Will load with ketamine and start ketamine gtt.    VTE:  Contraindicated  Ulcer: H2 Antagonist  Lines: Central Line  Ongoing indication addressed and Novak Catheter  Ongoing indication addressed    I have performed a physical exam and reviewed and updated ROS and Plan today (5/19/2020). In review of yesterday's note (5/18/2020), there are no changes except as documented above.     Discussed patient condition and risk of morbidity and/or mortality with Family, RN, RT, Pharmacy, Code status disscussed, Charge nurse / hot rounds and neurology     The patient remains critically ill.  He is on a mechanical ventilator.  He is requiring a propofol and ketamine drips with active titration.  Critical care time = 34 minutes in directly providing and coordinating critical care and extensive data review.  No time overlap and excludes procedures.

## 2020-05-19 NOTE — PROGRESS NOTES
Assumed care. Lines/gtts verified during bedside report. Fall/aspiration/seizure precautions in place. No distress. All needs met.

## 2020-05-19 NOTE — CARE PLAN
Problem: Safety  Goal: Will remain free from injury  Outcome: PROGRESSING AS EXPECTED     Problem: Knowledge Deficit  Goal: Knowledge of disease process/condition, treatment plan, diagnostic tests, and medications will improve  Outcome: PROGRESSING AS EXPECTED  Note: Discussed poc w/ daughter and S/O this am. All questions/concerns addressed.     Problem: Fluid Volume:  Goal: Will maintain balanced intake and output  Outcome: PROGRESSING SLOWER THAN EXPECTED  Note: Pt receiving lasix per MAR. Still edematous in all extremities. MD aware. Monitoring I/O.

## 2020-05-19 NOTE — PROCEDURES
VIDEO ELECTROENCEPHALOGRAM REPORT        Referring provider: Dr. Velazquez.      DOS: 5/19/2020 (total recording of 23 hours and 49 minutes).      INDICATION:  Ahmet Escobedo 60 y.o. male presenting with h/o brain tumor, seizures.      CURRENT ANTIEPILEPTIC REGIMEN: Levetiracetam 1500 mg q 12 hrs, Lacosamide 300 mg q 12 hrs, Valproic Acid 750 mg q 12 hrs, and Topiramate 200 mg q 12 hrs. Sedation with Propofol and Ketamine.      TECHNIQUE: 30 channel video electroencephalogram (EEG) was performed in accordance with the international 10-20 system. The study was reviewed in bipolar and referential montages. The recording examined a mildly sedated patient.      DESCRIPTION OF THE RECORD:  Waxing and waning of the cerebral electrical activity. There is slowing in the left hemisphere. Frequent left posterior quadrant spikes/polyspikes and sharps noted, rarely may exhibit a brief left lateralized periodic pattern.  No seizures captured.      ACTIVATION PROCEDURES:   Not performed.      EKG: sampling of the EKG recording demonstrated sinus rhythm.      EVENTS: None.      INTERPRETATION:  This is an abnormal video EEG recording in a sedated patient. There is slowing in the left hemisphere. Frequent left posterior quadrant spikes/polyspikes and sharps noted, rarely may exhibit a brief left lateralized periodic pattern.  No seizures captured. The patient remains at risk for seizure recurrence. The findings suggest a large underlying area of cortical irritability and structural abnormality. Clinical and radiological correlation is recommended.     Updates provided to Dr. Jad Wise MD   Epilepsy and Neurodiagnostics.   Clinical  of Neurology [facility].   Diplomate in Neurology, Epilepsy, and Electrodiagnostic Medicine.   Office: [phone]  Fax: [phone]

## 2020-05-20 ENCOUNTER — APPOINTMENT (OUTPATIENT)
Dept: RADIOLOGY | Facility: MEDICAL CENTER | Age: 60
DRG: 100 | End: 2020-05-20
Attending: INTERNAL MEDICINE
Payer: OTHER MISCELLANEOUS

## 2020-05-20 LAB
ACTION RANGE TRIGGERED IACRT: NO
ANION GAP SERPL CALC-SCNC: 9 MMOL/L (ref 7–16)
BASE EXCESS BLDA CALC-SCNC: -2 MMOL/L (ref -4–3)
BASOPHILS # BLD AUTO: 0 % (ref 0–1.8)
BASOPHILS # BLD: 0 K/UL (ref 0–0.12)
BODY TEMPERATURE: ABNORMAL DEGREES
BUN SERPL-MCNC: 34 MG/DL (ref 8–22)
CALCIUM SERPL-MCNC: 8.6 MG/DL (ref 8.5–10.5)
CHLORIDE SERPL-SCNC: 117 MMOL/L (ref 96–112)
CO2 BLDA-SCNC: 24 MMOL/L (ref 20–33)
CO2 SERPL-SCNC: 23 MMOL/L (ref 20–33)
CREAT SERPL-MCNC: 0.79 MG/DL (ref 0.5–1.4)
EOSINOPHIL # BLD AUTO: 0.15 K/UL (ref 0–0.51)
EOSINOPHIL NFR BLD: 1.7 % (ref 0–6.9)
ERYTHROCYTE [DISTWIDTH] IN BLOOD BY AUTOMATED COUNT: 56.1 FL (ref 35.9–50)
GLUCOSE SERPL-MCNC: 151 MG/DL (ref 65–99)
HCO3 BLDA-SCNC: 22.9 MMOL/L (ref 17–25)
HCT VFR BLD AUTO: 37.3 % (ref 42–52)
HGB BLD-MCNC: 11.8 G/DL (ref 14–18)
HOROWITZ INDEX BLDA+IHG-RTO: 280 MM[HG]
INST. QUALIFIED PATIENT IIQPT: YES
LYMPHOCYTES # BLD AUTO: 0.7 K/UL (ref 1–4.8)
LYMPHOCYTES NFR BLD: 7.8 % (ref 22–41)
MANUAL DIFF BLD: NORMAL
MCH RBC QN AUTO: 31.1 PG (ref 27–33)
MCHC RBC AUTO-ENTMCNC: 31.6 G/DL (ref 33.7–35.3)
MCV RBC AUTO: 98.4 FL (ref 81.4–97.8)
MONOCYTES # BLD AUTO: 0.32 K/UL (ref 0–0.85)
MONOCYTES NFR BLD AUTO: 3.5 % (ref 0–13.4)
MORPHOLOGY BLD-IMP: NORMAL
MYELOCYTES NFR BLD MANUAL: 0.9 %
NEUTROPHILS # BLD AUTO: 7.75 K/UL (ref 1.82–7.42)
NEUTROPHILS NFR BLD: 86.1 % (ref 44–72)
NRBC # BLD AUTO: 0 K/UL
NRBC BLD-RTO: 0 /100 WBC
O2/TOTAL GAS SETTING VFR VENT: 30 %
PCO2 BLDA: 37.7 MMHG (ref 26–37)
PCO2 TEMP ADJ BLDA: 37.3 MMHG (ref 26–37)
PH BLDA: 7.39 [PH] (ref 7.4–7.5)
PH TEMP ADJ BLDA: 7.39 [PH] (ref 7.4–7.5)
PLATELET # BLD AUTO: 158 K/UL (ref 164–446)
PMV BLD AUTO: 10.7 FL (ref 9–12.9)
PO2 BLDA: 84 MMHG (ref 64–87)
PO2 TEMP ADJ BLDA: 83 MMHG (ref 64–87)
POLYCHROMASIA BLD QL SMEAR: NORMAL
POTASSIUM SERPL-SCNC: 3.8 MMOL/L (ref 3.6–5.5)
RBC # BLD AUTO: 3.79 M/UL (ref 4.7–6.1)
RBC BLD AUTO: PRESENT
SAO2 % BLDA: 96 % (ref 93–99)
SODIUM SERPL-SCNC: 149 MMOL/L (ref 135–145)
SPECIMEN DRAWN FROM PATIENT: ABNORMAL
TRIGL SERPL-MCNC: 480 MG/DL (ref 0–149)
WBC # BLD AUTO: 9 K/UL (ref 4.8–10.8)

## 2020-05-20 PROCEDURE — 700105 HCHG RX REV CODE 258: Performed by: PSYCHIATRY & NEUROLOGY

## 2020-05-20 PROCEDURE — 4A10X4Z MONITORING OF CENTRAL NERVOUS ELECTRICAL ACTIVITY, EXTERNAL APPROACH: ICD-10-PCS | Performed by: PSYCHIATRY & NEUROLOGY

## 2020-05-20 PROCEDURE — 770022 HCHG ROOM/CARE - ICU (200)

## 2020-05-20 PROCEDURE — 80048 BASIC METABOLIC PNL TOTAL CA: CPT

## 2020-05-20 PROCEDURE — 95720 EEG PHY/QHP EA INCR W/VEEG: CPT | Performed by: PSYCHIATRY & NEUROLOGY

## 2020-05-20 PROCEDURE — 84478 ASSAY OF TRIGLYCERIDES: CPT

## 2020-05-20 PROCEDURE — 700105 HCHG RX REV CODE 258: Performed by: HOSPITALIST

## 2020-05-20 PROCEDURE — 700102 HCHG RX REV CODE 250 W/ 637 OVERRIDE(OP): Performed by: PSYCHIATRY & NEUROLOGY

## 2020-05-20 PROCEDURE — 85007 BL SMEAR W/DIFF WBC COUNT: CPT

## 2020-05-20 PROCEDURE — 700111 HCHG RX REV CODE 636 W/ 250 OVERRIDE (IP): Performed by: FAMILY MEDICINE

## 2020-05-20 PROCEDURE — 82803 BLOOD GASES ANY COMBINATION: CPT

## 2020-05-20 PROCEDURE — A9270 NON-COVERED ITEM OR SERVICE: HCPCS | Performed by: PSYCHIATRY & NEUROLOGY

## 2020-05-20 PROCEDURE — 700111 HCHG RX REV CODE 636 W/ 250 OVERRIDE (IP): Performed by: HOSPITALIST

## 2020-05-20 PROCEDURE — A9270 NON-COVERED ITEM OR SERVICE: HCPCS | Performed by: INTERNAL MEDICINE

## 2020-05-20 PROCEDURE — 700102 HCHG RX REV CODE 250 W/ 637 OVERRIDE(OP): Performed by: INTERNAL MEDICINE

## 2020-05-20 PROCEDURE — 71045 X-RAY EXAM CHEST 1 VIEW: CPT

## 2020-05-20 PROCEDURE — 94003 VENT MGMT INPAT SUBQ DAY: CPT

## 2020-05-20 PROCEDURE — 700111 HCHG RX REV CODE 636 W/ 250 OVERRIDE (IP): Performed by: INTERNAL MEDICINE

## 2020-05-20 PROCEDURE — 99291 CRITICAL CARE FIRST HOUR: CPT | Performed by: PSYCHIATRY & NEUROLOGY

## 2020-05-20 PROCEDURE — 36600 WITHDRAWAL OF ARTERIAL BLOOD: CPT

## 2020-05-20 PROCEDURE — 95714 VEEG EA 12-26 HR UNMNTR: CPT | Performed by: PSYCHIATRY & NEUROLOGY

## 2020-05-20 PROCEDURE — 700111 HCHG RX REV CODE 636 W/ 250 OVERRIDE (IP): Performed by: PSYCHIATRY & NEUROLOGY

## 2020-05-20 PROCEDURE — 700101 HCHG RX REV CODE 250: Performed by: INTERNAL MEDICINE

## 2020-05-20 PROCEDURE — 700101 HCHG RX REV CODE 250: Performed by: PSYCHIATRY & NEUROLOGY

## 2020-05-20 PROCEDURE — C9254 INJECTION, LACOSAMIDE: HCPCS | Performed by: PSYCHIATRY & NEUROLOGY

## 2020-05-20 PROCEDURE — 700105 HCHG RX REV CODE 258: Performed by: INTERNAL MEDICINE

## 2020-05-20 PROCEDURE — 85027 COMPLETE CBC AUTOMATED: CPT

## 2020-05-20 RX ADMIN — LEVETIRACETAM 1500 MG: 100 INJECTION, SOLUTION, CONCENTRATE INTRAVENOUS at 17:44

## 2020-05-20 RX ADMIN — AMPICILLIN SODIUM AND SULBACTAM SODIUM 3 G: 2; 1 INJECTION, POWDER, FOR SOLUTION INTRAMUSCULAR; INTRAVENOUS at 11:54

## 2020-05-20 RX ADMIN — LOSARTAN POTASSIUM 100 MG: 50 TABLET, FILM COATED ORAL at 17:44

## 2020-05-20 RX ADMIN — POTASSIUM BICARBONATE 25 MEQ: 978 TABLET, EFFERVESCENT ORAL at 11:14

## 2020-05-20 RX ADMIN — SODIUM CHLORIDE 2.5 MG/KG/HR: 9 INJECTION, SOLUTION INTRAVENOUS at 01:45

## 2020-05-20 RX ADMIN — TOPIRAMATE 200 MG: 100 TABLET, FILM COATED ORAL at 05:13

## 2020-05-20 RX ADMIN — AMPICILLIN SODIUM AND SULBACTAM SODIUM 3 G: 2; 1 INJECTION, POWDER, FOR SOLUTION INTRAMUSCULAR; INTRAVENOUS at 00:18

## 2020-05-20 RX ADMIN — DOCUSATE SODIUM 50 MG AND SENNOSIDES 8.6 MG 2 TABLET: 8.6; 5 TABLET, FILM COATED ORAL at 17:44

## 2020-05-20 RX ADMIN — SODIUM CHLORIDE 300 MG: 9 INJECTION, SOLUTION INTRAVENOUS at 21:47

## 2020-05-20 RX ADMIN — VALPROATE SODIUM 750 MG: 100 INJECTION, SOLUTION INTRAVENOUS at 19:53

## 2020-05-20 RX ADMIN — SODIUM CHLORIDE 300 MG: 9 INJECTION, SOLUTION INTRAVENOUS at 09:19

## 2020-05-20 RX ADMIN — SODIUM CHLORIDE 2.5 MG/KG/HR: 9 INJECTION, SOLUTION INTRAVENOUS at 06:38

## 2020-05-20 RX ADMIN — DOCUSATE SODIUM 50 MG AND SENNOSIDES 8.6 MG 2 TABLET: 8.6; 5 TABLET, FILM COATED ORAL at 05:12

## 2020-05-20 RX ADMIN — VALPROATE SODIUM 750 MG: 100 INJECTION, SOLUTION INTRAVENOUS at 10:21

## 2020-05-20 RX ADMIN — PROPOFOL 25 MCG/KG/MIN: 10 INJECTION, EMULSION INTRAVENOUS at 05:43

## 2020-05-20 RX ADMIN — ENOXAPARIN SODIUM 40 MG: 100 INJECTION SUBCUTANEOUS at 05:13

## 2020-05-20 RX ADMIN — HYDRALAZINE HYDROCHLORIDE 10 MG: 20 INJECTION INTRAMUSCULAR; INTRAVENOUS at 11:12

## 2020-05-20 RX ADMIN — PROPOFOL 20 MCG/KG/MIN: 10 INJECTION, EMULSION INTRAVENOUS at 00:15

## 2020-05-20 RX ADMIN — LEVETIRACETAM 1500 MG: 100 INJECTION, SOLUTION, CONCENTRATE INTRAVENOUS at 05:12

## 2020-05-20 RX ADMIN — AMPICILLIN SODIUM AND SULBACTAM SODIUM 3 G: 2; 1 INJECTION, POWDER, FOR SOLUTION INTRAMUSCULAR; INTRAVENOUS at 05:43

## 2020-05-20 RX ADMIN — FAMOTIDINE 20 MG: 20 TABLET ORAL at 05:12

## 2020-05-20 RX ADMIN — DEXAMETHASONE SODIUM PHOSPHATE 4 MG: 4 INJECTION, SOLUTION INTRA-ARTICULAR; INTRALESIONAL; INTRAMUSCULAR; INTRAVENOUS; SOFT TISSUE at 05:12

## 2020-05-20 RX ADMIN — ACETAMINOPHEN 650 MG: 325 TABLET, FILM COATED ORAL at 19:37

## 2020-05-20 RX ADMIN — FUROSEMIDE 40 MG: 10 INJECTION, SOLUTION INTRAMUSCULAR; INTRAVENOUS at 05:12

## 2020-05-20 RX ADMIN — SODIUM CHLORIDE 1 MG/KG/HR: 9 INJECTION, SOLUTION INTRAVENOUS at 14:00

## 2020-05-20 RX ADMIN — DEXAMETHASONE SODIUM PHOSPHATE 4 MG: 4 INJECTION, SOLUTION INTRA-ARTICULAR; INTRALESIONAL; INTRAMUSCULAR; INTRAVENOUS; SOFT TISSUE at 17:44

## 2020-05-20 RX ADMIN — AMPICILLIN SODIUM AND SULBACTAM SODIUM 3 G: 2; 1 INJECTION, POWDER, FOR SOLUTION INTRAMUSCULAR; INTRAVENOUS at 17:44

## 2020-05-20 RX ADMIN — TOPIRAMATE 200 MG: 100 TABLET, FILM COATED ORAL at 17:43

## 2020-05-20 RX ADMIN — Medication 50 MCG: at 11:47

## 2020-05-20 RX ADMIN — FAMOTIDINE 20 MG: 20 TABLET ORAL at 17:44

## 2020-05-20 NOTE — CARE PLAN
Problem: Safety  Goal: Will remain free from falls  Outcome: PROGRESSING AS EXPECTED  Bed in low and locked position. Bed alarm on. Side rails up x3.      Problem: Skin Integrity  Goal: Risk for impaired skin integrity will decrease  Outcome: PROGRESSING AS EXPECTED  Pillows in use for support and to float heels. Q2 turns in place. Q2 rotating of monitoring devices.       Problem: Pain Management  Goal: Pain level will decrease to patient's comfort goal  Outcome: PROGRESSING AS EXPECTED  Assessing pain using CPOT and medicating per MAR.

## 2020-05-20 NOTE — PROCEDURES
VIDEO ELECTROENCEPHALOGRAM REPORT        Referring provider: Dr. Velazquez.      DOS: 5/20/2020 (total recording of 23 hours and 36 minutes).      INDICATION:  Ahmet Escobedo 60 y.o. male presenting with h/o brain tumor, seizures.      CURRENT ANTIEPILEPTIC REGIMEN: Levetiracetam 1500 mg q 12 hrs, Lacosamide 300 mg q 12 hrs, Valproic Acid 750 mg q 12 hrs, and Topiramate 200 mg q 12 hrs. Sedation with Ketamine. Propofol was weaned off.      TECHNIQUE: 30 channel video electroencephalogram (EEG) was performed in accordance with the international 10-20 system. The study was reviewed in bipolar and referential montages. The recording examined a mildly sedated patient.      DESCRIPTION OF THE RECORD:  Waxing and waning of the cerebral electrical activity. There is slowing in the left hemisphere. Frequent left posterior quadrant spikes/polyspikes and sharps noted. Towards the last several hours of the recording, the patient appears less sedated, and brief runs of lateralized periodic discharges are noted. No seizures captured.      ACTIVATION PROCEDURES:   Not performed.      EKG: sampling of the EKG recording demonstrated sinus rhythm.      EVENTS: None.      INTERPRETATION:  This is an abnormal video EEG recording in a sedated patient. There is slowing in the left hemisphere. Frequent left posterior quadrant spikes/polyspikes and sharps noted. Towards the last several hours of the recording, the patient appears less sedated, and brief runs of left lateralized periodic discharges are noted. No seizures captured.  The patient remains at risk for seizure recurrence. The findings suggest a large underlying area of cortical irritability and structural abnormality. Clinical and radiological correlation is recommended.     Updates provided to Dr. Jad Wise MD   Epilepsy and Neurodiagnostics.   Clinical  of Neurology [facility].   Diplomate in  Neurology, Epilepsy, and Electrodiagnostic Medicine.   Office: [phone]  Fax: [phone]

## 2020-05-20 NOTE — PROGRESS NOTES
Critical Care Progress Note    Date of admission  5/13/2020    Chief Complaint  61yo male with hx of GBM s/p resection in 2/2020, hx of seizures now admitted for recurrent seizures. Pt is on keppra 750mg PO BID and low dose dexamethasone 0.25mg PO daily at home. He presented after having right sided convulsive movements and slurred speech.      Pt was started on topamax, and valproate, and vimpat since admission. Keppra dose was increased to 1500 Q12H. On dexamethasone 4 Q12H. Given ativan of total 6mg since admission. Pt is somnolent and given the quick escalation of his antiseizure meds to control his seizures, pt's transferred to ICU for further management.  Upon ICU arrival pt was obtunded       Hospital Course          Interval Problem Update  Reviewed last 24 hour events:  - No sz on EEG overnight after ketamine started   - Neuro: GCS 3   - HR: 40-60s   - SBP: 90-140s   - GI TF at goal   - UOP: 3.4L   - Novak: yes   - Tm: 37.3   - ABG: Reviewed   - CXR (personally reviewed and compared to prior)   - Current titration of a ketamine gtt   - Replete K+   - wean ketamine now at 1    AEDs  Keppra  Vimpat  Topamax  Depacon      Review of Systems  Review of Systems   Unable to perform ROS: Intubated        Vital Signs for last 24 hours   Temp:  [36.7 °C (98 °F)] 36.7 °C (98 °F)  Pulse:  [46-69] 46  Resp:  [16-27] 21  BP: ()/(60-87) 97/60  SpO2:  [93 %-100 %] 97 %    Hemodynamic parameters for last 24 hours       Respiratory Information for the last 24 hours  Vent Mode: APVCMV  Rate (breaths/min): 16  Vt Target (mL): 440  PEEP/CPAP: 8  MAP: 10  Control VTE (exp VT): 423    Physical Exam   Physical Exam  Vitals signs and nursing note reviewed.   Constitutional:       General: He is not in acute distress.     Appearance: He is ill-appearing. He is not toxic-appearing or diaphoretic.   HENT:      Head: Normocephalic and atraumatic.      Right Ear: External ear normal.      Left Ear: External ear normal.      Nose:  Nose normal.      Mouth/Throat:      Mouth: Mucous membranes are moist.      Pharynx: Oropharynx is clear. No oropharyngeal exudate or posterior oropharyngeal erythema.   Eyes:      General: No scleral icterus.        Right eye: No discharge.         Left eye: No discharge.      Conjunctiva/sclera: Conjunctivae normal.      Pupils: Pupils are equal, round, and reactive to light.   Neck:      Musculoskeletal: Normal range of motion.   Cardiovascular:      Rate and Rhythm: Normal rate and regular rhythm.      Pulses: Normal pulses.      Heart sounds: Normal heart sounds. No murmur.   Pulmonary:      Breath sounds: No stridor. No wheezing, rhonchi or rales.      Comments: On ventilator  Abdominal:      General: There is no distension.      Tenderness: There is no abdominal tenderness.   Musculoskeletal:         General: No swelling or tenderness.      Right lower leg: No edema.      Left lower leg: No edema.   Skin:     Coloration: Skin is not jaundiced.      Findings: No rash.   Neurological:      Mental Status: He is alert.      Cranial Nerves: No cranial nerve deficit.      Comments: GCS 3t. Deep sedation. Pupils reactive         Medications  Current Facility-Administered Medications   Medication Dose Route Frequency Provider Last Rate Last Dose   • scopolamine (TRANSDERM-SCOP) patch 1 Patch  1 Patch Transdermal Q72HRS Nik Jones M.D.   1 Patch at 05/19/20 1304   • ketamine 1,000 mg in  mL infusion (critical care only)  0-2.5 mg/kg/hr Intravenous Continuous Nik Jones M.D. 117 mL/hr at 05/20/20 0710 2.5 mg/kg/hr at 05/20/20 0710   • furosemide (LASIX) injection 40 mg  40 mg Intravenous Q DAY Colton Perez M.D.   40 mg at 05/20/20 0512   • enoxaparin (LOVENOX) inj 40 mg  40 mg Subcutaneous DAILY Colton Perez M.D.   40 mg at 05/20/20 0513   • ampicillin/sulbactam (UNASYN) 3 g in  mL IVPB  3 g Intravenous Q6HRS Colton Perez M.D.   Stopped at 05/20/20 0613   • Pharmacy Consult: Enteral tube  insertion - review meds/change route/product selection  1 Each Other PHARMACY TO DOSE Colton Perez M.D.       • losartan (COZAAR) tablet 100 mg  100 mg Enteral Tube DAILY Colton Perez M.D.   100 mg at 05/19/20 1728   • topiramate (TOPAMAX) tablet 200 mg  200 mg Enteral Tube Q12HRS Colton Perez M.D.   200 mg at 05/20/20 0513   • acetaminophen (TYLENOL) tablet 650 mg  650 mg Enteral Tube Q6HRS PRN Colton Perez M.D.   650 mg at 05/17/20 2133   • ondansetron (ZOFRAN ODT) dispertab 4 mg  4 mg Enteral Tube Q4HRS PRN Colton Perez M.D.       • promethazine (PHENERGAN) tablet 12.5-25 mg  12.5-25 mg Enteral Tube Q4HRS PRN Colton Perez M.D.       • Pharmacy Consult Request ...Pain Management Review 1 Each  1 Each Other PHARMACY TO DOSE Colton Perez M.D.        And   • oxyCODONE immediate-release (ROXICODONE) tablet 2.5 mg  2.5 mg Enteral Tube Q3HRS PRN Colton Perez M.D.        And   • oxyCODONE immediate-release (ROXICODONE) tablet 5 mg  5 mg Enteral Tube Q3HRS PRN Colton Perez M.D.       • lacosamide (VIMPAT) 300 mg in  mL ivpb  300 mg Intravenous Q12HRS Jass Velazquez M.D.   Stopped at 05/19/20 2338   • dexamethasone (DECADRON) injection 4 mg  4 mg Intravenous Q12HRS Guido Pierre M.D.   4 mg at 05/20/20 0512   • Respiratory Therapy Consult   Nebulization Continuous RT Nikhil Erickson D.O.       • ipratropium-albuterol (DUONEB) nebulizer solution  3 mL Nebulization Q2HRS PRN (RT) BRADY Reddy.O.       • famotidine (PEPCID) tablet 20 mg  20 mg Enteral Tube Q12HRS BRADY Reddy.O.   20 mg at 05/20/20 0512   • senna-docusate (PERICOLACE or SENOKOT S) 8.6-50 MG per tablet 2 Tab  2 Tab Enteral Tube BID Nikhil Erickson D.O.   2 Tab at 05/20/20 0512    And   • polyethylene glycol/lytes (MIRALAX) PACKET 1 Packet  1 Packet Enteral Tube QDAY PRN NICK ReddyO.   1 Packet at 05/17/20 1105    And   • magnesium hydroxide (MILK OF MAGNESIA) suspension 30 mL  30 mL Enteral Tube QDAY PRN Nikhil Erickson D.O.        And    • bisacodyl (DULCOLAX) suppository 10 mg  10 mg Rectal QDAY PRN Nikhil Erickson D.O.       • MD Alert...ICU Electrolyte Replacement per Pharmacy   Other PHARMACY TO DOSE Nikhil Erickson D.O.       • lidocaine (XYLOCAINE) 1 % injection 1-2 mL  1-2 mL Tracheal Tube Q30 MIN PRN Nikhil Erickson D.O.       • propofol (DIPRIVAN) injection  0-80 mcg/kg/min Intravenous Continuous Nikhil Erickson D.O. 11.2 mL/hr at 05/20/20 0710 20 mcg/kg/min at 05/20/20 0710   • ondansetron (ZOFRAN) syringe/vial injection 4 mg  4 mg Intravenous Q4HRS PRN Igor Carlos M.D.       • promethazine (PHENERGAN) suppository 12.5-25 mg  12.5-25 mg Rectal Q4HRS PRN Igor Carlos M.D.       • prochlorperazine (COMPAZINE) injection 5-10 mg  5-10 mg Intravenous Q4HRS PRN Igor Carlos M.D.       • LORazepam (ATIVAN) injection 4 mg  4 mg Intravenous Q10 MIN PRN Igor Carlos M.D.   4 mg at 05/15/20 1545   • levETIRAcetam (KEPPRA) 1,500 mg in  mL IVPB  1,500 mg Intravenous Q12HRS Igor Carlos M.D.   Stopped at 05/20/20 0527   • hydrALAZINE (APRESOLINE) injection 10 mg  10 mg Intravenous Q6HRS PRN Guido Pierre M.D.       • valproate (DEPACON) 750 mg in  mL IVPB  750 mg Intravenous BID Jass Velazquez M.D.   Stopped at 05/19/20 2142       Fluids    Intake/Output Summary (Last 24 hours) at 5/20/2020 0744  Last data filed at 5/20/2020 0600  Gross per 24 hour   Intake 5191.01 ml   Output 4075 ml   Net 1116.01 ml       Laboratory  Recent Labs     05/18/20  0439 05/19/20  0438 05/20/20  0343   ISTATAPH 7.405 7.402 7.391*   ISTATAPCO2 33.4 37.2* 37.7*   ISTATAPO2 75 79 84   ISTATATCO2 22 24 24   FGATLAQ2CYB 95 96 96   ISTATARTHCO3 21.0 23.1 22.9   ISTATARTBE -3 -1 -2   ISTATTEMP 98.8 F 98.2 F 98.2 F   ISTATFIO2 30 30 30   ISTATSPEC Arterial Arterial Arterial   ISTATAPHTC 7.404 7.405 7.395*   [id number] 76 78 83         Recent Labs     05/18/20  0330 05/19/20  0330 05/20/20  0332   SODIUM 146* 144 149*   POTASSIUM 3.1* 3.3* 3.8   CHLORIDE 113*  114* 117*   CO2 21 20 23   BUN 29* 31* 34*   CREATININE 0.83 0.68 0.79   MAGNESIUM 2.0  --   --    PHOSPHORUS 2.3*  --   --    CALCIUM 8.3* 7.9* 8.6     Recent Labs     05/18/20  0330 05/18/20  1236 05/19/20  0330 05/20/20  0332   ALTSGPT 27  --   --   --    ASTSGOT 7*  --   --   --    ALKPHOSPHAT 35  --   --   --    TBILIRUBIN 0.2  --   --   --    PREALBUMIN  --  23.9  --   --    GLUCOSE 172*  --  147* 151*     Recent Labs     05/18/20  0330 05/19/20 0330 05/20/20 0332   WBC 9.2 8.1 9.0   NEUTSPOLYS 83.80* 82.60* 86.10*   LYMPHOCYTES 7.10* 10.40* 7.80*   MONOCYTES 7.40 0.90 3.50   EOSINOPHILS 0.20 0.00 1.70   BASOPHILS 0.20 0.90 0.00   ASTSGOT 7*  --   --    ALTSGPT 27  --   --    ALKPHOSPHAT 35  --   --    TBILIRUBIN 0.2  --   --      Recent Labs     05/18/20  0330 05/19/20 0330 05/20/20  0332   RBC 4.09* 3.56* 3.79*   HEMOGLOBIN 12.9* 11.3* 11.8*   HEMATOCRIT 39.5* 34.5* 37.3*   PLATELETCT 146* 165 158*       Imaging  X-Ray:  I have personally reviewed the images and compared with prior images.  EKG:  I have personally reviewed the images and compared with prior images.  CT:    Reviewed  MRI:   Reviewed    Assessment/Plan  * Status epilepticus (HCC)- (present on admission)  Assessment & Plan  Refractory  Continue keppra, valproate, topamax and vimpat  Started on Ketamine 5/19; loaded 2mg/kg, now with current titration of ketamine gtt; still on low dose prop as well, will finish weaning ketamine then prop; will switch to fentanyl as needed for vent synchrony  continuous EEG - no evidence of seizure overnight; will keep in suppressed state x 24hrs then begin wean    Acute respiratory failure with hypoxia (HCC)  Assessment & Plan  Intubated for airway protection given drip medication required for seizure control  sbt once seizures resolved and able to wean sedation  RT/O2 protocols  Daily and PRN ABGs  Titration of ventilator therapy based on ABGs and patient's status  Sedation as tolerated/indicated  Daily  CXR  HOB >30 degrees and peridex for VAP prevention  Pepcid for GI prophylaxis  SAT/SBT when able (ABCDEF Bundle)    GBM (glioblastoma multiforme) (HCC)- (present on admission)  Assessment & Plan  S/p resection in 2/2020 at [facility]  Pt is undergoing chemo/radiation  On dexamethasone 4mg IV Q12H    Hyperglycemia- (present on admission)  Assessment & Plan  ssi if necessary  On steroids    Leukocytosis- (present on admission)  Assessment & Plan  ? Aspiration  On antibiotics    Hypokalemia- (present on admission)  Assessment & Plan  Goal > 4    Hyponatremia  Assessment & Plan  Stopped hypertonic saline drip  Monitor goal 135-145    Essential hypertension- (present on admission)  Assessment & Plan  Keep sbp < 160        VTE:  Contraindicated  Ulcer: H2 Antagonist  Lines: Central Line  Ongoing indication addressed and Novak Catheter  Ongoing indication addressed    I have performed a physical exam and reviewed and updated ROS and Plan today (5/20/2020). In review of yesterday's note (5/19/2020), there are no changes except as documented above.     Discussed patient condition and risk of morbidity and/or mortality with Family, RN, RT, Pharmacy, Code status disscussed, Charge nurse / hot rounds and neurology     The patient remains critically ill.  He is on a mechanical ventilator.  He is requiring a propofol and ketamine drips with active titration.  Critical care time = 35 minutes in directly providing and coordinating critical care and extensive data review.  No time overlap and excludes procedures.

## 2020-05-21 ENCOUNTER — APPOINTMENT (OUTPATIENT)
Dept: RADIOLOGY | Facility: MEDICAL CENTER | Age: 60
DRG: 100 | End: 2020-05-21
Attending: INTERNAL MEDICINE
Payer: OTHER MISCELLANEOUS

## 2020-05-21 LAB
ACTION RANGE TRIGGERED IACRT: NO
ANION GAP SERPL CALC-SCNC: 12 MMOL/L (ref 7–16)
BASE EXCESS BLDA CALC-SCNC: -3 MMOL/L (ref -4–3)
BASOPHILS # BLD AUTO: 0.9 % (ref 0–1.8)
BASOPHILS # BLD: 0.11 K/UL (ref 0–0.12)
BODY TEMPERATURE: ABNORMAL DEGREES
BUN SERPL-MCNC: 46 MG/DL (ref 8–22)
CALCIUM SERPL-MCNC: 8.7 MG/DL (ref 8.5–10.5)
CHLORIDE SERPL-SCNC: 114 MMOL/L (ref 96–112)
CO2 BLDA-SCNC: 21 MMOL/L (ref 20–33)
CO2 SERPL-SCNC: 21 MMOL/L (ref 20–33)
CREAT SERPL-MCNC: 0.79 MG/DL (ref 0.5–1.4)
EKG IMPRESSION: NORMAL
EOSINOPHIL # BLD AUTO: 0 K/UL (ref 0–0.51)
EOSINOPHIL NFR BLD: 0 % (ref 0–6.9)
ERYTHROCYTE [DISTWIDTH] IN BLOOD BY AUTOMATED COUNT: 57.4 FL (ref 35.9–50)
GLUCOSE SERPL-MCNC: 170 MG/DL (ref 65–99)
HCO3 BLDA-SCNC: 20.5 MMOL/L (ref 17–25)
HCT VFR BLD AUTO: 39 % (ref 42–52)
HGB BLD-MCNC: 12.4 G/DL (ref 14–18)
HOROWITZ INDEX BLDA+IHG-RTO: 317 MM[HG]
INST. QUALIFIED PATIENT IIQPT: YES
LYMPHOCYTES # BLD AUTO: 0.98 K/UL (ref 1–4.8)
LYMPHOCYTES NFR BLD: 7.8 % (ref 22–41)
MAGNESIUM SERPL-MCNC: 2.1 MG/DL (ref 1.5–2.5)
MANUAL DIFF BLD: NORMAL
MCH RBC QN AUTO: 31 PG (ref 27–33)
MCHC RBC AUTO-ENTMCNC: 31.8 G/DL (ref 33.7–35.3)
MCV RBC AUTO: 97.5 FL (ref 81.4–97.8)
METAMYELOCYTES NFR BLD MANUAL: 5.2 %
MONOCYTES # BLD AUTO: 1.2 K/UL (ref 0–0.85)
MONOCYTES NFR BLD AUTO: 9.6 % (ref 0–13.4)
MORPHOLOGY BLD-IMP: NORMAL
MYELOCYTES NFR BLD MANUAL: 4.3 %
NEUTROPHILS # BLD AUTO: 9.01 K/UL (ref 1.82–7.42)
NEUTROPHILS NFR BLD: 70.4 % (ref 44–72)
NEUTS BAND NFR BLD MANUAL: 1.7 % (ref 0–10)
NRBC # BLD AUTO: 0 K/UL
NRBC BLD-RTO: 0 /100 WBC
O2/TOTAL GAS SETTING VFR VENT: 30 %
PCO2 BLDA: 31 MMHG (ref 26–37)
PCO2 TEMP ADJ BLDA: 31.2 MMHG (ref 26–37)
PH BLDA: 7.43 [PH] (ref 7.4–7.5)
PH TEMP ADJ BLDA: 7.43 [PH] (ref 7.4–7.5)
PLATELET # BLD AUTO: 173 K/UL (ref 164–446)
PLATELET BLD QL SMEAR: NORMAL
PMV BLD AUTO: 10.3 FL (ref 9–12.9)
PO2 BLDA: 95 MMHG (ref 64–87)
PO2 TEMP ADJ BLDA: 96 MMHG (ref 64–87)
POTASSIUM SERPL-SCNC: 3.5 MMOL/L (ref 3.6–5.5)
PROCALCITONIN SERPL-MCNC: 0.35 NG/ML
RBC # BLD AUTO: 4 M/UL (ref 4.7–6.1)
RBC BLD AUTO: NORMAL
SAO2 % BLDA: 98 % (ref 93–99)
SODIUM SERPL-SCNC: 147 MMOL/L (ref 135–145)
SPECIMEN DRAWN FROM PATIENT: ABNORMAL
WBC # BLD AUTO: 12.5 K/UL (ref 4.8–10.8)

## 2020-05-21 PROCEDURE — C9254 INJECTION, LACOSAMIDE: HCPCS | Performed by: PSYCHIATRY & NEUROLOGY

## 2020-05-21 PROCEDURE — 83735 ASSAY OF MAGNESIUM: CPT

## 2020-05-21 PROCEDURE — 700111 HCHG RX REV CODE 636 W/ 250 OVERRIDE (IP): Performed by: PSYCHIATRY & NEUROLOGY

## 2020-05-21 PROCEDURE — A9270 NON-COVERED ITEM OR SERVICE: HCPCS | Performed by: INTERNAL MEDICINE

## 2020-05-21 PROCEDURE — A9270 NON-COVERED ITEM OR SERVICE: HCPCS | Performed by: PSYCHIATRY & NEUROLOGY

## 2020-05-21 PROCEDURE — 85027 COMPLETE CBC AUTOMATED: CPT

## 2020-05-21 PROCEDURE — 85007 BL SMEAR W/DIFF WBC COUNT: CPT

## 2020-05-21 PROCEDURE — 4A10X4Z MONITORING OF CENTRAL NERVOUS ELECTRICAL ACTIVITY, EXTERNAL APPROACH: ICD-10-PCS | Performed by: PSYCHIATRY & NEUROLOGY

## 2020-05-21 PROCEDURE — 80048 BASIC METABOLIC PNL TOTAL CA: CPT

## 2020-05-21 PROCEDURE — 94770 HCHG CO2 EXPIRED GAS DETERMINATION: CPT

## 2020-05-21 PROCEDURE — 93005 ELECTROCARDIOGRAM TRACING: CPT | Performed by: INTERNAL MEDICINE

## 2020-05-21 PROCEDURE — 94003 VENT MGMT INPAT SUBQ DAY: CPT

## 2020-05-21 PROCEDURE — 700102 HCHG RX REV CODE 250 W/ 637 OVERRIDE(OP): Performed by: INTERNAL MEDICINE

## 2020-05-21 PROCEDURE — 93010 ELECTROCARDIOGRAM REPORT: CPT | Performed by: INTERNAL MEDICINE

## 2020-05-21 PROCEDURE — 84145 PROCALCITONIN (PCT): CPT

## 2020-05-21 PROCEDURE — 95720 EEG PHY/QHP EA INCR W/VEEG: CPT | Performed by: PSYCHIATRY & NEUROLOGY

## 2020-05-21 PROCEDURE — 700105 HCHG RX REV CODE 258: Performed by: PSYCHIATRY & NEUROLOGY

## 2020-05-21 PROCEDURE — 82803 BLOOD GASES ANY COMBINATION: CPT

## 2020-05-21 PROCEDURE — 94760 N-INVAS EAR/PLS OXIMETRY 1: CPT

## 2020-05-21 PROCEDURE — 700111 HCHG RX REV CODE 636 W/ 250 OVERRIDE (IP): Performed by: HOSPITALIST

## 2020-05-21 PROCEDURE — 700111 HCHG RX REV CODE 636 W/ 250 OVERRIDE (IP): Performed by: FAMILY MEDICINE

## 2020-05-21 PROCEDURE — 700101 HCHG RX REV CODE 250: Performed by: PSYCHIATRY & NEUROLOGY

## 2020-05-21 PROCEDURE — 770022 HCHG ROOM/CARE - ICU (200)

## 2020-05-21 PROCEDURE — 71045 X-RAY EXAM CHEST 1 VIEW: CPT

## 2020-05-21 PROCEDURE — 700111 HCHG RX REV CODE 636 W/ 250 OVERRIDE (IP): Performed by: INTERNAL MEDICINE

## 2020-05-21 PROCEDURE — 99291 CRITICAL CARE FIRST HOUR: CPT | Performed by: PSYCHIATRY & NEUROLOGY

## 2020-05-21 PROCEDURE — 700105 HCHG RX REV CODE 258: Performed by: HOSPITALIST

## 2020-05-21 PROCEDURE — 36600 WITHDRAWAL OF ARTERIAL BLOOD: CPT

## 2020-05-21 PROCEDURE — 700105 HCHG RX REV CODE 258: Performed by: INTERNAL MEDICINE

## 2020-05-21 PROCEDURE — 700102 HCHG RX REV CODE 250 W/ 637 OVERRIDE(OP): Performed by: PSYCHIATRY & NEUROLOGY

## 2020-05-21 PROCEDURE — 95714 VEEG EA 12-26 HR UNMNTR: CPT | Performed by: PSYCHIATRY & NEUROLOGY

## 2020-05-21 PROCEDURE — 700101 HCHG RX REV CODE 250: Performed by: INTERNAL MEDICINE

## 2020-05-21 RX ORDER — AMLODIPINE BESYLATE 5 MG/1
5 TABLET ORAL
Status: DISCONTINUED | OUTPATIENT
Start: 2020-05-21 | End: 2020-06-05 | Stop reason: HOSPADM

## 2020-05-21 RX ORDER — DEXMEDETOMIDINE HYDROCHLORIDE 4 UG/ML
0-1.5 INJECTION INTRAVENOUS CONTINUOUS
Status: DISCONTINUED | OUTPATIENT
Start: 2020-05-21 | End: 2020-05-24

## 2020-05-21 RX ORDER — DEXAMETHASONE SODIUM PHOSPHATE 4 MG/ML
2 INJECTION, SOLUTION INTRA-ARTICULAR; INTRALESIONAL; INTRAMUSCULAR; INTRAVENOUS; SOFT TISSUE EVERY 12 HOURS
Status: DISCONTINUED | OUTPATIENT
Start: 2020-05-21 | End: 2020-05-25

## 2020-05-21 RX ADMIN — POTASSIUM BICARBONATE 50 MEQ: 978 TABLET, EFFERVESCENT ORAL at 10:50

## 2020-05-21 RX ADMIN — DEXAMETHASONE SODIUM PHOSPHATE 4 MG: 4 INJECTION, SOLUTION INTRA-ARTICULAR; INTRALESIONAL; INTRAMUSCULAR; INTRAVENOUS; SOFT TISSUE at 05:42

## 2020-05-21 RX ADMIN — FAMOTIDINE 20 MG: 20 TABLET ORAL at 05:46

## 2020-05-21 RX ADMIN — ENOXAPARIN SODIUM 40 MG: 100 INJECTION SUBCUTANEOUS at 05:39

## 2020-05-21 RX ADMIN — LEVETIRACETAM 1500 MG: 100 INJECTION, SOLUTION, CONCENTRATE INTRAVENOUS at 06:15

## 2020-05-21 RX ADMIN — DOCUSATE SODIUM 50 MG AND SENNOSIDES 8.6 MG 2 TABLET: 8.6; 5 TABLET, FILM COATED ORAL at 05:46

## 2020-05-21 RX ADMIN — TOPIRAMATE 200 MG: 100 TABLET, FILM COATED ORAL at 05:47

## 2020-05-21 RX ADMIN — VALPROATE SODIUM 750 MG: 100 INJECTION, SOLUTION INTRAVENOUS at 11:43

## 2020-05-21 RX ADMIN — FUROSEMIDE 40 MG: 10 INJECTION, SOLUTION INTRAMUSCULAR; INTRAVENOUS at 05:44

## 2020-05-21 RX ADMIN — LOSARTAN POTASSIUM 100 MG: 50 TABLET, FILM COATED ORAL at 17:15

## 2020-05-21 RX ADMIN — VALPROATE SODIUM 750 MG: 100 INJECTION, SOLUTION INTRAVENOUS at 21:29

## 2020-05-21 RX ADMIN — AMPICILLIN SODIUM AND SULBACTAM SODIUM 3 G: 2; 1 INJECTION, POWDER, FOR SOLUTION INTRAMUSCULAR; INTRAVENOUS at 05:40

## 2020-05-21 RX ADMIN — DEXAMETHASONE SODIUM PHOSPHATE 2 MG: 4 INJECTION, SOLUTION INTRA-ARTICULAR; INTRALESIONAL; INTRAMUSCULAR; INTRAVENOUS; SOFT TISSUE at 17:15

## 2020-05-21 RX ADMIN — HYDRALAZINE HYDROCHLORIDE 10 MG: 20 INJECTION INTRAMUSCULAR; INTRAVENOUS at 01:04

## 2020-05-21 RX ADMIN — SODIUM CHLORIDE 1 MG/KG/HR: 9 INJECTION, SOLUTION INTRAVENOUS at 02:02

## 2020-05-21 RX ADMIN — AMLODIPINE BESYLATE 5 MG: 5 TABLET ORAL at 10:50

## 2020-05-21 RX ADMIN — AMPICILLIN SODIUM AND SULBACTAM SODIUM 3 G: 2; 1 INJECTION, POWDER, FOR SOLUTION INTRAMUSCULAR; INTRAVENOUS at 17:14

## 2020-05-21 RX ADMIN — Medication 100 MCG/HR: at 16:13

## 2020-05-21 RX ADMIN — FAMOTIDINE 20 MG: 20 TABLET ORAL at 17:15

## 2020-05-21 RX ADMIN — SODIUM CHLORIDE 300 MG: 9 INJECTION, SOLUTION INTRAVENOUS at 10:50

## 2020-05-21 RX ADMIN — AMPICILLIN SODIUM AND SULBACTAM SODIUM 3 G: 2; 1 INJECTION, POWDER, FOR SOLUTION INTRAMUSCULAR; INTRAVENOUS at 23:05

## 2020-05-21 RX ADMIN — TOPIRAMATE 200 MG: 100 TABLET, FILM COATED ORAL at 17:15

## 2020-05-21 RX ADMIN — AMPICILLIN SODIUM AND SULBACTAM SODIUM 3 G: 2; 1 INJECTION, POWDER, FOR SOLUTION INTRAMUSCULAR; INTRAVENOUS at 11:43

## 2020-05-21 RX ADMIN — LEVETIRACETAM 1500 MG: 100 INJECTION, SOLUTION, CONCENTRATE INTRAVENOUS at 17:15

## 2020-05-21 RX ADMIN — AMPICILLIN SODIUM AND SULBACTAM SODIUM 3 G: 2; 1 INJECTION, POWDER, FOR SOLUTION INTRAMUSCULAR; INTRAVENOUS at 00:00

## 2020-05-21 RX ADMIN — DOCUSATE SODIUM 50 MG AND SENNOSIDES 8.6 MG 2 TABLET: 8.6; 5 TABLET, FILM COATED ORAL at 17:15

## 2020-05-21 RX ADMIN — SODIUM CHLORIDE 300 MG: 9 INJECTION, SOLUTION INTRAVENOUS at 20:15

## 2020-05-21 NOTE — PROCEDURES
VIDEO ELECTROENCEPHALOGRAM REPORT        Referring provider: Dr. Velazquez.      DOS: 5/21/2020 (total recording of 23 hours and 37 minutes).      INDICATION:  Ahmet Escobedo 60 y.o. male presenting with h/o brain tumor, seizures.      CURRENT ANTIEPILEPTIC REGIMEN: Levetiracetam 1500 mg q 12 hrs, Lacosamide 300 mg q 12 hrs, Valproic Acid 750 mg q 12 hrs, and Topiramate 200 mg q 12 hrs. Ketamine was discontinued. Mildly sedated with Fentanyl.      TECHNIQUE: 30 channel video electroencephalogram (EEG) was performed in accordance with the international 10-20 system. The study was reviewed in bipolar and referential montages. The recording examined a mildly sedated patient with brief arousals.      DESCRIPTION OF THE RECORD:  Waxing and waning of the cerebral electrical activity. There is slowing in the left hemisphere. Frequent left posterior quadrant spikes/polyspikes and sharps noted with frequent but brief runs of left lateralized periodic discharges. . No seizures captured.      ACTIVATION PROCEDURES:   Not performed.      EKG: sampling of the EKG recording demonstrated sinus rhythm.      EVENTS: None.      INTERPRETATION:  This is an abnormal video EEG recording in a sedated patient. There is slowing in the left hemisphere. Frequent left posterior quadrant spikes/polyspikes and sharps noted with frequent but brief runs of left lateralized periodic discharges. No seizures captured. The patient remains at risk for seizure recurrence. The findings suggest a large underlying area of cortical irritability and structural abnormality. Clinical and radiological correlation is recommended.     Updates provided to Dr. Jad Wise MD   Epilepsy and Neurodiagnostics.   Clinical  of Neurology [facility].   Diplomate in Neurology, Epilepsy, and Electrodiagnostic Medicine.   Office: [phone]  Fax: [phone]

## 2020-05-21 NOTE — PROGRESS NOTES
Critical Care Progress Note    Date of admission  5/13/2020    Chief Complaint  59yo male with hx of GBM s/p resection in 2/2020, hx of seizures now admitted for recurrent seizures. Pt is on keppra 750mg PO BID and low dose dexamethasone 0.25mg PO daily at home. He presented after having right sided convulsive movements and slurred speech.      Pt was started on topamax, and valproate, and vimpat since admission. Keppra dose was increased to 1500 Q12H. On dexamethasone 4 Q12H. Given ativan of total 6mg since admission. Pt is somnolent and given the quick escalation of his antiseizure meds to control his seizures, pt's transferred to ICU for further management.  Upon ICU arrival pt was obtunded       Hospital Course          Interval Problem Update  Reviewed last 24 hour events:  - No sz on EEG overnight    - Neuro: GCS 3   - HR: 50-60s   - SBP: 1201-30s   - GI TF at goal   - UOP: 4.2L/24h   - Novak: yes   - Tm: 37.6   - ABG: Reviewed   - CXR (personally reviewed and compared to prior)bibasilar atelectasis. No pneumothorax. No obvious pleural effusion   - Current titration of a ketamine and prop gtts   - Replete K+       AEDs  Keppra  Vimpat  Topamax  Depacon      Review of Systems  Review of Systems   Unable to perform ROS: Intubated        Vital Signs for last 24 hours   Pulse:  [55-72] 60  Resp:  [16-27] 17  BP: (113-170)/(60-82) 141/80  SpO2:  [91 %-98 %] 96 %    Hemodynamic parameters for last 24 hours       Respiratory Information for the last 24 hours  Vent Mode: ASV  Rate (breaths/min): 16  Vt Target (mL): 440  PEEP/CPAP: 8  MAP: 11  Control VTE (exp VT): 608    Physical Exam   Physical Exam  Vitals signs and nursing note reviewed.   Constitutional:       General: He is not in acute distress.     Appearance: He is ill-appearing. He is not toxic-appearing or diaphoretic.   HENT:      Head: Normocephalic and atraumatic.      Right Ear: External ear normal.      Left Ear: External ear normal.      Nose: Nose  normal.      Mouth/Throat:      Mouth: Mucous membranes are moist.      Pharynx: Oropharynx is clear. No oropharyngeal exudate or posterior oropharyngeal erythema.   Eyes:      General: No scleral icterus.        Right eye: No discharge.         Left eye: No discharge.      Conjunctiva/sclera: Conjunctivae normal.      Pupils: Pupils are equal, round, and reactive to light.   Neck:      Musculoskeletal: Normal range of motion.   Cardiovascular:      Rate and Rhythm: Normal rate and regular rhythm.      Pulses: Normal pulses.      Heart sounds: Normal heart sounds. No murmur.   Pulmonary:      Breath sounds: No stridor. No wheezing, rhonchi or rales.      Comments: On ventilator  Abdominal:      General: There is no distension.      Tenderness: There is no abdominal tenderness.   Musculoskeletal:         General: No swelling or tenderness.      Right lower leg: No edema.      Left lower leg: No edema.   Skin:     Coloration: Skin is not jaundiced.      Findings: No rash.   Neurological:      Mental Status: He is alert.      Cranial Nerves: No cranial nerve deficit.      Comments: GCS 8t [id number] reactive. Follows commands. Squeezes hands moves toes         Medications  Current Facility-Administered Medications   Medication Dose Route Frequency Provider Last Rate Last Dose   • fentaNYL (SUBLIMAZE) 50 mcg/mL in 50mL (Continuous Infusion)   Intravenous Continuous Nik Jones M.D.   50 mcg at 05/20/20 1147   • scopolamine (TRANSDERM-SCOP) patch 1 Patch  1 Patch Transdermal Q72HRS Nik Jones M.D.   1 Patch at 05/19/20 1304   • ketamine 1,000 mg in  mL infusion (critical care only)  0-2.5 mg/kg/hr Intravenous Continuous Nik Jones M.D. 46.8 mL/hr at 05/21/20 0202 1 mg/kg/hr at 05/21/20 0202   • furosemide (LASIX) injection 40 mg  40 mg Intravenous Q DAY Colton Perez M.D.   40 mg at 05/21/20 0544   • enoxaparin (LOVENOX) inj 40 mg  40 mg Subcutaneous DAILY Colton Perez M.D.   40 mg at 05/21/20  0539   • ampicillin/sulbactam (UNASYN) 3 g in  mL IVPB  3 g Intravenous Q6HRS Colton Perez M.D. 200 mL/hr at 05/21/20 0540 3 g at 05/21/20 0540   • Pharmacy Consult: Enteral tube insertion - review meds/change route/product selection  1 Each Other PHARMACY TO DOSE Colton Perez M.D.       • losartan (COZAAR) tablet 100 mg  100 mg Enteral Tube DAILY Colton Perez M.D.   100 mg at 05/20/20 1744   • topiramate (TOPAMAX) tablet 200 mg  200 mg Enteral Tube Q12HRS Colton Perez M.D.   200 mg at 05/21/20 0547   • acetaminophen (TYLENOL) tablet 650 mg  650 mg Enteral Tube Q6HRS PRN Colton Perez M.D.   650 mg at 05/20/20 1937   • ondansetron (ZOFRAN ODT) dispertab 4 mg  4 mg Enteral Tube Q4HRS PRN Colton Perez M.D.       • promethazine (PHENERGAN) tablet 12.5-25 mg  12.5-25 mg Enteral Tube Q4HRS PRN Colton Perez M.D.       • Pharmacy Consult Request ...Pain Management Review 1 Each  1 Each Other PHARMACY TO DOSE Colton Perez M.D.        And   • oxyCODONE immediate-release (ROXICODONE) tablet 2.5 mg  2.5 mg Enteral Tube Q3HRS PRN Colton Perez M.D.        And   • oxyCODONE immediate-release (ROXICODONE) tablet 5 mg  5 mg Enteral Tube Q3HRS PRN Colton Perez M.D.       • lacosamide (VIMPAT) 300 mg in  mL ivpb  300 mg Intravenous Q12HRS Jass Velazquez M.D.   Stopped at 05/20/20 2247   • dexamethasone (DECADRON) injection 4 mg  4 mg Intravenous Q12HRS Guido Pierre M.D.   4 mg at 05/21/20 0542   • Respiratory Therapy Consult   Nebulization Continuous RT Nikhil Erickson D.O.       • ipratropium-albuterol (DUONEB) nebulizer solution  3 mL Nebulization Q2HRS PRN (RT) NICK ReddyO.       • famotidine (PEPCID) tablet 20 mg  20 mg Enteral Tube Q12HRS NICK ReddyO.   20 mg at 05/21/20 0546   • senna-docusate (PERICOLACE or SENOKOT S) 8.6-50 MG per tablet 2 Tab  2 Tab Enteral Tube BID NICK ReddyO.   2 Tab at 05/21/20 0546    And   • polyethylene glycol/lytes (MIRALAX) PACKET 1 Packet  1 Packet  Enteral Tube QDAY PRN Nikhil Erickson D.O.   1 Packet at 05/17/20 1105    And   • magnesium hydroxide (MILK OF MAGNESIA) suspension 30 mL  30 mL Enteral Tube QDAY PRN Nikhil Erickson D.O.        And   • bisacodyl (DULCOLAX) suppository 10 mg  10 mg Rectal QDAY PRN Nikhil Erickson D.O.       • MD Alert...ICU Electrolyte Replacement per Pharmacy   Other PHARMACY TO DOSE Nikhil Erickson D.O.       • lidocaine (XYLOCAINE) 1 % injection 1-2 mL  1-2 mL Tracheal Tube Q30 MIN PRN Nikhil Erickson D.O.       • propofol (DIPRIVAN) injection  0-80 mcg/kg/min Intravenous Continuous Nikhil Erickson D.O.   Stopped at 05/20/20 1600   • ondansetron (ZOFRAN) syringe/vial injection 4 mg  4 mg Intravenous Q4HRS PRN Igor Carlos M.D.       • promethazine (PHENERGAN) suppository 12.5-25 mg  12.5-25 mg Rectal Q4HRS PRN Igor Carlos M.D.       • prochlorperazine (COMPAZINE) injection 5-10 mg  5-10 mg Intravenous Q4HRS PRN Igor Carlos M.D.       • LORazepam (ATIVAN) injection 4 mg  4 mg Intravenous Q10 MIN PRN Igor Carlos M.D.   4 mg at 05/15/20 1545   • levETIRAcetam (KEPPRA) 1,500 mg in  mL IVPB  1,500 mg Intravenous Q12HRS Igor Carlos M.D. 400 mL/hr at 05/21/20 0615 1,500 mg at 05/21/20 0615   • hydrALAZINE (APRESOLINE) injection 10 mg  10 mg Intravenous Q6HRS PRN Guido Pierre M.D.   10 mg at 05/21/20 0104   • valproate (DEPACON) 750 mg in  mL IVPB  750 mg Intravenous BID Jass Velazquez M.D.   Stopped at 05/20/20 2053       Fluids    Intake/Output Summary (Last 24 hours) at 5/21/2020 0810  Last data filed at 5/21/2020 0600  Gross per 24 hour   Intake 2409.72 ml   Output 2440 ml   Net -30.28 ml       Laboratory  Recent Labs     05/19/20  0438 05/20/20  0343 05/21/20  0403   ISTATAPH 7.402 7.391* 7.429   ISTATAPCO2 37.2* 37.7* 31.0   ISTATAPO2 79 84 95*   ISTATATCO2 24 24 21   CLWLWCW1QSN 96 96 98   ISTATARTHCO3 23.1 22.9 20.5   ISTATARTBE -1 -2 -3   ISTATTEMP 98.2 F 98.2 F 98.8 F   ISTATFIO2 30 30 30   ISTATSPEC Arterial  Arterial Arterial   ISTATAPHTC 7.405 7.395* 7.427   [id number] 78 83 96*         Recent Labs     05/19/20  0330 05/20/20  0332 05/21/20  0330   SODIUM 144 149* 147*   POTASSIUM 3.3* 3.8 3.5*   CHLORIDE 114* 117* 114*   CO2 20 23 21   BUN 31* 34* 46*   CREATININE 0.68 0.79 0.79   CALCIUM 7.9* 8.6 8.7     Recent Labs     05/18/20  1236 05/19/20  0330 05/20/20  0332 05/21/20  0330   PREALBUMIN 23.9  --   --   --    GLUCOSE  --  147* 151* 170*     Recent Labs     05/19/20  0330 05/20/20  0332 05/21/20  0330   WBC 8.1 9.0 12.5*   NEUTSPOLYS 82.60* 86.10* 70.40   LYMPHOCYTES 10.40* 7.80* 7.80*   MONOCYTES 0.90 3.50 9.60   EOSINOPHILS 0.00 1.70 0.00   BASOPHILS 0.90 0.00 0.90     Recent Labs     05/19/20  0330 05/20/20  0332 05/21/20  0330   RBC 3.56* 3.79* 4.00*   HEMOGLOBIN 11.3* 11.8* 12.4*   HEMATOCRIT 34.5* 37.3* 39.0*   PLATELETCT 165 158* 173       Imaging  X-Ray:  I have personally reviewed the images and compared with prior images.  EKG:  I have personally reviewed the images and compared with prior images.  CT:    Reviewed  MRI:   Reviewed    Assessment/Plan  * Status epilepticus (HCC)- (present on admission)  Assessment & Plan  Refractory  Continue keppra, valproate, topamax and vimpat  No off Ketamine and Propofol  Monitor EEG for any evidence or recurrence  Fentanyl gtt for vent synchrony and comfort until able to liberate from the MV    Acute respiratory failure with hypoxia (HCC)  Assessment & Plan  Intubated for airway protection given drip medication required for seizure control  sbt once seizures resolved and able to wean sedation  RT/O2 protocols  Daily and PRN ABGs  Titration of ventilator therapy based on ABGs and patient's status  Sedation as tolerated/indicated  Daily CXR  HOB >30 degrees and peridex for VAP prevention  Pepcid for GI prophylaxis  SAT/SBT when able (ABCDEF Bundle)    GBM (glioblastoma multiforme) (HCC)- (present on admission)  Assessment & Plan  S/p resection in 2/2020 [facility]  Pt is  undergoing chemo/radiation  On dexamethasone 2mg IV Q12H; will continue to wean as able    Awaiting MRI results from his other institution as MRI here may be suggestive of some recurrence  Will need follow up with his NeuroOnc team after discharge from the ICU and hospital    Hyperglycemia- (present on admission)  Assessment & Plan  ssi if necessary  On steroids    Leukocytosis- (present on admission)  Assessment & Plan  ? Aspiration  On antibiotics    Hypokalemia- (present on admission)  Assessment & Plan  Goal > 4    Hyponatremia  Assessment & Plan  Monitor   goal 135-145    Essential hypertension- (present on admission)  Assessment & Plan  Keep sbp < 160        VTE:  Contraindicated  Ulcer: H2 Antagonist  Lines: Central Line  Ongoing indication addressed and Novak Catheter  Ongoing indication addressed    I have performed a physical exam and reviewed and updated ROS and Plan today (5/21/2020). In review of yesterday's note (5/20/2020), there are no changes except as documented above.     Discussed patient condition and risk of morbidity and/or mortality with Family, RN, RT, Pharmacy, Code status disscussed, Charge nurse / hot rounds and neurology     The patient remains critically ill.  He is on a mechanical ventilator.  He is requiring a propofol and ketamine drips with active titration.  Critical care time = 40 minutes in directly providing and coordinating critical care and extensive data review.  No time overlap and excludes procedures.

## 2020-05-21 NOTE — CARE PLAN
Adult Ventilation Update    Total Vent Days: 7  %/+8/30%  Patient Lines/Drains/Airways Status    Active Airway     Name: Placement date: Placement time: Site: Days:    Airway ETT Oral 8.0  05/15/20   1905   Oral  7               Plateau Pressure: 28 (05/20/20 1945)  Static Compliance (ml / cm H2O): 106 (05/21/20 1438)    Patient failed trials because of Barriers to Wean: (cont eeg) (05/21/20 1438)  Barriers to SBT    Length of Weaning Trial        (ASV) % MIN VOL: 160 (05/21/20 1438)  ASV Inspiratory Pressures 5  % Spontaneous 100    Sputum/Suction   Cough: Productive (05/21/20 1438)  Sputum Amount: Moderate (05/21/20 1438)  Sputum Color: Clear;Yellow (05/21/20 1438)  Sputum Consistency: Thick (05/21/20 1438)    Events/Summary/Plan: HME replaced (05/21/20 1205)

## 2020-05-21 NOTE — CARE PLAN
Problem: Nutritional:  Goal: Nutrition support tolerated and meeting greater than 85% of estimated needs  Outcome: MET  TF @ goal without propofol

## 2020-05-21 NOTE — CARE PLAN
Problem: Ventilation Defect:  Goal: Ability to achieve and maintain unassisted ventilation or tolerate decreased levels of ventilator support  5/20/2020 1724 by Dori Cook, RRT  Outcome: PROGRESSING AS EXPECTED  Adult Ventilation Update    Total Vent Days: 4  %/+8/30%  Patient Lines/Drains/Airways Status      Active Airway       Name: Placement date: Placement time: Site: Days:    Airway ETT Oral 8.0  05/15/20   1905   Oral  4              Plateau Pressure: 15 (05/19/20 2200)  Static Compliance (ml / cm H2O): 51.8 (05/20/20 1357)    Patient failed trials because of Barriers to Wean: Other (Comments)(Cont EEG) (05/20/20 1357)  Barriers to SBT    Length of Weaning Trial      (ASV) % MIN VOL: 160 (05/20/20 1357)  ASV Inspiratory Pressures  % Spontaneous     Sputum/Suction   Cough: Productive (05/20/20 1600)  Sputum Amount: Small (05/20/20 1600)  Sputum Color: Clear (05/20/20 1600)  Sputum Consistency: Thick (05/20/20 1600)    Mobility  Level of Mobility: Level I (05/20/20 0800)  Activity Performed: Unable to mobilize (05/19/20 2000)  Ambulation Tolerance: General Weakness (05/15/20 2200)  Pt Calls for Assistance: Yes (05/14/20 0410)  Gait: Unable to Ambulate (05/19/20 0400)  Assistive Devices: None (05/14/20 0410)  Reason Not Mobilized: Unstable condition(continuous EEG, ketamine gtt, seizures) (05/20/20 0800)  Mobilization Comments: Continuous EEG (05/18/20 0800)    Events/Summary/Plan: Pt switched to % to match min ventilation 11.4 per MD  (05/20/20 1037)

## 2020-05-21 NOTE — CARE PLAN
Ventilator Daily Summary    Vent Day # 6    Ventilator settings changed this shift: not at this time    Weaning trials: no cont. EEG    Respiratory Procedures: not at this time    Plan: Continue current ventilator settings and wean mechanical ventilation as tolerated per physician orders.

## 2020-05-21 NOTE — DISCHARGE PLANNING
This CM attempted to call Jerrica AMARAL to obtain assessment information and give updates, left  for her to call back.          Care Transition Team Assessment    Information Source  Orientation : Unable to Assess  Information Given By: Significant Other  Informant's Name: Jerrica         Elopement Risk  Legal Hold: No  Ambulatory or Self Mobile in Wheelchair: No-Not an Elopement Risk  Elopement Risk: Not at Risk for Elopement    Interdisciplinary Discharge Planning  Does Admitting Nurse Feel This Could be a Complex Discharge?: No  Lives with - Patient's Self Care Capacity: Significant Other  Patient or legal guardian wants to designate a caregiver (see row info): No  Support Systems: Spouse / Significant Other, Family Member(s)  Housing / Facility: 1 Story Apartment / Condo  Do You Take your Prescribed Medications Regularly: Yes  Able to Return to Previous ADL's: Future Time w/Therapy  Mobility Issues: No  Prior Services: Home-Independent  Patient Expects to be Discharged to:: TBD  Assistance Needed: Unknown at this Time  Durable Medical Equipment: Unknown    Discharge Preparedness  What is your plan after discharge?: Uncertain - pending medical team collaboration  What are your discharge supports?: Partner  Prior Functional Level: Ambulatory    Functional Assesment  Prior Functional Level: Ambulatory         Vision / Hearing Impairment  Vision Impairment : No  Hearing Impairment : No              Domestic Abuse  Have you ever been the victim of abuse or violence?: No  Physical Abuse or Sexual Abuse: No  Verbal Abuse or Emotional Abuse: No  Possible Abuse Reported to:: Not Applicable         Discharge Risks or Barriers  Discharge risks or barriers?: No    Anticipated Discharge Information  Anticipated discharge disposition: Home

## 2020-05-22 ENCOUNTER — APPOINTMENT (OUTPATIENT)
Dept: RADIOLOGY | Facility: MEDICAL CENTER | Age: 60
DRG: 100 | End: 2020-05-22
Attending: INTERNAL MEDICINE
Payer: OTHER MISCELLANEOUS

## 2020-05-22 ENCOUNTER — APPOINTMENT (OUTPATIENT)
Dept: RADIOLOGY | Facility: MEDICAL CENTER | Age: 60
DRG: 100 | End: 2020-05-22
Attending: PSYCHIATRY & NEUROLOGY
Payer: OTHER MISCELLANEOUS

## 2020-05-22 LAB
ACTION RANGE TRIGGERED IACRT: NO
ANION GAP SERPL CALC-SCNC: 10 MMOL/L (ref 7–16)
ANISOCYTOSIS BLD QL SMEAR: ABNORMAL
BASE EXCESS BLDA CALC-SCNC: -4 MMOL/L (ref -4–3)
BASOPHILS # BLD AUTO: 0.9 % (ref 0–1.8)
BASOPHILS # BLD: 0.09 K/UL (ref 0–0.12)
BODY TEMPERATURE: ABNORMAL DEGREES
BUN SERPL-MCNC: 50 MG/DL (ref 8–22)
CALCIUM SERPL-MCNC: 8.9 MG/DL (ref 8.5–10.5)
CHLORIDE SERPL-SCNC: 110 MMOL/L (ref 96–112)
CO2 BLDA-SCNC: 20 MMOL/L (ref 20–33)
CO2 SERPL-SCNC: 20 MMOL/L (ref 20–33)
CREAT SERPL-MCNC: 0.68 MG/DL (ref 0.5–1.4)
EOSINOPHIL # BLD AUTO: 0.18 K/UL (ref 0–0.51)
EOSINOPHIL NFR BLD: 1.7 % (ref 0–6.9)
ERYTHROCYTE [DISTWIDTH] IN BLOOD BY AUTOMATED COUNT: 56.6 FL (ref 35.9–50)
GLUCOSE SERPL-MCNC: 184 MG/DL (ref 65–99)
HCO3 BLDA-SCNC: 19.3 MMOL/L (ref 17–25)
HCT VFR BLD AUTO: 40.5 % (ref 42–52)
HGB BLD-MCNC: 12.7 G/DL (ref 14–18)
HOROWITZ INDEX BLDA+IHG-RTO: 297 MM[HG]
INST. QUALIFIED PATIENT IIQPT: YES
LYMPHOCYTES # BLD AUTO: 1.33 K/UL (ref 1–4.8)
LYMPHOCYTES NFR BLD: 12.9 % (ref 22–41)
MANUAL DIFF BLD: NORMAL
MCH RBC QN AUTO: 30.9 PG (ref 27–33)
MCHC RBC AUTO-ENTMCNC: 31.4 G/DL (ref 33.7–35.3)
MCV RBC AUTO: 98.5 FL (ref 81.4–97.8)
METAMYELOCYTES NFR BLD MANUAL: 6.9 %
MICROCYTES BLD QL SMEAR: ABNORMAL
MONOCYTES # BLD AUTO: 0.89 K/UL (ref 0–0.85)
MONOCYTES NFR BLD AUTO: 8.6 % (ref 0–13.4)
MORPHOLOGY BLD-IMP: NORMAL
MYELOCYTES NFR BLD MANUAL: 7.8 %
NEUTROPHILS # BLD AUTO: 6.3 K/UL (ref 1.82–7.42)
NEUTROPHILS NFR BLD: 61.2 % (ref 44–72)
NRBC # BLD AUTO: 0 K/UL
NRBC BLD-RTO: 0 /100 WBC
O2/TOTAL GAS SETTING VFR VENT: 30 %
OVALOCYTES BLD QL SMEAR: NORMAL
PCO2 BLDA: 29.9 MMHG (ref 26–37)
PCO2 TEMP ADJ BLDA: 29.9 MMHG (ref 26–37)
PH BLDA: 7.42 [PH] (ref 7.4–7.5)
PH TEMP ADJ BLDA: 7.42 [PH] (ref 7.4–7.5)
PLATELET # BLD AUTO: 164 K/UL (ref 164–446)
PLATELET BLD QL SMEAR: NORMAL
PMV BLD AUTO: 10.4 FL (ref 9–12.9)
PO2 BLDA: 89 MMHG (ref 64–87)
PO2 TEMP ADJ BLDA: 89 MMHG (ref 64–87)
POIKILOCYTOSIS BLD QL SMEAR: NORMAL
POTASSIUM SERPL-SCNC: 3.4 MMOL/L (ref 3.6–5.5)
RBC # BLD AUTO: 4.11 M/UL (ref 4.7–6.1)
RBC BLD AUTO: PRESENT
SAO2 % BLDA: 97 % (ref 93–99)
SODIUM SERPL-SCNC: 140 MMOL/L (ref 135–145)
SPECIMEN DRAWN FROM PATIENT: ABNORMAL
WBC # BLD AUTO: 10.3 K/UL (ref 4.8–10.8)

## 2020-05-22 PROCEDURE — 770022 HCHG ROOM/CARE - ICU (200)

## 2020-05-22 PROCEDURE — 95720 EEG PHY/QHP EA INCR W/VEEG: CPT | Performed by: PSYCHIATRY & NEUROLOGY

## 2020-05-22 PROCEDURE — 94003 VENT MGMT INPAT SUBQ DAY: CPT

## 2020-05-22 PROCEDURE — 85027 COMPLETE CBC AUTOMATED: CPT

## 2020-05-22 PROCEDURE — 700105 HCHG RX REV CODE 258: Performed by: INTERNAL MEDICINE

## 2020-05-22 PROCEDURE — 71045 X-RAY EXAM CHEST 1 VIEW: CPT

## 2020-05-22 PROCEDURE — 4A10X4Z MONITORING OF CENTRAL NERVOUS ELECTRICAL ACTIVITY, EXTERNAL APPROACH: ICD-10-PCS | Performed by: PSYCHIATRY & NEUROLOGY

## 2020-05-22 PROCEDURE — 700105 HCHG RX REV CODE 258: Performed by: PSYCHIATRY & NEUROLOGY

## 2020-05-22 PROCEDURE — 700105 HCHG RX REV CODE 258: Performed by: HOSPITALIST

## 2020-05-22 PROCEDURE — 99291 CRITICAL CARE FIRST HOUR: CPT | Performed by: PSYCHIATRY & NEUROLOGY

## 2020-05-22 PROCEDURE — 700111 HCHG RX REV CODE 636 W/ 250 OVERRIDE (IP): Performed by: INTERNAL MEDICINE

## 2020-05-22 PROCEDURE — 82962 GLUCOSE BLOOD TEST: CPT

## 2020-05-22 PROCEDURE — C9254 INJECTION, LACOSAMIDE: HCPCS | Performed by: PSYCHIATRY & NEUROLOGY

## 2020-05-22 PROCEDURE — 700101 HCHG RX REV CODE 250: Performed by: INTERNAL MEDICINE

## 2020-05-22 PROCEDURE — A9270 NON-COVERED ITEM OR SERVICE: HCPCS | Performed by: PSYCHIATRY & NEUROLOGY

## 2020-05-22 PROCEDURE — 700102 HCHG RX REV CODE 250 W/ 637 OVERRIDE(OP): Performed by: INTERNAL MEDICINE

## 2020-05-22 PROCEDURE — 95714 VEEG EA 12-26 HR UNMNTR: CPT | Performed by: PSYCHIATRY & NEUROLOGY

## 2020-05-22 PROCEDURE — A9270 NON-COVERED ITEM OR SERVICE: HCPCS | Performed by: INTERNAL MEDICINE

## 2020-05-22 PROCEDURE — 94770 HCHG CO2 EXPIRED GAS DETERMINATION: CPT

## 2020-05-22 PROCEDURE — 700111 HCHG RX REV CODE 636 W/ 250 OVERRIDE (IP): Performed by: PSYCHIATRY & NEUROLOGY

## 2020-05-22 PROCEDURE — 700102 HCHG RX REV CODE 250 W/ 637 OVERRIDE(OP): Performed by: PSYCHIATRY & NEUROLOGY

## 2020-05-22 PROCEDURE — 700111 HCHG RX REV CODE 636 W/ 250 OVERRIDE (IP): Performed by: FAMILY MEDICINE

## 2020-05-22 PROCEDURE — 80048 BASIC METABOLIC PNL TOTAL CA: CPT

## 2020-05-22 PROCEDURE — 82803 BLOOD GASES ANY COMBINATION: CPT

## 2020-05-22 PROCEDURE — 700111 HCHG RX REV CODE 636 W/ 250 OVERRIDE (IP): Performed by: HOSPITALIST

## 2020-05-22 PROCEDURE — 36600 WITHDRAWAL OF ARTERIAL BLOOD: CPT

## 2020-05-22 PROCEDURE — 85007 BL SMEAR W/DIFF WBC COUNT: CPT

## 2020-05-22 RX ORDER — LEVETIRACETAM 500 MG/1
1500 TABLET ORAL 2 TIMES DAILY
Status: DISCONTINUED | OUTPATIENT
Start: 2020-05-22 | End: 2020-06-05 | Stop reason: HOSPADM

## 2020-05-22 RX ORDER — DEXTROSE MONOHYDRATE 25 G/50ML
50 INJECTION, SOLUTION INTRAVENOUS
Status: DISCONTINUED | OUTPATIENT
Start: 2020-05-22 | End: 2020-05-23

## 2020-05-22 RX ORDER — LACOSAMIDE 100 MG/1
300 TABLET ORAL 2 TIMES DAILY
Status: DISCONTINUED | OUTPATIENT
Start: 2020-05-22 | End: 2020-06-05 | Stop reason: HOSPADM

## 2020-05-22 RX ADMIN — DEXAMETHASONE SODIUM PHOSPHATE 2 MG: 4 INJECTION, SOLUTION INTRA-ARTICULAR; INTRALESIONAL; INTRAMUSCULAR; INTRAVENOUS; SOFT TISSUE at 05:28

## 2020-05-22 RX ADMIN — DEXAMETHASONE SODIUM PHOSPHATE 2 MG: 4 INJECTION, SOLUTION INTRA-ARTICULAR; INTRALESIONAL; INTRAMUSCULAR; INTRAVENOUS; SOFT TISSUE at 17:50

## 2020-05-22 RX ADMIN — SCOPALAMINE 1 PATCH: 1 PATCH, EXTENDED RELEASE TRANSDERMAL at 11:44

## 2020-05-22 RX ADMIN — AMPICILLIN SODIUM AND SULBACTAM SODIUM 3 G: 2; 1 INJECTION, POWDER, FOR SOLUTION INTRAMUSCULAR; INTRAVENOUS at 05:24

## 2020-05-22 RX ADMIN — TOPIRAMATE 200 MG: 100 TABLET, FILM COATED ORAL at 05:31

## 2020-05-22 RX ADMIN — ENOXAPARIN SODIUM 40 MG: 100 INJECTION SUBCUTANEOUS at 05:27

## 2020-05-22 RX ADMIN — POTASSIUM BICARBONATE 50 MEQ: 978 TABLET, EFFERVESCENT ORAL at 10:36

## 2020-05-22 RX ADMIN — AMPICILLIN SODIUM AND SULBACTAM SODIUM 3 G: 2; 1 INJECTION, POWDER, FOR SOLUTION INTRAMUSCULAR; INTRAVENOUS at 11:45

## 2020-05-22 RX ADMIN — FENTANYL CITRATE 100 MCG: 50 INJECTION INTRAMUSCULAR; INTRAVENOUS at 07:27

## 2020-05-22 RX ADMIN — HYDRALAZINE HYDROCHLORIDE 10 MG: 20 INJECTION INTRAMUSCULAR; INTRAVENOUS at 02:08

## 2020-05-22 RX ADMIN — VALPROATE SODIUM 750 MG: 100 INJECTION, SOLUTION INTRAVENOUS at 10:33

## 2020-05-22 RX ADMIN — DOCUSATE SODIUM 50 MG AND SENNOSIDES 8.6 MG 2 TABLET: 8.6; 5 TABLET, FILM COATED ORAL at 05:22

## 2020-05-22 RX ADMIN — FENTANYL CITRATE 200 MCG: 50 INJECTION INTRAMUSCULAR; INTRAVENOUS at 22:40

## 2020-05-22 RX ADMIN — FENTANYL CITRATE 100 MCG: 50 INJECTION INTRAMUSCULAR; INTRAVENOUS at 22:12

## 2020-05-22 RX ADMIN — FAMOTIDINE 20 MG: 20 TABLET ORAL at 05:22

## 2020-05-22 RX ADMIN — DOCUSATE SODIUM 50 MG AND SENNOSIDES 8.6 MG 2 TABLET: 8.6; 5 TABLET, FILM COATED ORAL at 17:50

## 2020-05-22 RX ADMIN — SODIUM CHLORIDE 300 MG: 9 INJECTION, SOLUTION INTRAVENOUS at 13:06

## 2020-05-22 RX ADMIN — LACOSAMIDE 300 MG: 100 TABLET, FILM COATED ORAL at 18:09

## 2020-05-22 RX ADMIN — FUROSEMIDE 40 MG: 10 INJECTION, SOLUTION INTRAMUSCULAR; INTRAVENOUS at 05:28

## 2020-05-22 RX ADMIN — FAMOTIDINE 20 MG: 20 TABLET ORAL at 17:51

## 2020-05-22 RX ADMIN — LEVETIRACETAM 1500 MG: 500 TABLET ORAL at 17:51

## 2020-05-22 RX ADMIN — AMLODIPINE BESYLATE 5 MG: 5 TABLET ORAL at 05:22

## 2020-05-22 RX ADMIN — TOPIRAMATE 200 MG: 100 TABLET, FILM COATED ORAL at 17:51

## 2020-05-22 RX ADMIN — Medication 100 MCG/HR: at 20:14

## 2020-05-22 RX ADMIN — VALPROIC ACID 750 MG: 250 SOLUTION ORAL at 20:22

## 2020-05-22 RX ADMIN — LEVETIRACETAM 1500 MG: 100 INJECTION, SOLUTION, CONCENTRATE INTRAVENOUS at 05:58

## 2020-05-22 RX ADMIN — LOSARTAN POTASSIUM 100 MG: 50 TABLET, FILM COATED ORAL at 17:51

## 2020-05-22 NOTE — PROCEDURES
VIDEO ELECTROENCEPHALOGRAM REPORT        Referring provider: Dr. Strong.      DOS: 5/22/2020 (total recording of 23 hours and 29 minutes).      INDICATION:  Ahmet Escobedo 60 y.o. male presenting with h/o brain tumor, seizures.      CURRENT ANTIEPILEPTIC REGIMEN: Levetiracetam 1500 mg q 12 hrs, Lacosamide 300 mg q 12 hrs, Valproic Acid 750 mg q 12 hrs, and Topiramate 200 mg q 12 hrs. Mildly sedated with Fentanyl.      TECHNIQUE: 30 channel video electroencephalogram (EEG) was performed in accordance with the international 10-20 system. The study was reviewed in bipolar and referential montages. The recording examined a mildly sedated patient with brief arousals.      DESCRIPTION OF THE RECORD:  Waxing and waning of the cerebral electrical activity. There is slowing in the left hemisphere. Continuous left posterior quadrant spikes/polyspikes and sharps noted with frequent but brief runs of left lateralized periodic discharges. No clear seizures captured.      ACTIVATION PROCEDURES:   Not performed.      EKG: sampling of the EKG recording demonstrated sinus rhythm.      EVENTS: None.      INTERPRETATION:  This is an abnormal video EEG recording in a sedated patient. There is slowing in the left hemisphere. There is slowing in the left hemisphere. Continuous left posterior quadrant spikes/polyspikes and sharps noted with frequent but brief runs of left lateralized periodic discharges. No clear seizures captured.  The patient remains at high risk for seizure recurrence. The findings suggest a large underlying area of cortical irritability and structural abnormality. Clinical and radiological correlation is recommended.     Updates provided to Dr. Strong, and Dr. Jones.            Davion Wise MD   Epilepsy and Neurodiagnostics.   Clinical  of Neurology [facility].   Diplomate in Neurology, Epilepsy, and Electrodiagnostic Medicine.   Office: [phone]  Fax:  [phone]

## 2020-05-22 NOTE — DIETARY
Nutrition Support Weekly Update: Day 8 of admit.  Ahmet Escobedo is a 60 y.o. male with admitting DX of Seizure. Tube feeding initiated on 5/16. Current TF via Gastric Cortrak is Peptamen Intense VHP @ 60 ml/hr, providing 1440 kcals, 132 grams protein, 1210 mL free water per day.      Assessment:  Wt 5/19: 95 kg via bed scale - slight increase since admit suspect related to fluids and/or items on bed when weighed.     Evaluation:   1. TF running @ goal. Pt remains intubated.  2. Pt with generalized; 2+ edema in RUE, RLE, LUE and NINI - moderate fluid accumulation. Pt also with 2+;3+ scrotal edema - moderate to severe fluid accumulation.   3. Labs: K 3.4, Glucose 184, BUN 50  4. Meds: norvasc, unasyn, decadron, lovenox, pepcid, lasix, keppra, electrolyte replacement, pericolace, topamax, depacon  5. Last BM: 5/19  6. Current feeding remains appropriate at this time.      Malnutrition Risk: Pt noted with moderate to severe fluid accumulation, no other criteria noted at this time.     Recommendations/Plan:  1. Continue TF formula and rate   2. Fluids per MD  3. Monitor weight.      RD following

## 2020-05-22 NOTE — PROGRESS NOTES
Critical Care Progress Note    Date of admission  5/13/2020    Chief Complaint  59yo male with hx of GBM s/p resection in 2/2020, hx of seizures now admitted for recurrent seizures. Pt is on keppra 750mg PO BID and low dose dexamethasone 0.25mg PO daily at home. He presented after having right sided convulsive movements and slurred speech.      Pt was started on topamax, and valproate, and vimpat since admission. Keppra dose was increased to 1500 Q12H. On dexamethasone 4 Q12H. Given ativan of total 6mg since admission. Pt is somnolent and given the quick escalation of his antiseizure meds to control his seizures, pt's transferred to ICU for further management.  Upon ICU arrival pt was obtunded       Hospital Course          Interval Problem Update  Reviewed last 24 hour events:  - No sz on EEG overnight    - Neuro: GCS    - HR: 50- 100s   - SBP: 120-30s   - GI TF at goal   - UOP: 3.8L/24h   - Novak: yes   - Tm: 37.2   - ABG: Reviewed   - CXR (personally reviewed and compared to prior)bibasilar atelectasis. No pneumothorax. No obvious pleural effusion   - Current titration of a ketamine and prop gtts   - Replete K+       AEDs  Keppra  Vimpat  Topamax  Depacon      Review of Systems  Review of Systems   Unable to perform ROS: Intubated        Vital Signs for last 24 hours   Pulse:  [] 101  Resp:  [12-35] 25  BP: (118-190)/() 125/77  SpO2:  [84 %-100 %] 97 %    Hemodynamic parameters for last 24 hours       Respiratory Information for the last 24 hours  Vent Mode: ASV  Rate (breaths/min): 16  PEEP/CPAP: 8  MAP: 12  Control VTE (exp VT): 833    Physical Exam   Physical Exam  Vitals signs and nursing note reviewed.   Constitutional:       General: He is not in acute distress.     Appearance: He is ill-appearing. He is not toxic-appearing or diaphoretic.   HENT:      Head: Normocephalic and atraumatic.      Right Ear: External ear normal.      Left Ear: External ear normal.      Nose: Nose normal.       Mouth/Throat:      Mouth: Mucous membranes are moist.      Pharynx: Oropharynx is clear. No oropharyngeal exudate or posterior oropharyngeal erythema.   Eyes:      General: No scleral icterus.        Right eye: No discharge.         Left eye: No discharge.      Extraocular Movements: Extraocular movements intact.      Conjunctiva/sclera: Conjunctivae normal.      Pupils: Pupils are equal, round, and reactive to light.   Neck:      Musculoskeletal: Normal range of motion.   Cardiovascular:      Rate and Rhythm: Normal rate and regular rhythm.      Pulses: Normal pulses.      Heart sounds: Normal heart sounds. No murmur.   Pulmonary:      Breath sounds: No stridor. No wheezing, rhonchi or rales.      Comments: On ventilator  Abdominal:      General: There is no distension.      Tenderness: There is no abdominal tenderness.   Musculoskeletal:         General: No swelling or tenderness.      Right lower leg: No edema.      Left lower leg: No edema.   Skin:     Coloration: Skin is not jaundiced.      Findings: No rash.   Neurological:      Mental Status: He is alert.      Cranial Nerves: No cranial nerve deficit.      Comments: GCS 10t [id number] Pupils reactive. Follows commands. Squeezes hands moves toes. Stick out tongue. Weak neck flexors.         Medications  Current Facility-Administered Medications   Medication Dose Route Frequency Provider Last Rate Last Dose   • dexamethasone (DECADRON) injection 2 mg  2 mg Intravenous Q12HRS Nik Jones M.D.   2 mg at 05/22/20 0528   • fentaNYL (SUBLIMAZE) injection 100 mcg  100 mcg Intravenous Q15 MIN PRN Nik Jones M.D.   100 mcg at 05/22/20 0727    And   • fentaNYL (SUBLIMAZE) injection 200 mcg  200 mcg Intravenous Q15 MIN PRN Nik Jones M.D.        And   • fentaNYL (SUBLIMAZE) 50 mcg/mL in 50mL (Continuous Infusion)   Intravenous Continuous Nik Jones M.D. 2 mL/hr at 05/21/20 1911 100 mcg/hr at 05/21/20 1911    And   • Dexmedetomidine HCl-Dextrose  200MCG/50ML -5% SOLN  0-1.5 mcg/kg/hr Intravenous Continuous Nik Jones M.D.   Stopped at 05/21/20 0945   • amLODIPine (NORVASC) tablet 5 mg  5 mg Enteral Tube Q DAY Nik Jones M.D.   5 mg at 05/22/20 0522   • scopolamine (TRANSDERM-SCOP) patch 1 Patch  1 Patch Transdermal Q72HRS Nik Jones M.D.   1 Patch at 05/19/20 1304   • furosemide (LASIX) injection 40 mg  40 mg Intravenous Q DAY Colton Perez M.D.   40 mg at 05/22/20 0528   • enoxaparin (LOVENOX) inj 40 mg  40 mg Subcutaneous DAILY Colton Perez M.D.   40 mg at 05/22/20 0527   • ampicillin/sulbactam (UNASYN) 3 g in  mL IVPB  3 g Intravenous Q6HRS Colton Perez M.D.   Stopped at 05/22/20 0554   • Pharmacy Consult: Enteral tube insertion - review meds/change route/product selection  1 Each Other PHARMACY TO DOSE Colton Perez M.D.       • losartan (COZAAR) tablet 100 mg  100 mg Enteral Tube DAILY Colton Perez M.D.   100 mg at 05/21/20 1715   • topiramate (TOPAMAX) tablet 200 mg  200 mg Enteral Tube Q12HRS Colton Perez M.D.   200 mg at 05/22/20 0531   • acetaminophen (TYLENOL) tablet 650 mg  650 mg Enteral Tube Q6HRS PRN Colton Perez M.D.   650 mg at 05/20/20 1937   • ondansetron (ZOFRAN ODT) dispertab 4 mg  4 mg Enteral Tube Q4HRS PRN Colton Perez M.D.       • promethazine (PHENERGAN) tablet 12.5-25 mg  12.5-25 mg Enteral Tube Q4HRS PRN Colton Perez M.D.       • Pharmacy Consult Request ...Pain Management Review 1 Each  1 Each Other PHARMACY TO DOSE Colton Perez M.D.        And   • oxyCODONE immediate-release (ROXICODONE) tablet 2.5 mg  2.5 mg Enteral Tube Q3HRS PRN Colton Perez M.D.        And   • oxyCODONE immediate-release (ROXICODONE) tablet 5 mg  5 mg Enteral Tube Q3HRS PRN Colton Perez M.D.       • lacosamide (VIMPAT) 300 mg in  mL ivpb  300 mg Intravenous Q12HRS Jass Velazquez M.D.   Stopped at 05/21/20 211   • Respiratory Therapy Consult   Nebulization Continuous RT Nikhil Erickson D.O.       •  ipratropium-albuterol (DUONEB) nebulizer solution  3 mL Nebulization Q2HRS PRN (RT) Nikhil Erickson D.O.       • famotidine (PEPCID) tablet 20 mg  20 mg Enteral Tube Q12HRS NICK ReddyO.   20 mg at 05/22/20 0522   • senna-docusate (PERICOLACE or SENOKOT S) 8.6-50 MG per tablet 2 Tab  2 Tab Enteral Tube BID NICK ReddyO.   2 Tab at 05/22/20 0522    And   • polyethylene glycol/lytes (MIRALAX) PACKET 1 Packet  1 Packet Enteral Tube QDAY PRN NICK ReddyO.   1 Packet at 05/17/20 1105    And   • magnesium hydroxide (MILK OF MAGNESIA) suspension 30 mL  30 mL Enteral Tube QDAY PRN Nikhil Erickson D.O.        And   • bisacodyl (DULCOLAX) suppository 10 mg  10 mg Rectal QDAY PRN Nikhil Erickson D.O.       • MD Alert...ICU Electrolyte Replacement per Pharmacy   Other PHARMACY TO DOSE Nikhil Erickson D.O.       • lidocaine (XYLOCAINE) 1 % injection 1-2 mL  1-2 mL Tracheal Tube Q30 MIN PRN Nikhil Erickson D.O.       • ondansetron (ZOFRAN) syringe/vial injection 4 mg  4 mg Intravenous Q4HRS PRN Igor Carlos M.D.       • promethazine (PHENERGAN) suppository 12.5-25 mg  12.5-25 mg Rectal Q4HRS PRN Igor Carlos M.D.       • prochlorperazine (COMPAZINE) injection 5-10 mg  5-10 mg Intravenous Q4HRS PRN Igor Carlos M.D.       • LORazepam (ATIVAN) injection 4 mg  4 mg Intravenous Q10 MIN PRN Igor Carlos M.D.   4 mg at 05/15/20 1545   • levETIRAcetam (KEPPRA) 1,500 mg in  mL IVPB  1,500 mg Intravenous Q12HRS Igor Carlos M.D.   Stopped at 05/22/20 0613   • hydrALAZINE (APRESOLINE) injection 10 mg  10 mg Intravenous Q6HRS PRN Guido Pierre M.D.   10 mg at 05/22/20 0208   • valproate (DEPACON) 750 mg in  mL IVPB  750 mg Intravenous BID Jass Velazquez M.D.   Stopped at 05/21/20 2229       Fluids    Intake/Output Summary (Last 24 hours) at 5/22/2020 0801  Last data filed at 5/22/2020 0600  Gross per 24 hour   Intake 1990 ml   Output 1800 ml   Net 190 ml       Laboratory  Recent Labs     05/20/20  0343 05/21/20  0403  05/22/20  0453   ISTATAPH 7.391* 7.429 7.419   ISTATAPCO2 37.7* 31.0 29.9   ISTATAPO2 84 95* 89*   ISTATATCO2 24 21 20   GNCEMVI0AAN 96 98 97   ISTATARTHCO3 22.9 20.5 19.3   ISTATARTBE -2 -3 -4   ISTATTEMP 98.2 F 98.8 F 98.6 F   ISTATFIO2 30 30 30   ISTATSPEC Arterial Arterial Arterial   ISTATAPHTC 7.395* 7.427 7.419   [id number] 83 96* 89*         Recent Labs     05/20/20  0332 05/21/20  0330 05/21/20  0835 05/22/20  0500   SODIUM 149* 147*  --  140   POTASSIUM 3.8 3.5*  --  3.4*   CHLORIDE 117* 114*  --  110   CO2 23 21  --  20   BUN 34* 46*  --  50*   CREATININE 0.79 0.79  --  0.68   MAGNESIUM  --   --  2.1  --    CALCIUM 8.6 8.7  --  8.9     Recent Labs     05/20/20  0332 05/21/20  0330 05/22/20  0500   GLUCOSE 151* 170* 184*     Recent Labs     05/20/20  0332 05/21/20  0330 05/22/20  0415   WBC 9.0 12.5* 10.3   NEUTSPOLYS 86.10* 70.40 61.20   LYMPHOCYTES 7.80* 7.80* 12.90*   MONOCYTES 3.50 9.60 8.60   EOSINOPHILS 1.70 0.00 1.70   BASOPHILS 0.00 0.90 0.90     Recent Labs     05/20/20  0332 05/21/20  0330 05/22/20  0415   RBC 3.79* 4.00* 4.11*   HEMOGLOBIN 11.8* 12.4* 12.7*   HEMATOCRIT 37.3* 39.0* 40.5*   PLATELETCT 158* 173 164       Imaging  X-Ray:  I have personally reviewed the images and compared with prior images.  EKG:  I have personally reviewed the images and compared with prior images.  CT:    Reviewed  MRI:   Reviewed    Assessment/Plan  * Status epilepticus (HCC)- (present on admission)  Assessment & Plan  Refractory  Continue keppra, valproate, topamax and vimpat  Now off Ketamine and Propofol >24 hours  Monitor EEG for any evidence or recurrence for 24 more hours  Fentanyl gtt for vent synchrony and comfort until able to liberate from the MV    Acute respiratory failure with hypoxia (HCC)  Assessment & Plan  Intubated for airway protection given drip medication required for seizure control  sbt once seizures resolved and able to wean sedation  RT/O2 protocols  Daily and PRN ABGs  Titration of  ventilator therapy based on ABGs and patient's status  Sedation as tolerated/indicated  Daily CXR  HOB >30 degrees and peridex for VAP prevention  Pepcid for GI prophylaxis  SAT/SBT when able (ABCDEF Bundle)    Now off sedation. Plan for SBT today, monitor for liberation from MV    GBM (glioblastoma multiforme) (HCC)- (present on admission)  Assessment & Plan  S/p resection in 2/2020 at [facility]  Pt is undergoing chemo/radiation  On dexamethasone 2mg IV Q12H; will continue to wean as able    Awaiting MRI results from his other institution as MRI here may be suggestive of some recurrence  Will need follow up with his NeuroOnc team after discharge from the ICU and hospital    Hyperglycemia- (present on admission)  Assessment & Plan  ssi if necessary  On steroids    Leukocytosis- (present on admission)  Assessment & Plan  ? Aspiration  On antibiotics    Hypokalemia- (present on admission)  Assessment & Plan  Goal > 4    Hyponatremia  Assessment & Plan  Monitor   goal 135-145    Essential hypertension- (present on admission)  Assessment & Plan  Keep sbp < 160        VTE:  Contraindicated  Ulcer: H2 Antagonist  Lines: Central Line  Ongoing indication addressed and Novak Catheter  Ongoing indication addressed    I have performed a physical exam and reviewed and updated ROS and Plan today (5/22/2020). In review of yesterday's note (5/21/2020), there are no changes except as documented above.     Discussed patient condition and risk of morbidity and/or mortality with Family, RN, RT, Pharmacy, Code status disscussed, Charge nurse / hot rounds and neurology     I have assessed and reassessed his respiratory status with ventilator adjustments and spontaneous breathing trials, airway mechanics, ventilator waveforms, blood pressure, hemodynamics, cardiovascular status with titration of a fentanyl gtt and his neurologic status.  He is at increased risk for worsening respiratory, cardiovascular and CNS  Dysfunction.    Critical care  time = 34 minutes in directly providing and coordinating critical care and extensive data review.  No time overlap and excludes procedures.

## 2020-05-23 ENCOUNTER — APPOINTMENT (OUTPATIENT)
Dept: RADIOLOGY | Facility: MEDICAL CENTER | Age: 60
DRG: 100 | End: 2020-05-23
Attending: PSYCHIATRY & NEUROLOGY
Payer: OTHER MISCELLANEOUS

## 2020-05-23 ENCOUNTER — APPOINTMENT (OUTPATIENT)
Dept: RADIOLOGY | Facility: MEDICAL CENTER | Age: 60
DRG: 100 | End: 2020-05-23
Attending: INTERNAL MEDICINE
Payer: OTHER MISCELLANEOUS

## 2020-05-23 LAB
ACTION RANGE TRIGGERED IACRT: NO
ANION GAP SERPL CALC-SCNC: 9 MMOL/L (ref 7–16)
ANISOCYTOSIS BLD QL SMEAR: ABNORMAL
BASE EXCESS BLDA CALC-SCNC: -5 MMOL/L (ref -4–3)
BASOPHILS # BLD AUTO: 0 % (ref 0–1.8)
BASOPHILS # BLD: 0 K/UL (ref 0–0.12)
BODY TEMPERATURE: ABNORMAL DEGREES
BUN SERPL-MCNC: 55 MG/DL (ref 8–22)
CALCIUM SERPL-MCNC: 9 MG/DL (ref 8.5–10.5)
CHLORIDE SERPL-SCNC: 119 MMOL/L (ref 96–112)
CO2 BLDA-SCNC: 21 MMOL/L (ref 20–33)
CO2 SERPL-SCNC: 21 MMOL/L (ref 20–33)
CREAT SERPL-MCNC: 0.74 MG/DL (ref 0.5–1.4)
EOSINOPHIL # BLD AUTO: 0 K/UL (ref 0–0.51)
EOSINOPHIL NFR BLD: 0 % (ref 0–6.9)
ERYTHROCYTE [DISTWIDTH] IN BLOOD BY AUTOMATED COUNT: 59.5 FL (ref 35.9–50)
GLUCOSE BLD-MCNC: 172 MG/DL (ref 65–99)
GLUCOSE BLD-MCNC: 189 MG/DL (ref 65–99)
GLUCOSE BLD-MCNC: 203 MG/DL (ref 65–99)
GLUCOSE BLD-MCNC: 228 MG/DL (ref 65–99)
GLUCOSE SERPL-MCNC: 203 MG/DL (ref 65–99)
HCO3 BLDA-SCNC: 19.8 MMOL/L (ref 17–25)
HCT VFR BLD AUTO: 40.6 % (ref 42–52)
HGB BLD-MCNC: 12.5 G/DL (ref 14–18)
HOROWITZ INDEX BLDA+IHG-RTO: 377 MM[HG]
INST. QUALIFIED PATIENT IIQPT: YES
LYMPHOCYTES # BLD AUTO: 1.61 K/UL (ref 1–4.8)
LYMPHOCYTES NFR BLD: 16.4 % (ref 22–41)
MACROCYTES BLD QL SMEAR: ABNORMAL
MANUAL DIFF BLD: NORMAL
MCH RBC QN AUTO: 30.9 PG (ref 27–33)
MCHC RBC AUTO-ENTMCNC: 30.8 G/DL (ref 33.7–35.3)
MCV RBC AUTO: 100.5 FL (ref 81.4–97.8)
METAMYELOCYTES NFR BLD MANUAL: 1.7 %
MONOCYTES # BLD AUTO: 0.93 K/UL (ref 0–0.85)
MONOCYTES NFR BLD AUTO: 9.5 % (ref 0–13.4)
MORPHOLOGY BLD-IMP: NORMAL
MYELOCYTES NFR BLD MANUAL: 0.9 %
NEUTROPHILS # BLD AUTO: 7.01 K/UL (ref 1.82–7.42)
NEUTROPHILS NFR BLD: 69.8 % (ref 44–72)
NEUTS BAND NFR BLD MANUAL: 1.7 % (ref 0–10)
NRBC # BLD AUTO: 0 K/UL
NRBC BLD-RTO: 0 /100 WBC
O2/TOTAL GAS SETTING VFR VENT: 30 %
PCO2 BLDA: 33.2 MMHG (ref 26–37)
PCO2 TEMP ADJ BLDA: 32.8 MMHG (ref 26–37)
PH BLDA: 7.38 [PH] (ref 7.4–7.5)
PH TEMP ADJ BLDA: 7.39 [PH] (ref 7.4–7.5)
PLATELET # BLD AUTO: 162 K/UL (ref 164–446)
PLATELET BLD QL SMEAR: NORMAL
PMV BLD AUTO: 10.4 FL (ref 9–12.9)
PO2 BLDA: 113 MMHG (ref 64–87)
PO2 TEMP ADJ BLDA: 111 MMHG (ref 64–87)
POTASSIUM SERPL-SCNC: 3.8 MMOL/L (ref 3.6–5.5)
RBC # BLD AUTO: 4.04 M/UL (ref 4.7–6.1)
RBC BLD AUTO: PRESENT
SAO2 % BLDA: 98 % (ref 93–99)
SODIUM SERPL-SCNC: 149 MMOL/L (ref 135–145)
SPECIMEN DRAWN FROM PATIENT: ABNORMAL
WBC # BLD AUTO: 9.8 K/UL (ref 4.8–10.8)

## 2020-05-23 PROCEDURE — A9270 NON-COVERED ITEM OR SERVICE: HCPCS | Performed by: INTERNAL MEDICINE

## 2020-05-23 PROCEDURE — 700102 HCHG RX REV CODE 250 W/ 637 OVERRIDE(OP): Performed by: INTERNAL MEDICINE

## 2020-05-23 PROCEDURE — 85027 COMPLETE CBC AUTOMATED: CPT

## 2020-05-23 PROCEDURE — 94003 VENT MGMT INPAT SUBQ DAY: CPT

## 2020-05-23 PROCEDURE — 99291 CRITICAL CARE FIRST HOUR: CPT | Performed by: PSYCHIATRY & NEUROLOGY

## 2020-05-23 PROCEDURE — 770022 HCHG ROOM/CARE - ICU (200)

## 2020-05-23 PROCEDURE — 80048 BASIC METABOLIC PNL TOTAL CA: CPT

## 2020-05-23 PROCEDURE — 700101 HCHG RX REV CODE 250: Performed by: INTERNAL MEDICINE

## 2020-05-23 PROCEDURE — 71045 X-RAY EXAM CHEST 1 VIEW: CPT

## 2020-05-23 PROCEDURE — 82962 GLUCOSE BLOOD TEST: CPT | Mod: 91

## 2020-05-23 PROCEDURE — 36600 WITHDRAWAL OF ARTERIAL BLOOD: CPT

## 2020-05-23 PROCEDURE — 700102 HCHG RX REV CODE 250 W/ 637 OVERRIDE(OP): Performed by: PSYCHIATRY & NEUROLOGY

## 2020-05-23 PROCEDURE — 82803 BLOOD GASES ANY COMBINATION: CPT

## 2020-05-23 PROCEDURE — 302135 SEQUENTIAL COMPRESSION MACHINE: Performed by: INTERNAL MEDICINE

## 2020-05-23 PROCEDURE — 700111 HCHG RX REV CODE 636 W/ 250 OVERRIDE (IP): Performed by: PSYCHIATRY & NEUROLOGY

## 2020-05-23 PROCEDURE — 94770 HCHG CO2 EXPIRED GAS DETERMINATION: CPT

## 2020-05-23 PROCEDURE — 85007 BL SMEAR W/DIFF WBC COUNT: CPT

## 2020-05-23 PROCEDURE — 700111 HCHG RX REV CODE 636 W/ 250 OVERRIDE (IP): Performed by: INTERNAL MEDICINE

## 2020-05-23 PROCEDURE — A9270 NON-COVERED ITEM OR SERVICE: HCPCS | Performed by: PSYCHIATRY & NEUROLOGY

## 2020-05-23 RX ORDER — DEXTROSE MONOHYDRATE 25 G/50ML
50 INJECTION, SOLUTION INTRAVENOUS
Status: DISCONTINUED | OUTPATIENT
Start: 2020-05-23 | End: 2020-05-27

## 2020-05-23 RX ADMIN — DOCUSATE SODIUM 50 MG AND SENNOSIDES 8.6 MG 2 TABLET: 8.6; 5 TABLET, FILM COATED ORAL at 18:58

## 2020-05-23 RX ADMIN — DEXAMETHASONE SODIUM PHOSPHATE 2 MG: 4 INJECTION, SOLUTION INTRA-ARTICULAR; INTRALESIONAL; INTRAMUSCULAR; INTRAVENOUS; SOFT TISSUE at 05:48

## 2020-05-23 RX ADMIN — FAMOTIDINE 20 MG: 20 TABLET ORAL at 05:48

## 2020-05-23 RX ADMIN — LEVETIRACETAM 1500 MG: 500 TABLET ORAL at 18:58

## 2020-05-23 RX ADMIN — LEVETIRACETAM 1500 MG: 500 TABLET ORAL at 05:48

## 2020-05-23 RX ADMIN — DEXAMETHASONE SODIUM PHOSPHATE 2 MG: 4 INJECTION, SOLUTION INTRA-ARTICULAR; INTRALESIONAL; INTRAMUSCULAR; INTRAVENOUS; SOFT TISSUE at 17:57

## 2020-05-23 RX ADMIN — Medication 100 MCG/HR: at 20:48

## 2020-05-23 RX ADMIN — DOCUSATE SODIUM 50 MG AND SENNOSIDES 8.6 MG 2 TABLET: 8.6; 5 TABLET, FILM COATED ORAL at 05:48

## 2020-05-23 RX ADMIN — INSULIN HUMAN 1 UNITS: 100 INJECTION, SOLUTION PARENTERAL at 11:42

## 2020-05-23 RX ADMIN — FAMOTIDINE 20 MG: 20 TABLET ORAL at 18:58

## 2020-05-23 RX ADMIN — INSULIN HUMAN 2 UNITS: 100 INJECTION, SOLUTION PARENTERAL at 06:21

## 2020-05-23 RX ADMIN — INSULIN HUMAN 1 UNITS: 100 INJECTION, SOLUTION PARENTERAL at 17:59

## 2020-05-23 RX ADMIN — VALPROIC ACID 750 MG: 250 SOLUTION ORAL at 20:31

## 2020-05-23 RX ADMIN — FUROSEMIDE 40 MG: 10 INJECTION, SOLUTION INTRAMUSCULAR; INTRAVENOUS at 05:48

## 2020-05-23 RX ADMIN — LACOSAMIDE 300 MG: 100 TABLET, FILM COATED ORAL at 20:30

## 2020-05-23 RX ADMIN — AMLODIPINE BESYLATE 5 MG: 5 TABLET ORAL at 06:00

## 2020-05-23 RX ADMIN — LACOSAMIDE 300 MG: 100 TABLET, FILM COATED ORAL at 05:52

## 2020-05-23 RX ADMIN — VALPROIC ACID 750 MG: 250 SOLUTION ORAL at 05:49

## 2020-05-23 RX ADMIN — TOPIRAMATE 200 MG: 100 TABLET, FILM COATED ORAL at 05:49

## 2020-05-23 RX ADMIN — POTASSIUM BICARBONATE 50 MEQ: 978 TABLET, EFFERVESCENT ORAL at 05:52

## 2020-05-23 RX ADMIN — FENTANYL CITRATE 100 MCG: 50 INJECTION INTRAMUSCULAR; INTRAVENOUS at 01:39

## 2020-05-23 RX ADMIN — LOSARTAN POTASSIUM 100 MG: 50 TABLET, FILM COATED ORAL at 18:58

## 2020-05-23 RX ADMIN — POTASSIUM BICARBONATE 25 MEQ: 978 TABLET, EFFERVESCENT ORAL at 11:37

## 2020-05-23 RX ADMIN — POLYETHYLENE GLYCOL 3350 1 PACKET: 17 POWDER, FOR SOLUTION ORAL at 06:48

## 2020-05-23 RX ADMIN — ENOXAPARIN SODIUM 40 MG: 100 INJECTION SUBCUTANEOUS at 05:49

## 2020-05-23 RX ADMIN — TOPIRAMATE 200 MG: 100 TABLET, FILM COATED ORAL at 18:59

## 2020-05-23 NOTE — CARE PLAN
Problem: Communication  Goal: The ability to communicate needs accurately and effectively will improve  Outcome: PROGRESSING AS EXPECTED      Patient unable to verbalize needs due to intubation, but does follow commands. RN to provide yes/no questions to patient to communicate needs and wants.     Problem: Safety  Goal: Will remain free from falls  Outcome: PROGRESSING AS EXPECTED    Appropriate fall precautions, based on fall risk assessment, in place.

## 2020-05-23 NOTE — PROGRESS NOTES
Critical Care Progress Note    Date of admission  5/13/2020    Chief Complaint  59yo male with hx of GBM s/p resection in 2/2020, hx of seizures now admitted for recurrent seizures. Pt is on keppra 750mg PO BID and low dose dexamethasone 0.25mg PO daily at home. He presented after having right sided convulsive movements and slurred speech.      Pt was started on topamax, and valproate, and vimpat since admission. Keppra dose was increased to 1500 Q12H. On dexamethasone 4 Q12H. Given ativan of total 6mg since admission. Pt is somnolent and given the quick escalation of his antiseizure meds to control his seizures, pt's transferred to ICU for further management.  Upon ICU arrival pt was obtunded       Hospital Course          Interval Problem Update  Reviewed last 24 hour events:  - No sz on EEG overnight   - Neuro: GCS 10t [id number]   - HR: 50- 100s   - SBP: 120s   - GI TF at goal   - UOP: 3.1L/24h   - Novak: yes   - Tm: 37.3   - ABG: Reviewed   - CXR (personally reviewed and compared to prior)bibasilar atelectasis.small amount of  bilateral pulmonary edema. No obvious effusion   - Current titration of fentanyl   - Replete K+       AEDs  Keppra  Vimpat  Topamax  Depacon      Review of Systems  Review of Systems   Unable to perform ROS: Intubated        Vital Signs for last 24 hours   Pulse:  [58-89] 67  Resp:  [9-27] 21  BP: (116-161)/(61-88) 122/73  SpO2:  [96 %-100 %] 99 %    Hemodynamic parameters for last 24 hours       Respiratory Information for the last 24 hours  Vent Mode: ASV  Rate (breaths/min): 16  Vt Target (mL): 440  PEEP/CPAP: 8  P Support: 5  MAP: 9.6  Length of Weaning Trial (Hours): 1  Control VTE (exp VT): 662    Physical Exam   Physical Exam  Vitals signs and nursing note reviewed.   Constitutional:       General: He is not in acute distress.     Appearance: He is ill-appearing. He is not toxic-appearing or diaphoretic.   HENT:      Head: Normocephalic and atraumatic.      Right Ear: External ear  normal.      Left Ear: External ear normal.      Nose: Nose normal.      Mouth/Throat:      Mouth: Mucous membranes are moist.      Pharynx: Oropharynx is clear. No oropharyngeal exudate or posterior oropharyngeal erythema.   Eyes:      General: No scleral icterus.        Right eye: No discharge.         Left eye: No discharge.      Extraocular Movements: Extraocular movements intact.      Conjunctiva/sclera: Conjunctivae normal.      Pupils: Pupils are equal, round, and reactive to light.   Neck:      Musculoskeletal: Normal range of motion.   Cardiovascular:      Rate and Rhythm: Normal rate and regular rhythm.      Pulses: Normal pulses.      Heart sounds: Normal heart sounds. No murmur.   Pulmonary:      Breath sounds: No stridor. No wheezing, rhonchi or rales.      Comments: On ventilator  Abdominal:      General: There is no distension.      Tenderness: There is no abdominal tenderness.   Musculoskeletal:         General: No swelling or tenderness.      Right lower leg: No edema.      Left lower leg: No edema.   Skin:     Coloration: Skin is not jaundiced.      Findings: No rash.   Neurological:      Mental Status: He is alert.      Cranial Nerves: No cranial nerve deficit.      Comments: GCS 10t [id number] Pupils reactive. Follows commands. Squeezes hands moves toes. Wiggles left foot.  Stick out tongue. Weak neck flexors.         Medications  Current Facility-Administered Medications   Medication Dose Route Frequency Provider Last Rate Last Dose   • insulin regular (HUMULIN R) injection 1-6 Units  1-6 Units Subcutaneous Q6HRS Jeremy M Gonda, M.D.   2 Units at 05/23/20 0621    And   • glucose 4 g chewable tablet 16 g  16 g Oral Q15 MIN PRN Jeremy M Gonda, M.D.        And   • dextrose 50% (D50W) injection 50 mL  50 mL Intravenous Q15 MIN PRN Jeremy M Gonda, M.D.       • potassium bicarbonate (KLYTE) effervescent tablet 50 mEq  50 mEq Enteral Tube DAILY Nik Jones M.D.   50 mEq at 05/23/20 0550   • lacosamide  (VIMPAT) tablet 300 mg  300 mg Enteral Tube BID Nik Jones M.D.   300 mg at 05/23/20 0552   • levETIRAcetam (KEPPRA) tablet 1,500 mg  1,500 mg Enteral Tube BID Nik Jones M.D.   1,500 mg at 05/23/20 0548   • Valproate Sodium (DEPAKENE) oral solution 750 mg  750 mg Enteral Tube BID Nik Jones M.D.   750 mg at 05/23/20 0549   • dexamethasone (DECADRON) injection 2 mg  2 mg Intravenous Q12HRS Nik Jones M.D.   2 mg at 05/23/20 0548   • fentaNYL (SUBLIMAZE) injection 100 mcg  100 mcg Intravenous Q15 MIN PRN Nik Jones M.D.   100 mcg at 05/23/20 0139    And   • fentaNYL (SUBLIMAZE) injection 200 mcg  200 mcg Intravenous Q15 MIN PRN Nik Jones M.D.   200 mcg at 05/22/20 2240    And   • fentaNYL (SUBLIMAZE) 50 mcg/mL in 50mL (Continuous Infusion)   Intravenous Continuous Nik Jones M.D. 2 mL/hr at 05/23/20 0704 100 mcg/hr at 05/23/20 0704    And   • Dexmedetomidine HCl-Dextrose 200MCG/50ML -5% SOLN  0-1.5 mcg/kg/hr Intravenous Continuous Nik Jones M.D.   Stopped at 05/21/20 0945   • amLODIPine (NORVASC) tablet 5 mg  5 mg Enteral Tube Q DAY Nik Jones M.D.   5 mg at 05/23/20 0600   • scopolamine (TRANSDERM-SCOP) patch 1 Patch  1 Patch Transdermal Q72HRS Nik Jones M.D.   1 Patch at 05/22/20 1144   • furosemide (LASIX) injection 40 mg  40 mg Intravenous Q DAY Colton Perez M.D.   40 mg at 05/23/20 0548   • enoxaparin (LOVENOX) inj 40 mg  40 mg Subcutaneous DAILY Colton Perez M.D.   40 mg at 05/23/20 0549   • Pharmacy Consult: Enteral tube insertion - review meds/change route/product selection  1 Each Other PHARMACY TO DOSE Colton Perez M.D.       • losartan (COZAAR) tablet 100 mg  100 mg Enteral Tube DAILY Colton Perez M.D.   100 mg at 05/22/20 1751   • topiramate (TOPAMAX) tablet 200 mg  200 mg Enteral Tube Q12HRS Colton Perez M.D.   200 mg at 05/23/20 0596   • acetaminophen (TYLENOL) tablet 650 mg  650 mg Enteral Tube Q6HRS PRN Colton Perez M.D.   650 mg at  05/20/20 1937   • ondansetron (ZOFRAN ODT) dispertab 4 mg  4 mg Enteral Tube Q4HRS PRN Colton Perez M.D.       • promethazine (PHENERGAN) tablet 12.5-25 mg  12.5-25 mg Enteral Tube Q4HRS PRN Colton Perez M.D.       • Pharmacy Consult Request ...Pain Management Review 1 Each  1 Each Other PHARMACY TO DOSE Colton Perez M.D.        And   • oxyCODONE immediate-release (ROXICODONE) tablet 2.5 mg  2.5 mg Enteral Tube Q3HRS PRN Colton Perez M.D.        And   • oxyCODONE immediate-release (ROXICODONE) tablet 5 mg  5 mg Enteral Tube Q3HRS PRN Colton Perez M.D.       • Respiratory Therapy Consult   Nebulization Continuous RT Nikhil Erickson D.O.       • ipratropium-albuterol (DUONEB) nebulizer solution  3 mL Nebulization Q2HRS PRN (RT) Nikhil Erickson D.O.       • famotidine (PEPCID) tablet 20 mg  20 mg Enteral Tube Q12HRS NICK ReddyO.   20 mg at 05/23/20 0548   • senna-docusate (PERICOLACE or SENOKOT S) 8.6-50 MG per tablet 2 Tab  2 Tab Enteral Tube BID BRADY Reddy.O.   2 Tab at 05/23/20 0548    And   • polyethylene glycol/lytes (MIRALAX) PACKET 1 Packet  1 Packet Enteral Tube QDAY PRN NICK ReddyO.   1 Packet at 05/23/20 0648    And   • magnesium hydroxide (MILK OF MAGNESIA) suspension 30 mL  30 mL Enteral Tube QDAY PRN Nikhil Erickson D.O.        And   • bisacodyl (DULCOLAX) suppository 10 mg  10 mg Rectal QDAY PRN NICK ReddyO.       • MD Alert...ICU Electrolyte Replacement per Pharmacy   Other PHARMACY TO DOSE Nikhil Erickson D.O.       • lidocaine (XYLOCAINE) 1 % injection 1-2 mL  1-2 mL Tracheal Tube Q30 MIN PRN NICK ReddyO.       • ondansetron (ZOFRAN) syringe/vial injection 4 mg  4 mg Intravenous Q4HRS PRN Igor Carlos M.D.       • promethazine (PHENERGAN) suppository 12.5-25 mg  12.5-25 mg Rectal Q4HRS PRN Igor Carlos M.D.       • prochlorperazine (COMPAZINE) injection 5-10 mg  5-10 mg Intravenous Q4HRS PRN Igor Carlos M.D.       • LORazepam (ATIVAN) injection 4 mg  4 mg Intravenous Q10 MIN PRN  Igor Carlos M.D.   4 mg at 05/15/20 1545   • hydrALAZINE (APRESOLINE) injection 10 mg  10 mg Intravenous Q6HRS LUL Pierre M.D.   10 mg at 05/22/20 0208       Fluids    Intake/Output Summary (Last 24 hours) at 5/23/2020 0727  Last data filed at 5/23/2020 0600  Gross per 24 hour   Intake 1708 ml   Output 3160 ml   Net -1452 ml       Laboratory  Recent Labs     05/21/20  0403 05/22/20  0453 05/23/20  0306   ISTATAPH 7.429 7.419 7.384*   ISTATAPCO2 31.0 29.9 33.2   ISTATAPO2 95* 89* 113*   ISTATATCO2 21 20 21   HARZHAA0HWW 98 97 98   ISTATARTHCO3 20.5 19.3 19.8   ISTATARTBE -3 -4 -5*   ISTATTEMP 98.8 F 98.6 F 98.1 F   ISTATFIO2 30 30 30   ISTATSPEC Arterial Arterial Arterial   ISTATAPHTC 7.427 7.419 7.388*   UVAEOHZY7RL 96* 89* 111*         Recent Labs     05/21/20  0330 05/21/20  0835 05/22/20  0500 05/23/20  0255   SODIUM 147*  --  140 149*   POTASSIUM 3.5*  --  3.4* 3.8   CHLORIDE 114*  --  110 119*   CO2 21  --  20 21   BUN 46*  --  50* 55*   CREATININE 0.79  --  0.68 0.74   MAGNESIUM  --  2.1  --   --    CALCIUM 8.7  --  8.9 9.0     Recent Labs     05/21/20  0330 05/22/20  0500 05/23/20  0255   GLUCOSE 170* 184* 203*     Recent Labs     05/21/20  0330 05/22/20  0415 05/23/20  0255   WBC 12.5* 10.3 9.8   NEUTSPOLYS 70.40 61.20 69.80   LYMPHOCYTES 7.80* 12.90* 16.40*   MONOCYTES 9.60 8.60 9.50   EOSINOPHILS 0.00 1.70 0.00   BASOPHILS 0.90 0.90 0.00     Recent Labs     05/21/20  0330 05/22/20  0415 05/23/20  0255   RBC 4.00* 4.11* 4.04*   HEMOGLOBIN 12.4* 12.7* 12.5*   HEMATOCRIT 39.0* 40.5* 40.6*   PLATELETCT 173 164 162*       Imaging  X-Ray:  I have personally reviewed the images and compared with prior images.  EKG:  I have personally reviewed the images and compared with prior images.  CT:    Reviewed  MRI:   Reviewed    Assessment/Plan  * Status epilepticus (HCC)- (present on admission)  Assessment & Plan  Refractory  Continue keppra, valproate, topamax and vimpat  Now off Ketamine and Propofol  >48 hours  No sz > 48 d/c EEG  Fentanyl gtt for vent synchrony and comfort until able to liberate from the MV    Acute respiratory failure with hypoxia (HCC)  Assessment & Plan  Intubated for airway protection given drip medication required for seizure control  sbt once seizures resolved and able to wean sedation  RT/O2 protocols  Daily and PRN ABGs  Titration of ventilator therapy based on ABGs and patient's status  Sedation as tolerated/indicated  Daily CXR  HOB >30 degrees and peridex for VAP prevention  Pepcid for GI prophylaxis  SAT/SBT when able (ABCDEF Bundle)    Now off sedation. Plan for SBT today, monitor for liberation from MV    GBM (glioblastoma multiforme) (HCC)- (present on admission)  Assessment & Plan  S/p resection in 2/2020 at [facility]  Pt is undergoing chemo/radiation  On dexamethasone 2mg IV Q12H; will continue to wean as able    Awaiting MRI results from his other institution as MRI here may be suggestive of some recurrence  Will need follow up with his NeuroOnc team after discharge from the ICU and hospital    Hyperglycemia- (present on admission)  Assessment & Plan  ssi if necessary  On steroids    Leukocytosis- (present on admission)  Assessment & Plan  ? Aspiration  On antibiotics    Hypokalemia- (present on admission)  Assessment & Plan  Goal > 4    Hyponatremia  Assessment & Plan  Monitor   goal 135-145    Essential hypertension- (present on admission)  Assessment & Plan  Keep sbp < 160        VTE:  Contraindicated  Ulcer: H2 Antagonist  Lines: Central Line  Ongoing indication addressed and Novak Catheter  Ongoing indication addressed    I have performed a physical exam and reviewed and updated ROS and Plan today (5/23/2020). In review of yesterday's note (5/22/2020), there are no changes except as documented above.     Discussed patient condition and risk of morbidity and/or mortality with Family, RN, RT, Pharmacy, Code status disscussed, Charge nurse / hot rounds and neurology     I have  assessed and reassessed his respiratory status with ventilator adjustments and spontaneous breathing trials, airway mechanics, ventilator waveforms, blood pressure, hemodynamics, cardiovascular status with titration of a fentanyl gtt and his neurologic status.  He is at increased risk for worsening respiratory, cardiovascular and CNS  Dysfunction.    Critical care time = 36 minutes in directly providing and coordinating critical care and extensive data review.  No time overlap and excludes procedures.

## 2020-05-23 NOTE — FLOWSHEET NOTE
05/23/20 0640   Weaning Trial   Weaning Trial Stopped due to: Pt weaned for 1 hour and returned to rest settings per protocol   Length of Weaning Trial (Hours) 1   Weaning Parameters   $ FVC / Vital Capacity (liters)    (not following)   NIF (cm H2O)  -30   Rapid Shallow Breathing Index (RR/VT) 10   Spontaneous VT 1000

## 2020-05-23 NOTE — CARE PLAN
End of Shift: Patient rested on ventilator overnight on ASV setting. Patient awakens and follows commands. Patient remains weaker on right side. VSS. Sliding scale insulin added for blood sugar in 200's. Bathed in CHG. Central line dressing and caps changed. Will continue to monitor.

## 2020-05-23 NOTE — CARE PLAN
Ventilator Daily Summary    Vent Day # 8    Ventilator settings changed this shift: Not at this time    Weaning trials: Yes, SBT during the day and return to rest settings at night     Respiratory Procedures: No     Plan: Continue current ventilator settings and wean mechanical ventilation as tolerated per physician orders.

## 2020-05-24 ENCOUNTER — APPOINTMENT (OUTPATIENT)
Dept: RADIOLOGY | Facility: MEDICAL CENTER | Age: 60
DRG: 100 | End: 2020-05-24
Attending: PSYCHIATRY & NEUROLOGY
Payer: OTHER MISCELLANEOUS

## 2020-05-24 ENCOUNTER — APPOINTMENT (OUTPATIENT)
Dept: RADIOLOGY | Facility: MEDICAL CENTER | Age: 60
DRG: 100 | End: 2020-05-24
Attending: INTERNAL MEDICINE
Payer: OTHER MISCELLANEOUS

## 2020-05-24 LAB
ACTION RANGE TRIGGERED IACRT: NO
ANION GAP SERPL CALC-SCNC: 12 MMOL/L (ref 7–16)
ANISOCYTOSIS BLD QL SMEAR: ABNORMAL
BASE EXCESS BLDA CALC-SCNC: -2 MMOL/L (ref -4–3)
BASOPHILS # BLD AUTO: 0.9 % (ref 0–1.8)
BASOPHILS # BLD: 0.09 K/UL (ref 0–0.12)
BODY TEMPERATURE: ABNORMAL DEGREES
BUN SERPL-MCNC: 54 MG/DL (ref 8–22)
CALCIUM SERPL-MCNC: 9.4 MG/DL (ref 8.5–10.5)
CHLORIDE SERPL-SCNC: 115 MMOL/L (ref 96–112)
CO2 BLDA-SCNC: 22 MMOL/L (ref 20–33)
CO2 SERPL-SCNC: 23 MMOL/L (ref 20–33)
CREAT SERPL-MCNC: 0.81 MG/DL (ref 0.5–1.4)
EOSINOPHIL # BLD AUTO: 0 K/UL (ref 0–0.51)
EOSINOPHIL NFR BLD: 0 % (ref 0–6.9)
ERYTHROCYTE [DISTWIDTH] IN BLOOD BY AUTOMATED COUNT: 58.6 FL (ref 35.9–50)
GLUCOSE BLD-MCNC: 176 MG/DL (ref 65–99)
GLUCOSE BLD-MCNC: 181 MG/DL (ref 65–99)
GLUCOSE BLD-MCNC: 209 MG/DL (ref 65–99)
GLUCOSE BLD-MCNC: 242 MG/DL (ref 65–99)
GLUCOSE SERPL-MCNC: 213 MG/DL (ref 65–99)
HCO3 BLDA-SCNC: 21.1 MMOL/L (ref 17–25)
HCT VFR BLD AUTO: 39.2 % (ref 42–52)
HGB BLD-MCNC: 12.3 G/DL (ref 14–18)
HOROWITZ INDEX BLDA+IHG-RTO: 207 MM[HG]
INST. QUALIFIED PATIENT IIQPT: YES
LYMPHOCYTES # BLD AUTO: 1.06 K/UL (ref 1–4.8)
LYMPHOCYTES NFR BLD: 10.4 % (ref 22–41)
MACROCYTES BLD QL SMEAR: ABNORMAL
MANUAL DIFF BLD: NORMAL
MCH RBC QN AUTO: 31.5 PG (ref 27–33)
MCHC RBC AUTO-ENTMCNC: 31.4 G/DL (ref 33.7–35.3)
MCV RBC AUTO: 100.5 FL (ref 81.4–97.8)
METAMYELOCYTES NFR BLD MANUAL: 0.9 %
MONOCYTES # BLD AUTO: 0.62 K/UL (ref 0–0.85)
MONOCYTES NFR BLD AUTO: 6.1 % (ref 0–13.4)
MORPHOLOGY BLD-IMP: NORMAL
MYELOCYTES NFR BLD MANUAL: 0.9 %
NEUTROPHILS # BLD AUTO: 8.25 K/UL (ref 1.82–7.42)
NEUTROPHILS NFR BLD: 80.9 % (ref 44–72)
NRBC # BLD AUTO: 0 K/UL
NRBC BLD-RTO: 0 /100 WBC
O2/TOTAL GAS SETTING VFR VENT: 30 %
PCO2 BLDA: 31.5 MMHG (ref 26–37)
PCO2 TEMP ADJ BLDA: 31.4 MMHG (ref 26–37)
PH BLDA: 7.43 [PH] (ref 7.4–7.5)
PH TEMP ADJ BLDA: 7.44 [PH] (ref 7.4–7.5)
PLATELET # BLD AUTO: 163 K/UL (ref 164–446)
PLATELET BLD QL SMEAR: NORMAL
PMV BLD AUTO: 10.4 FL (ref 9–12.9)
PO2 BLDA: 62 MMHG (ref 64–87)
PO2 TEMP ADJ BLDA: 62 MMHG (ref 64–87)
POLYCHROMASIA BLD QL SMEAR: NORMAL
POTASSIUM SERPL-SCNC: 3.7 MMOL/L (ref 3.6–5.5)
RBC # BLD AUTO: 3.9 M/UL (ref 4.7–6.1)
RBC BLD AUTO: PRESENT
SAO2 % BLDA: 92 % (ref 93–99)
SODIUM SERPL-SCNC: 150 MMOL/L (ref 135–145)
SPECIMEN DRAWN FROM PATIENT: ABNORMAL
WBC # BLD AUTO: 10.2 K/UL (ref 4.8–10.8)

## 2020-05-24 PROCEDURE — 94150 VITAL CAPACITY TEST: CPT

## 2020-05-24 PROCEDURE — 700102 HCHG RX REV CODE 250 W/ 637 OVERRIDE(OP): Performed by: PSYCHIATRY & NEUROLOGY

## 2020-05-24 PROCEDURE — 700102 HCHG RX REV CODE 250 W/ 637 OVERRIDE(OP): Performed by: INTERNAL MEDICINE

## 2020-05-24 PROCEDURE — A9270 NON-COVERED ITEM OR SERVICE: HCPCS | Performed by: INTERNAL MEDICINE

## 2020-05-24 PROCEDURE — 99291 CRITICAL CARE FIRST HOUR: CPT | Performed by: PSYCHIATRY & NEUROLOGY

## 2020-05-24 PROCEDURE — 700111 HCHG RX REV CODE 636 W/ 250 OVERRIDE (IP): Performed by: INTERNAL MEDICINE

## 2020-05-24 PROCEDURE — 82962 GLUCOSE BLOOD TEST: CPT

## 2020-05-24 PROCEDURE — 700111 HCHG RX REV CODE 636 W/ 250 OVERRIDE (IP): Performed by: PSYCHIATRY & NEUROLOGY

## 2020-05-24 PROCEDURE — 94003 VENT MGMT INPAT SUBQ DAY: CPT

## 2020-05-24 PROCEDURE — 36600 WITHDRAWAL OF ARTERIAL BLOOD: CPT

## 2020-05-24 PROCEDURE — 82803 BLOOD GASES ANY COMBINATION: CPT

## 2020-05-24 PROCEDURE — A9270 NON-COVERED ITEM OR SERVICE: HCPCS | Performed by: PSYCHIATRY & NEUROLOGY

## 2020-05-24 PROCEDURE — 306565 RIGID MIT RESTRAINT(PAIR): Performed by: INTERNAL MEDICINE

## 2020-05-24 PROCEDURE — 700101 HCHG RX REV CODE 250: Performed by: INTERNAL MEDICINE

## 2020-05-24 PROCEDURE — 770022 HCHG ROOM/CARE - ICU (200)

## 2020-05-24 PROCEDURE — 80048 BASIC METABOLIC PNL TOTAL CA: CPT

## 2020-05-24 PROCEDURE — 94770 HCHG CO2 EXPIRED GAS DETERMINATION: CPT

## 2020-05-24 PROCEDURE — 71045 X-RAY EXAM CHEST 1 VIEW: CPT

## 2020-05-24 PROCEDURE — 85007 BL SMEAR W/DIFF WBC COUNT: CPT

## 2020-05-24 PROCEDURE — 85027 COMPLETE CBC AUTOMATED: CPT

## 2020-05-24 RX ADMIN — INSULIN HUMAN 1 UNITS: 100 INJECTION, SOLUTION PARENTERAL at 17:53

## 2020-05-24 RX ADMIN — INSULIN HUMAN 2 UNITS: 100 INJECTION, SOLUTION PARENTERAL at 11:35

## 2020-05-24 RX ADMIN — LEVETIRACETAM 1500 MG: 500 TABLET ORAL at 17:19

## 2020-05-24 RX ADMIN — POTASSIUM BICARBONATE 50 MEQ: 978 TABLET, EFFERVESCENT ORAL at 05:12

## 2020-05-24 RX ADMIN — LOSARTAN POTASSIUM 100 MG: 50 TABLET, FILM COATED ORAL at 17:15

## 2020-05-24 RX ADMIN — AMLODIPINE BESYLATE 5 MG: 5 TABLET ORAL at 05:12

## 2020-05-24 RX ADMIN — FAMOTIDINE 20 MG: 20 TABLET ORAL at 05:12

## 2020-05-24 RX ADMIN — ENOXAPARIN SODIUM 40 MG: 100 INJECTION SUBCUTANEOUS at 05:10

## 2020-05-24 RX ADMIN — DOCUSATE SODIUM 50 MG AND SENNOSIDES 8.6 MG 2 TABLET: 8.6; 5 TABLET, FILM COATED ORAL at 05:11

## 2020-05-24 RX ADMIN — INSULIN HUMAN 2 UNITS: 100 INJECTION, SOLUTION PARENTERAL at 00:16

## 2020-05-24 RX ADMIN — MAGNESIUM HYDROXIDE 30 ML: 400 SUSPENSION ORAL at 05:10

## 2020-05-24 RX ADMIN — LEVETIRACETAM 1500 MG: 500 TABLET ORAL at 05:10

## 2020-05-24 RX ADMIN — DOCUSATE SODIUM 50 MG AND SENNOSIDES 8.6 MG 2 TABLET: 8.6; 5 TABLET, FILM COATED ORAL at 17:20

## 2020-05-24 RX ADMIN — DEXAMETHASONE SODIUM PHOSPHATE 2 MG: 4 INJECTION, SOLUTION INTRA-ARTICULAR; INTRALESIONAL; INTRAMUSCULAR; INTRAVENOUS; SOFT TISSUE at 05:10

## 2020-05-24 RX ADMIN — TOPIRAMATE 200 MG: 100 TABLET, FILM COATED ORAL at 05:10

## 2020-05-24 RX ADMIN — INSULIN HUMAN 1 UNITS: 100 INJECTION, SOLUTION PARENTERAL at 05:13

## 2020-05-24 RX ADMIN — VALPROIC ACID 750 MG: 250 SOLUTION ORAL at 17:16

## 2020-05-24 RX ADMIN — DEXAMETHASONE SODIUM PHOSPHATE 2 MG: 4 INJECTION, SOLUTION INTRA-ARTICULAR; INTRALESIONAL; INTRAMUSCULAR; INTRAVENOUS; SOFT TISSUE at 17:19

## 2020-05-24 RX ADMIN — FUROSEMIDE 40 MG: 10 INJECTION, SOLUTION INTRAMUSCULAR; INTRAVENOUS at 05:10

## 2020-05-24 RX ADMIN — VALPROIC ACID 750 MG: 250 SOLUTION ORAL at 05:10

## 2020-05-24 RX ADMIN — LACOSAMIDE 300 MG: 100 TABLET, FILM COATED ORAL at 05:11

## 2020-05-24 RX ADMIN — TOPIRAMATE 200 MG: 100 TABLET, FILM COATED ORAL at 17:16

## 2020-05-24 RX ADMIN — POTASSIUM BICARBONATE 25 MEQ: 978 TABLET, EFFERVESCENT ORAL at 11:25

## 2020-05-24 RX ADMIN — LACOSAMIDE 300 MG: 100 TABLET, FILM COATED ORAL at 17:19

## 2020-05-24 ASSESSMENT — PULMONARY FUNCTION TESTS: FVC: 1000

## 2020-05-24 ASSESSMENT — COPD QUESTIONNAIRES
COPD SCREENING SCORE: 0
HAVE YOU SMOKED AT LEAST 100 CIGARETTES IN YOUR ENTIRE LIFE: NO/DON'T KNOW
DO YOU EVER COUGH UP ANY MUCUS OR PHLEGM?: NO/ONLY WITH OCCASIONAL COLDS OR INFECTIONS
DURING THE PAST 4 WEEKS HOW MUCH DID YOU FEEL SHORT OF BREATH: NONE/LITTLE OF THE TIME

## 2020-05-24 ASSESSMENT — FIBROSIS 4 INDEX: FIB4 SCORE: 0.51

## 2020-05-24 NOTE — PROGRESS NOTES
Critical Care Progress Note    Date of admission  5/13/2020    Chief Complaint  59yo male with hx of GBM s/p resection in 2/2020, hx of seizures now admitted for recurrent seizures. Pt is on keppra 750mg PO BID and low dose dexamethasone 0.25mg PO daily at home. He presented after having right sided convulsive movements and slurred speech.      Pt was started on topamax, and valproate, and vimpat since admission. Keppra dose was increased to 1500 Q12H. On dexamethasone 4 Q12H. Given ativan of total 6mg since admission. Pt is somnolent and given the quick escalation of his antiseizure meds to control his seizures, pt's transferred to ICU for further management.  Upon ICU arrival pt was obtunded       Hospital Course          Interval Problem Update  Reviewed last 24 hour events:  - No sz on EEG overnight   - Neuro: GCS 10t [id number]   - HR: 50- 90s   - SBP: 100s   - GI TF at goal   - UOP: 3.L/24h   - Novak: yes   - Tm: 37.9   - Vent day   - ABG: Reviewed   - CXR (personally reviewed and compared to prior)bibasilar atelectasis.small amount of  bilateral pulmonary edema. No obvious effusion or consolidation   - Current titration of fentanyl   - SBT this am good parameters plan for extubation today       AEDs  Keppra  Vimpat  Topamax  Depacon      Review of Systems  Review of Systems   Unable to perform ROS: Intubated        Vital Signs for last 24 hours   Pulse:  [] 93  Resp:  [8-44] 28  BP: ()/() 104/59  SpO2:  [93 %-99 %] 99 %    Hemodynamic parameters for last 24 hours       Respiratory Information for the last 24 hours  Vent Mode: ASV  Rate (breaths/min): 17  PEEP/CPAP: 8  P Support: 5  MAP: 10  Length of Weaning Trial (Hours): 1    Physical Exam   Physical Exam  Vitals signs and nursing note reviewed.   Constitutional:       General: He is not in acute distress.     Appearance: He is ill-appearing. He is not toxic-appearing or diaphoretic.   HENT:      Head: Normocephalic and atraumatic.      Right  Ear: External ear normal.      Left Ear: External ear normal.      Nose: Nose normal.      Mouth/Throat:      Mouth: Mucous membranes are moist.      Pharynx: Oropharynx is clear. No oropharyngeal exudate or posterior oropharyngeal erythema.   Eyes:      General: No scleral icterus.        Right eye: No discharge.         Left eye: No discharge.      Extraocular Movements: Extraocular movements intact.      Conjunctiva/sclera: Conjunctivae normal.      Pupils: Pupils are equal, round, and reactive to light.   Neck:      Musculoskeletal: Normal range of motion.   Cardiovascular:      Rate and Rhythm: Normal rate and regular rhythm.      Pulses: Normal pulses.      Heart sounds: Normal heart sounds. No murmur.   Pulmonary:      Breath sounds: No stridor. No wheezing, rhonchi or rales.      Comments: On ventilator  Abdominal:      General: There is no distension.      Tenderness: There is no abdominal tenderness.   Musculoskeletal:         General: No swelling or tenderness.      Right lower leg: No edema.      Left lower leg: No edema.   Skin:     Coloration: Skin is not jaundiced.      Findings: No rash.   Neurological:      Mental Status: He is alert.      Cranial Nerves: No cranial nerve deficit.      Comments: GCS 10t [id number] Pupils reactive. Follows commands. Squeezes hands moves toes. Wiggles left foot.  Stick out tongue. Weak neck flexors.         Medications  Current Facility-Administered Medications   Medication Dose Route Frequency Provider Last Rate Last Dose   • insulin regular (HUMULIN R) injection 1-6 Units  1-6 Units Subcutaneous Q6HRS Jeremy M Gonda, M.D.   1 Units at 05/24/20 0513    And   • glucose 4 g chewable tablet 16 g  16 g Oral Q15 MIN PRN Jeremy M Gonda, M.D.        And   • dextrose 50% (D50W) injection 50 mL  50 mL Intravenous Q15 MIN PRN Jeremy M Gonda, M.D.       • potassium bicarbonate (KLYTE) effervescent tablet 50 mEq  50 mEq Enteral Tube DAILY Nik Jones M.D.   50 mEq at 05/24/20  0512   • lacosamide (VIMPAT) tablet 300 mg  300 mg Enteral Tube BID Nik Jones M.D.   300 mg at 05/24/20 0511   • levETIRAcetam (KEPPRA) tablet 1,500 mg  1,500 mg Enteral Tube BID Nik Jones M.D.   1,500 mg at 05/24/20 0510   • Valproate Sodium (DEPAKENE) oral solution 750 mg  750 mg Enteral Tube BID Nik Jones M.D.   750 mg at 05/24/20 0510   • dexamethasone (DECADRON) injection 2 mg  2 mg Intravenous Q12HRS Nik Jones M.D.   2 mg at 05/24/20 0510   • fentaNYL (SUBLIMAZE) injection 100 mcg  100 mcg Intravenous Q15 MIN PRN Nik Jones M.D.   100 mcg at 05/23/20 0139    And   • fentaNYL (SUBLIMAZE) injection 200 mcg  200 mcg Intravenous Q15 MIN PRN Nik Jones M.D.   200 mcg at 05/22/20 2240    And   • fentaNYL (SUBLIMAZE) 50 mcg/mL in 50mL (Continuous Infusion)   Intravenous Continuous Nik Jones M.D. 2 mL/hr at 05/24/20 0656 100 mcg/hr at 05/24/20 0656    And   • Dexmedetomidine HCl-Dextrose 200MCG/50ML -5% SOLN  0-1.5 mcg/kg/hr Intravenous Continuous Nik Jones M.D.   Stopped at 05/21/20 0945   • amLODIPine (NORVASC) tablet 5 mg  5 mg Enteral Tube Q DAY Nik Jones M.D.   5 mg at 05/24/20 0512   • scopolamine (TRANSDERM-SCOP) patch 1 Patch  1 Patch Transdermal Q72HRS Nik Jones M.D.   1 Patch at 05/22/20 1144   • furosemide (LASIX) injection 40 mg  40 mg Intravenous Q DAY Colton Perez M.D.   40 mg at 05/24/20 0510   • enoxaparin (LOVENOX) inj 40 mg  40 mg Subcutaneous DAILY Colton Perez M.D.   40 mg at 05/24/20 0510   • Pharmacy Consult: Enteral tube insertion - review meds/change route/product selection  1 Each Other PHARMACY TO DOSE Colton Perez M.D.       • losartan (COZAAR) tablet 100 mg  100 mg Enteral Tube DAILY Colton Perez M.D.   100 mg at 05/23/20 1858   • topiramate (TOPAMAX) tablet 200 mg  200 mg Enteral Tube Q12HRS Colton Perez M.D.   200 mg at 05/24/20 0510   • acetaminophen (TYLENOL) tablet 650 mg  650 mg Enteral Tube Q6HRS PRN Colton Perez  M.D.   650 mg at 05/20/20 1937   • ondansetron (ZOFRAN ODT) dispertab 4 mg  4 mg Enteral Tube Q4HRS PRN Colton Perez M.D.       • promethazine (PHENERGAN) tablet 12.5-25 mg  12.5-25 mg Enteral Tube Q4HRS PRN Colton Perez M.D.       • Pharmacy Consult Request ...Pain Management Review 1 Each  1 Each Other PHARMACY TO DOSE Colton Perez M.D.        And   • oxyCODONE immediate-release (ROXICODONE) tablet 2.5 mg  2.5 mg Enteral Tube Q3HRS PRN Colton Perez M.D.        And   • oxyCODONE immediate-release (ROXICODONE) tablet 5 mg  5 mg Enteral Tube Q3HRS PRN Colton Perez M.D.       • Respiratory Therapy Consult   Nebulization Continuous RT Nikhil Erickson D.O.       • ipratropium-albuterol (DUONEB) nebulizer solution  3 mL Nebulization Q2HRS PRN (RT) NICK ReddyO.       • famotidine (PEPCID) tablet 20 mg  20 mg Enteral Tube Q12HRS NICK ReddyOStephie   20 mg at 05/24/20 0512   • senna-docusate (PERICOLACE or SENOKOT S) 8.6-50 MG per tablet 2 Tab  2 Tab Enteral Tube BID NICK ReddyO.   2 Tab at 05/24/20 0511    And   • polyethylene glycol/lytes (MIRALAX) PACKET 1 Packet  1 Packet Enteral Tube QDAY PRN NICK ReddyO.   1 Packet at 05/23/20 0648    And   • magnesium hydroxide (MILK OF MAGNESIA) suspension 30 mL  30 mL Enteral Tube QDAY PRN NICK ReddyO.   30 mL at 05/24/20 0510    And   • bisacodyl (DULCOLAX) suppository 10 mg  10 mg Rectal QDAY PRN BRADY Reddy.O.       • MD Alert...ICU Electrolyte Replacement per Pharmacy   Other PHARMACY TO DOSE NICK ReddyO.       • lidocaine (XYLOCAINE) 1 % injection 1-2 mL  1-2 mL Tracheal Tube Q30 MIN PRN Nikhil Erickson D.O.       • ondansetron (ZOFRAN) syringe/vial injection 4 mg  4 mg Intravenous Q4HRS PRN Igor Carlos M.D.       • promethazine (PHENERGAN) suppository 12.5-25 mg  12.5-25 mg Rectal Q4HRS PRN Igor Carlos M.D.       • prochlorperazine (COMPAZINE) injection 5-10 mg  5-10 mg Intravenous Q4HRS PRN Igor Carlos M.D.       • LORazepam (ATIVAN)  injection 4 mg  4 mg Intravenous Q10 MIN PRN Igor Carlos M.D.   4 mg at 05/15/20 1545   • hydrALAZINE (APRESOLINE) injection 10 mg  10 mg Intravenous Q6HRS PRN Guido Pierre M.D.   10 mg at 05/22/20 0208       Fluids    Intake/Output Summary (Last 24 hours) at 5/24/2020 0724  Last data filed at 5/24/2020 0600  Gross per 24 hour   Intake 2277.75 ml   Output 3035 ml   Net -757.25 ml       Laboratory  Recent Labs     05/22/20  0453 05/23/20  0306 05/24/20  0512   ISTATAPH 7.419 7.384* 7.434   ISTATAPCO2 29.9 33.2 31.5   ISTATAPO2 89* 113* 62*   ISTATATCO2 20 21 22   [id number] 97 98 92*   ISTATARTHCO3 19.3 19.8 21.1   ISTATARTBE -4 -5* -2   ISTATTEMP 98.6 F 98.1 F 98.4 F   ISTATFIO2 30 30 30   ISTATSPEC Arterial Arterial Arterial   ISTATAPHTC 7.419 7.388* 7.436   [id number] 89* 111* 62*         Recent Labs     05/21/20  0835 05/22/20  0500 05/23/20  0255 05/24/20  0206   SODIUM  --  140 149* 150*   POTASSIUM  --  3.4* 3.8 3.7   CHLORIDE  --  110 119* 115*   CO2  --  20 21 23   BUN  --  50* 55* 54*   CREATININE  --  0.68 0.74 0.81   MAGNESIUM 2.1  --   --   --    CALCIUM  --  8.9 9.0 9.4     Recent Labs     05/22/20  0500 05/23/20  0255 05/24/20  0206   GLUCOSE 184* 203* 213*     Recent Labs     05/22/20  0415 05/23/20  0255 05/24/20  0206   WBC 10.3 9.8 10.2   NEUTSPOLYS 61.20 69.80 80.90*   LYMPHOCYTES 12.90* 16.40* 10.40*   MONOCYTES 8.60 9.50 6.10   EOSINOPHILS 1.70 0.00 0.00   BASOPHILS 0.90 0.00 0.90     Recent Labs     05/22/20  0415 05/23/20  0255 05/24/20  0206   RBC 4.11* 4.04* 3.90*   HEMOGLOBIN 12.7* 12.5* 12.3*   HEMATOCRIT 40.5* 40.6* 39.2*   PLATELETCT 164 162* 163*       Imaging  X-Ray:  I have personally reviewed the images and compared with prior images.  EKG:  I have personally reviewed the images and compared with prior images.  CT:    Reviewed  MRI:   Reviewed    Assessment/Plan  * Status epilepticus (HCC)- (present on admission)  Assessment & Plan  Refractory  Continue keppra, valproate,  topamax and vimpat  Now off Ketamine and Propofol >48 hours  No sz > 48 d/c EEG  Fentanyl gtt for vent synchrony and comfort until able to liberate from the MV    Acute respiratory failure with hypoxia (HCC)  Assessment & Plan  Intubated for airway protection given drip medication required for seizure control  sbt once seizures resolved and able to wean sedation  RT/O2 protocols  Daily and PRN ABGs  Titration of ventilator therapy based on ABGs and patient's status  Sedation as tolerated/indicated  Daily CXR  HOB >30 degrees and peridex for VAP prevention  Pepcid for GI prophylaxis  SAT/SBT when able (ABCDEF Bundle)    Now off sedation. SBT today. Failed today as he became quite tachycardic during his trial.    GBM (glioblastoma multiforme) (HCC)- (present on admission)  Assessment & Plan  S/p resection in 2/2020 at [facility]  Pt is undergoing chemo/radiation  On dexamethasone 2mg IV Q12H; will continue to wean as able    Awaiting MRI results from his other institution as MRI here may be suggestive of some recurrence  Will need follow up with his NeuroOnc team after discharge from the ICU and hospital    Hyperglycemia- (present on admission)  Assessment & Plan  ssi if necessary  On steroids    Leukocytosis- (present on admission)  Assessment & Plan  ? Aspiration  On antibiotics    Hypokalemia- (present on admission)  Assessment & Plan  Goal > 4    Hyponatremia  Assessment & Plan  Monitor   goal 135-145    Essential hypertension- (present on admission)  Assessment & Plan  Keep sbp < 160        VTE:  Contraindicated  Ulcer: H2 Antagonist  Lines: Central Line  Ongoing indication addressed and Novak Catheter  Ongoing indication addressed    I have performed a physical exam and reviewed and updated ROS and Plan today (5/24/2020). In review of yesterday's note (5/23/2020), there are no changes except as documented above.     Discussed patient condition and risk of morbidity and/or mortality with Family, RN, RT, Pharmacy, Code  status disscussed, Charge nurse / hot rounds and neurology     I have assessed and reassessed his respiratory status with ventilator adjustments and spontaneous breathing trials, airway mechanics, ventilator waveforms, blood pressure, hemodynamics, cardiovascular status with titration of a fentanyl gtt and his neurologic status.  He was extubated this am and remains at high risk for reintubation given his GBM, high risk of recurrent seizures and is at risk for worsening respiratory,  and CNS  Dysfunction.    Critical care time = 32 minutes in directly providing and coordinating critical care and extensive data review.  No time overlap and excludes procedures.

## 2020-05-24 NOTE — FLOWSHEET NOTE
05/24/20 0645   Weaning Trial   Weaning Trial Stopped due to: Pt weaned for 1 hour and returned to rest settings per protocol   Length of Weaning Trial (Hours) 1   Weaning Parameters   $ FVC / Vital Capacity (liters)  1000   NIF (cm H2O)  -60   Rapid Shallow Breathing Index (RR/VT) 41

## 2020-05-25 PROBLEM — E87.0 HYPERNATREMIA: Status: ACTIVE | Noted: 2020-05-25

## 2020-05-25 LAB
ALBUMIN SERPL BCP-MCNC: 4 G/DL (ref 3.2–4.9)
ALBUMIN/GLOB SERPL: 1.7 G/DL
ALP SERPL-CCNC: 40 U/L (ref 30–99)
ALT SERPL-CCNC: 81 U/L (ref 2–50)
ANION GAP SERPL CALC-SCNC: 11 MMOL/L (ref 7–16)
ANISOCYTOSIS BLD QL SMEAR: ABNORMAL
AST SERPL-CCNC: 26 U/L (ref 12–45)
BASOPHILS # BLD AUTO: 0 % (ref 0–1.8)
BASOPHILS # BLD: 0 K/UL (ref 0–0.12)
BILIRUB SERPL-MCNC: 0.4 MG/DL (ref 0.1–1.5)
BUN SERPL-MCNC: 52 MG/DL (ref 8–22)
CALCIUM SERPL-MCNC: 9.4 MG/DL (ref 8.5–10.5)
CHLORIDE SERPL-SCNC: 115 MMOL/L (ref 96–112)
CO2 SERPL-SCNC: 24 MMOL/L (ref 20–33)
CREAT SERPL-MCNC: 0.81 MG/DL (ref 0.5–1.4)
DACRYOCYTES BLD QL SMEAR: NORMAL
EOSINOPHIL # BLD AUTO: 0.11 K/UL (ref 0–0.51)
EOSINOPHIL NFR BLD: 0.9 % (ref 0–6.9)
ERYTHROCYTE [DISTWIDTH] IN BLOOD BY AUTOMATED COUNT: 53.7 FL (ref 35.9–50)
GLOBULIN SER CALC-MCNC: 2.4 G/DL (ref 1.9–3.5)
GLUCOSE BLD-MCNC: 176 MG/DL (ref 65–99)
GLUCOSE BLD-MCNC: 178 MG/DL (ref 65–99)
GLUCOSE BLD-MCNC: 200 MG/DL (ref 65–99)
GLUCOSE BLD-MCNC: 214 MG/DL (ref 65–99)
GLUCOSE BLD-MCNC: 250 MG/DL (ref 65–99)
GLUCOSE SERPL-MCNC: 208 MG/DL (ref 65–99)
HCT VFR BLD AUTO: 41.5 % (ref 42–52)
HGB BLD-MCNC: 13.3 G/DL (ref 14–18)
LYMPHOCYTES # BLD AUTO: 0.94 K/UL (ref 1–4.8)
LYMPHOCYTES NFR BLD: 7.8 % (ref 22–41)
MANUAL DIFF BLD: NORMAL
MCH RBC QN AUTO: 31.2 PG (ref 27–33)
MCHC RBC AUTO-ENTMCNC: 32 G/DL (ref 33.7–35.3)
MCV RBC AUTO: 97.4 FL (ref 81.4–97.8)
METAMYELOCYTES NFR BLD MANUAL: 3.4 %
MICROCYTES BLD QL SMEAR: ABNORMAL
MONOCYTES # BLD AUTO: 0.31 K/UL (ref 0–0.85)
MONOCYTES NFR BLD AUTO: 2.6 % (ref 0–13.4)
MORPHOLOGY BLD-IMP: NORMAL
MYELOCYTES NFR BLD MANUAL: 2.6 %
NEUTROPHILS # BLD AUTO: 10.02 K/UL (ref 1.82–7.42)
NEUTROPHILS NFR BLD: 80.2 % (ref 44–72)
NEUTS BAND NFR BLD MANUAL: 2.6 % (ref 0–10)
NRBC # BLD AUTO: 0 K/UL
NRBC BLD-RTO: 0 /100 WBC
PLATELET # BLD AUTO: 170 K/UL (ref 164–446)
PLATELET BLD QL SMEAR: NORMAL
PMV BLD AUTO: 10.1 FL (ref 9–12.9)
POIKILOCYTOSIS BLD QL SMEAR: NORMAL
POLYCHROMASIA BLD QL SMEAR: NORMAL
POTASSIUM SERPL-SCNC: 3.7 MMOL/L (ref 3.6–5.5)
PROT SERPL-MCNC: 6.4 G/DL (ref 6–8.2)
RBC # BLD AUTO: 4.26 M/UL (ref 4.7–6.1)
RBC BLD AUTO: PRESENT
SODIUM SERPL-SCNC: 150 MMOL/L (ref 135–145)
WBC # BLD AUTO: 12.1 K/UL (ref 4.8–10.8)

## 2020-05-25 PROCEDURE — 71045 X-RAY EXAM CHEST 1 VIEW: CPT

## 2020-05-25 PROCEDURE — 85007 BL SMEAR W/DIFF WBC COUNT: CPT

## 2020-05-25 PROCEDURE — 99233 SBSQ HOSP IP/OBS HIGH 50: CPT | Performed by: INTERNAL MEDICINE

## 2020-05-25 PROCEDURE — 700101 HCHG RX REV CODE 250: Performed by: INTERNAL MEDICINE

## 2020-05-25 PROCEDURE — 700111 HCHG RX REV CODE 636 W/ 250 OVERRIDE (IP): Performed by: INTERNAL MEDICINE

## 2020-05-25 PROCEDURE — 85027 COMPLETE CBC AUTOMATED: CPT

## 2020-05-25 PROCEDURE — 700102 HCHG RX REV CODE 250 W/ 637 OVERRIDE(OP): Performed by: INTERNAL MEDICINE

## 2020-05-25 PROCEDURE — 700111 HCHG RX REV CODE 636 W/ 250 OVERRIDE (IP): Performed by: PSYCHIATRY & NEUROLOGY

## 2020-05-25 PROCEDURE — 770020 HCHG ROOM/CARE - TELE (206)

## 2020-05-25 PROCEDURE — A9270 NON-COVERED ITEM OR SERVICE: HCPCS | Performed by: INTERNAL MEDICINE

## 2020-05-25 PROCEDURE — 80053 COMPREHEN METABOLIC PANEL: CPT

## 2020-05-25 PROCEDURE — A9270 NON-COVERED ITEM OR SERVICE: HCPCS | Performed by: PSYCHIATRY & NEUROLOGY

## 2020-05-25 PROCEDURE — 97166 OT EVAL MOD COMPLEX 45 MIN: CPT

## 2020-05-25 PROCEDURE — 700102 HCHG RX REV CODE 250 W/ 637 OVERRIDE(OP): Performed by: PSYCHIATRY & NEUROLOGY

## 2020-05-25 PROCEDURE — 99233 SBSQ HOSP IP/OBS HIGH 50: CPT | Performed by: PSYCHIATRY & NEUROLOGY

## 2020-05-25 PROCEDURE — 97163 PT EVAL HIGH COMPLEX 45 MIN: CPT

## 2020-05-25 PROCEDURE — 82962 GLUCOSE BLOOD TEST: CPT

## 2020-05-25 RX ADMIN — LACOSAMIDE 300 MG: 100 TABLET, FILM COATED ORAL at 05:19

## 2020-05-25 RX ADMIN — INSULIN HUMAN 1 UNITS: 100 INJECTION, SOLUTION PARENTERAL at 00:07

## 2020-05-25 RX ADMIN — BISACODYL 10 MG: 10 SUPPOSITORY RECTAL at 05:12

## 2020-05-25 RX ADMIN — ENOXAPARIN SODIUM 40 MG: 100 INJECTION SUBCUTANEOUS at 05:12

## 2020-05-25 RX ADMIN — VALPROIC ACID 750 MG: 250 SOLUTION ORAL at 05:12

## 2020-05-25 RX ADMIN — DOCUSATE SODIUM 50 MG AND SENNOSIDES 8.6 MG 2 TABLET: 8.6; 5 TABLET, FILM COATED ORAL at 18:08

## 2020-05-25 RX ADMIN — LACOSAMIDE 300 MG: 100 TABLET, FILM COATED ORAL at 22:18

## 2020-05-25 RX ADMIN — LEVETIRACETAM 1500 MG: 500 TABLET ORAL at 05:12

## 2020-05-25 RX ADMIN — POTASSIUM BICARBONATE 50 MEQ: 978 TABLET, EFFERVESCENT ORAL at 05:12

## 2020-05-25 RX ADMIN — LEVETIRACETAM 1500 MG: 500 TABLET ORAL at 18:08

## 2020-05-25 RX ADMIN — VALPROIC ACID 750 MG: 250 SOLUTION ORAL at 18:08

## 2020-05-25 RX ADMIN — DEXAMETHASONE SODIUM PHOSPHATE 2 MG: 4 INJECTION, SOLUTION INTRA-ARTICULAR; INTRALESIONAL; INTRAMUSCULAR; INTRAVENOUS; SOFT TISSUE at 05:12

## 2020-05-25 RX ADMIN — AMLODIPINE BESYLATE 5 MG: 5 TABLET ORAL at 05:12

## 2020-05-25 RX ADMIN — SCOPALAMINE 1 PATCH: 1 PATCH, EXTENDED RELEASE TRANSDERMAL at 13:11

## 2020-05-25 RX ADMIN — INSULIN HUMAN 2 UNITS: 100 INJECTION, SOLUTION PARENTERAL at 18:18

## 2020-05-25 RX ADMIN — TOPIRAMATE 200 MG: 100 TABLET, FILM COATED ORAL at 18:08

## 2020-05-25 RX ADMIN — INSULIN HUMAN 1 UNITS: 100 INJECTION, SOLUTION PARENTERAL at 23:37

## 2020-05-25 RX ADMIN — TOPIRAMATE 200 MG: 100 TABLET, FILM COATED ORAL at 05:12

## 2020-05-25 RX ADMIN — LOSARTAN POTASSIUM 100 MG: 50 TABLET, FILM COATED ORAL at 18:07

## 2020-05-25 RX ADMIN — POTASSIUM BICARBONATE 25 MEQ: 978 TABLET, EFFERVESCENT ORAL at 10:35

## 2020-05-25 RX ADMIN — INSULIN HUMAN 2 UNITS: 100 INJECTION, SOLUTION PARENTERAL at 13:16

## 2020-05-25 RX ADMIN — INSULIN HUMAN 1 UNITS: 100 INJECTION, SOLUTION PARENTERAL at 05:13

## 2020-05-25 RX ADMIN — DOCUSATE SODIUM 50 MG AND SENNOSIDES 8.6 MG 2 TABLET: 8.6; 5 TABLET, FILM COATED ORAL at 05:12

## 2020-05-25 ASSESSMENT — COGNITIVE AND FUNCTIONAL STATUS - GENERAL
SUGGESTED CMS G CODE MODIFIER DAILY ACTIVITY: CN
DRESSING REGULAR LOWER BODY CLOTHING: TOTAL
TURNING FROM BACK TO SIDE WHILE IN FLAT BAD: UNABLE
MOBILITY SCORE: 6
MOVING FROM LYING ON BACK TO SITTING ON SIDE OF FLAT BED: UNABLE
PERSONAL GROOMING: TOTAL
EATING MEALS: TOTAL
CLIMB 3 TO 5 STEPS WITH RAILING: TOTAL
MOVING TO AND FROM BED TO CHAIR: UNABLE
WALKING IN HOSPITAL ROOM: TOTAL
STANDING UP FROM CHAIR USING ARMS: TOTAL
HELP NEEDED FOR BATHING: TOTAL
TOILETING: TOTAL
DAILY ACTIVITIY SCORE: 6
DRESSING REGULAR UPPER BODY CLOTHING: TOTAL
SUGGESTED CMS G CODE MODIFIER MOBILITY: CN

## 2020-05-25 ASSESSMENT — GAIT ASSESSMENTS: GAIT LEVEL OF ASSIST: UNABLE TO PARTICIPATE

## 2020-05-25 ASSESSMENT — FIBROSIS 4 INDEX: FIB4 SCORE: 0.48

## 2020-05-25 NOTE — THERAPY
Occupational Therapy   Initial Evaluation     Patient Name: Ahmet Escobedo  Age:  60 y.o., Sex:  male  Medical Record #: [id number]  Today's Date: 5/25/2020    Assessment  Patient is 60 y.o. male admitted for seizure, has a hx of GBM, dx this admission w/status epilepticus, acute respiratory failure w/hypoxia, hyperglycemia, and leukocytosis. Pt is demonstrating poor arousal, impaired cognition, R side weakness and impaired postural/balance impacting ADL's     Plan  Recommend Occupational Therapy 3 times per week until therapy goals are met for the following treatments:  Adaptive Equipment, Cognitive Skill Development, Manual Therapy Techniques, Neuro Re-Education / Balance, Self Care/Activities of Daily Living, Therapeutic Activities and Therapeutic Exercises.    Discharge recommendations:  Recommend post-acute placement for additional occupational therapy services prior to discharge home.     Precautions: (P) Fall Risk, Nasogastric Tube    Subjective  Squeezed w/L-hand to command 1x     Objective   05/25/20 1416   Prior Living Situation   Prior Services Home-Independent   Housing / Facility 1 Story Apartment / Condo   Lives with - Patient's Self Care Capacity Significant Other   Comments all per chart pt unable to provide hx    Prior Level of ADL Function   Comments unable to determine    Prior Level of IADL Function   Comments unable to determine    Precautions   Precautions Fall Risk;Nasogastric Tube   Cognition    Cognition / Consciousness X   Speech/ Communication Slurred;Incomprehensible Words   Level of Consciousness Lethargic   Ability To Follow Commands 1 Step   Attention Impaired   Sequencing Impaired   Initiation Impaired   Comments intermittent command following impaired by poor arousal followed less than 25% of the time    Passive ROM Upper Body   Passive ROM Upper Body WDL   Active ROM Upper Body   Active ROM Upper Body  X   Comments impaired bilaterally d/t cognition but R side impaired by weakness     Strength Upper Body   Upper Body Strength  X   Comments not formally assess RUE w/trace contraction    Sensation Upper Body   Upper Extremity Sensation  Not Tested   Upper Body Muscle Tone   Upper Body Muscle Tone  X   Lt Upper Extremity Muscle Tone Hypotonic   Coordination Upper Body   Coordination X   Comments impaired by cognition and R side weakness    Bed Mobility    Supine to Sit Total Assist   Sit to Supine Total Assist   Scooting Total Assist   ADL Assessment   Grooming Total Assist   Upper Body Dressing Total Assist   Lower Body Dressing Total Assist   Toileting   (NT)   Activity Tolerance   Sitting Edge of Bed 10   Patient / Family Goals   Patient / Family Goal #1 unable to state    Short Term Goals   Short Term Goal # 1 pt will follow 1 step commands 100% of the time   Short Term Goal # 2 pt will participate in oral care w/min A    Short Term Goal # 3 pt will sit EOB in midline w/min A for at least 10 min    Anticipated Discharge Equipment   DC Equipment Unable To Determine At This Time   Interdisciplinary Plan of Care Collaboration   IDT Collaboration with  Nursing;Physical Therapist   Patient Position at End of Therapy In Bed;Call Light within Reach;Tray Table within Reach;Phone within Reach;Bed Alarm On;Other (Comments)  (mitt in place )

## 2020-05-25 NOTE — THERAPY
"Physical Therapy   Initial Evaluation     Patient Name: Ahmet Escobedo  Age:  60 y.o., Sex:  male  Medical Record #: [id number]  Today's Date: 5/25/2020     Precautions: Fall Risk, Nasogastric Tube    Subjective    Pt primarily mimicking therapist's words (repeating \"up!\" when said \"we are going to sit up\")    Assessment  Mr. Escobedo is a 59 y/o male who presents to acute secondary to status epilepticus and acute respiratory failure that required intubation, now extubated. PMH GBM resection 2/2020. PT eval most limited by cognitive impairments. Pt initially answering \"yes/no\" appropriately supine but when EOB command following declined. Unable to follow commands to B LEs but did demonstrate voluntary movements in all planes, mild extensor tone noted and difficulty terminating extensor positions (ex maintaining knee extension at EOB). Did have trace posture and able to follow tactile cues to hold self momentarily. Acute PT to continue to follow while in house. Not safe to discharge home. Recommend post acute placement for additional therapies.      Objective       05/25/20 1434   Prior Level of Functional Mobility   Comments per chart independent at baseline, unable to provide history   Cognition    Comments supine able to follow about 50% 1 step commands, once EOB this dropped to <25%. Difficulty terminating tasks   Strength Lower Body   Comments unable to participate in formal strength testing due to cognition. Demonstrated minimum 3/5 strength through voluntary movements in B LE both supine and EOB   Sensation Lower Body   Lower Extremity Sensation   WDL   Lower Body Muscle Tone   Modified Mike Scale Right Lower Extremity 1   Modified Mike Scale Left Lower Extremity 2   Balance Assessment   Sitting Balance (Static) Poor   Sitting Balance (Dynamic) Poor -   Weight Shift Sitting Poor   Gait Analysis   Gait Level Of Assist Unable to Participate   Bed Mobility    Supine to Sit Total Assist   Sit to Supine Total " Assist   Scooting Total Assist   Functional Mobility   Sit to Stand Unable to Participate   Session Information   Priority 3           Plan    Recommend Physical Therapy 4 times per week until therapy goals are met for the following treatments:  Bed Mobility, Gait Training, Neuro Re-Education / Balance, Stair Training, Therapeutic Activities and Therapeutic Exercises    Discharge recommendations:  Recommend post-acute placement for continued physical therapy services prior to discharge home.

## 2020-05-25 NOTE — PROGRESS NOTES
Dr. Hughes notified that patient pulled out cortrak. Patient was previously in soft wrist restraints due to intubation. MD gave telephone order for mittens to be applied.

## 2020-05-25 NOTE — CARE PLAN
Patient resting throughout night with minimal interruptions; sleep hygiene promoted. Q2 turns completed. Patient remains with a mitt on the left hand; attempting to pull at lines. Skin precautions in place. Bed alarm on and activated for patient safety. Patient's abdomen remains round, taut, and distended with distant hyperactive bowel sounds. Per Dr. Gonda okay to administer suppository overnight since no bowel movement since 5/19. Weaned to RA. Education provided on safety, sleep hygiene, skin care, and respiratory toileting. Patient updated on plan of care.     Problem: Communication  Goal: The ability to communicate needs accurately and effectively will improve  Outcome: PROGRESSING AS EXPECTED     Problem: Safety  Goal: Will remain free from injury  Outcome: PROGRESSING AS EXPECTED  Goal: Will remain free from falls  Outcome: PROGRESSING AS EXPECTED     Problem: Infection  Goal: Will remain free from infection  Outcome: PROGRESSING AS EXPECTED     Problem: Venous Thromboembolism (VTW)/Deep Vein Thrombosis (DVT) Prevention:  Goal: Patient will participate in Venous Thrombosis (VTE)/Deep Vein Thrombosis (DVT)Prevention Measures  Outcome: PROGRESSING AS EXPECTED     Problem: Bowel/Gastric:  Goal: Normal bowel function is maintained or improved  Outcome: PROGRESSING AS EXPECTED  Goal: Will not experience complications related to bowel motility  Outcome: PROGRESSING AS EXPECTED     Problem: Knowledge Deficit  Goal: Knowledge of disease process/condition, treatment plan, diagnostic tests, and medications will improve  Outcome: PROGRESSING AS EXPECTED  Goal: Knowledge of the prescribed therapeutic regimen will improve  Outcome: PROGRESSING AS EXPECTED     Problem: Discharge Barriers/Planning  Goal: Patient's continuum of care needs will be met  Outcome: PROGRESSING AS EXPECTED     Problem: Fluid Volume:  Goal: Will maintain balanced intake and output  Outcome: PROGRESSING AS EXPECTED     Problem: Skin Integrity  Goal:  Risk for impaired skin integrity will decrease  Outcome: PROGRESSING AS EXPECTED     Problem: Safety - Medical Restraint  Goal: Remains free of injury from restraints (Restraint for Interference with Medical Device)  Description: INTERVENTIONS:  1. Determine that other, less restrictive measures have been tried or would not be effective before applying the restraint  2. Evaluate the patient's condition at the time of restraint application  3. Inform patient/family regarding the reason for restraint  4. Q2H: Monitor safety, psychosocial status, comfort, nutrition and hydration  Outcome: PROGRESSING AS EXPECTED  Goal: Free from restraint(s) (Restraint for Interference with Medical Device)  Description: INTERVENTIONS:  1. ONCE/SHIFT or MINIMUM Q12H: Assess and document the continuing need for restraints  2. Q24H: Continued use of restraint requires LIP to perform face to face examination and written order  3. Identify and implement measures to help patient regain control  Outcome: PROGRESSING AS EXPECTED     Problem: Pain Management  Goal: Pain level will decrease to patient's comfort goal  Outcome: PROGRESSING AS EXPECTED     Problem: Psychosocial Needs:  Goal: Level of anxiety will decrease  Outcome: PROGRESSING AS EXPECTED     Problem: Respiratory:  Goal: Respiratory status will improve  Outcome: PROGRESSING AS EXPECTED     Problem: Safety:  Goal: Will remain free from injury  Outcome: PROGRESSING AS EXPECTED  Goal: Encourage identification and reduction of causative stimuli  Outcome: PROGRESSING AS EXPECTED     Problem: Psychosocial Needs:  Goal: Ability to verbalize feelings about condition will improve  Outcome: PROGRESSING AS EXPECTED  Goal: Ability to adjust to condition or change in health will improve  Outcome: PROGRESSING AS EXPECTED  Goal: Ability to utilize strategies to promote effective socialization will improve  Outcome: PROGRESSING AS EXPECTED     Problem: Physical Regulation:  Goal: Complications  related to the disease process, condition or treatment will be avoided or minimized  Outcome: PROGRESSING AS EXPECTED  Goal: Diagnostic test results will improve  Outcome: PROGRESSING AS EXPECTED     Problem: Medication:  Goal: Risk for medication side effects will decrease  Outcome: PROGRESSING AS EXPECTED  Goal: Patient's knowledge of medication regimen will improve  Outcome: PROGRESSING AS EXPECTED     Problem: Knowledge Deficit:  Goal: Knowledge of the prescribed therapeutic regimen will improve  Outcome: PROGRESSING AS EXPECTED     Problem: Health Behavior:  Goal: Ability to manage health-related needs will improve  Outcome: PROGRESSING AS EXPECTED     Problem: Mobility  Goal: Risk for activity intolerance will decrease  Outcome: PROGRESSING AS EXPECTED

## 2020-05-25 NOTE — PROGRESS NOTES
Critical Care Progress Note    Date of admission  5/13/2020    Chief Complaint  60 y.o. male admitted 5/13/2020 with a history of GBM and seizures.    Hospital Course   5/25 -    transfer to neurology.  Extubated yesterday.  Increase free water.  Taper Decadron.      Interval Problem Update  Reviewed last 24 hour events:      T-max 99.9  -2230 mL in the last 24 hours and -8364 mL since admit  Sodium 150  Replete K  RA  Taper decadron to 2 mg daily  Increase free water, 300 mL every 4 hours      Review of Systems  Review of Systems   Unable to perform ROS: Acuity of condition        Vital Signs for last 24 hours   Pulse:  [] 97  Resp:  [12-26] 25  BP: (106-143)/(59-89) 141/88  SpO2:  [90 %-99 %] 95 %    Hemodynamic parameters for last 24 hours       Respiratory Information for the last 24 hours       Physical Exam   Physical Exam  Constitutional:       General: He is not in acute distress.     Appearance: He is not toxic-appearing or diaphoretic.   HENT:      Head: Normocephalic and atraumatic.      Right Ear: External ear normal.      Left Ear: External ear normal.      Nose: Nose normal.      Mouth/Throat:      Mouth: Mucous membranes are moist.      Pharynx: Oropharynx is clear.   Eyes:      Extraocular Movements: Extraocular movements intact.      Conjunctiva/sclera: Conjunctivae normal.      Pupils: Pupils are equal, round, and reactive to light.   Neck:      Musculoskeletal: Normal range of motion and neck supple.   Cardiovascular:      Pulses: Normal pulses.      Heart sounds: No murmur. No gallop.       Comments: Sinus rhythm  Pulmonary:      Effort: No respiratory distress.      Breath sounds: No wheezing or rales.   Abdominal:      General: Bowel sounds are normal. There is no distension.      Palpations: Abdomen is soft.      Tenderness: There is no abdominal tenderness. There is no guarding.      Comments: Tolerating enteral tube feedings   Musculoskeletal:         General: No tenderness.       Comments: No clubbing or cyanosis   Skin:     General: Skin is warm and dry.      Capillary Refill: Capillary refill takes less than 2 seconds.   Neurological:      Comments: He is lethargic.  He slowly responds to questions.  He is weak on the right side.         Medications  Current Facility-Administered Medications   Medication Dose Route Frequency Provider Last Rate Last Dose   • [START ON 5/26/2020] dexamethasone (DECADRON) 1 MG/ML oral solution 2 mg  2 mg Oral DAILY Ahmet Lopez M.D.       • insulin regular (HUMULIN R) injection 1-6 Units  1-6 Units Subcutaneous Q6HRS Jeremy M Gonda, M.D.   2 Units at 05/25/20 1316    And   • dextrose 50% (D50W) injection 50 mL  50 mL Intravenous Q15 MIN PRN Jeremy M Gonda, M.D.       • potassium bicarbonate (KLYTE) effervescent tablet 50 mEq  50 mEq Enteral Tube DAILY Nik Jones M.D.   50 mEq at 05/25/20 0512   • lacosamide (VIMPAT) tablet 300 mg  300 mg Enteral Tube BID Nik Jones M.D.   300 mg at 05/25/20 0519   • levETIRAcetam (KEPPRA) tablet 1,500 mg  1,500 mg Enteral Tube BID Nik Jones M.D.   1,500 mg at 05/25/20 0512   • Valproate Sodium (DEPAKENE) oral solution 750 mg  750 mg Enteral Tube BID Nik Jones M.D.   750 mg at 05/25/20 0512   • amLODIPine (NORVASC) tablet 5 mg  5 mg Enteral Tube Q DAY Nik Jones M.D.   5 mg at 05/25/20 0512   • scopolamine (TRANSDERM-SCOP) patch 1 Patch  1 Patch Transdermal Q72HRS Nik Jones M.D.   1 Patch at 05/25/20 1311   • enoxaparin (LOVENOX) inj 40 mg  40 mg Subcutaneous DAILY Colton Perez M.D.   40 mg at 05/25/20 0512   • Pharmacy Consult: Enteral tube insertion - review meds/change route/product selection  1 Each Other PHARMACY TO DOSE Colton Perez M.D.       • losartan (COZAAR) tablet 100 mg  100 mg Enteral Tube DAILY Colton Perez M.D.   100 mg at 05/24/20 1715   • topiramate (TOPAMAX) tablet 200 mg  200 mg Enteral Tube Q12HRS Colton Perez M.D.   200 mg at 05/25/20 0512   •  acetaminophen (TYLENOL) tablet 650 mg  650 mg Enteral Tube Q6HRS PRN Colton Perez M.D.   650 mg at 05/20/20 1937   • ondansetron (ZOFRAN ODT) dispertab 4 mg  4 mg Enteral Tube Q4HRS PRN Colton Perez M.D.       • promethazine (PHENERGAN) tablet 12.5-25 mg  12.5-25 mg Enteral Tube Q4HRS PRN Colton Perez M.D.       • Pharmacy Consult Request ...Pain Management Review 1 Each  1 Each Other PHARMACY TO DOSE Colton Perez M.D.        And   • oxyCODONE immediate-release (ROXICODONE) tablet 2.5 mg  2.5 mg Enteral Tube Q3HRS PRN Colton Perez M.D.        And   • oxyCODONE immediate-release (ROXICODONE) tablet 5 mg  5 mg Enteral Tube Q3HRS PRN Colton Perez M.D.       • Respiratory Therapy Consult   Nebulization Continuous RT Nikhil Erickson D.O.       • ipratropium-albuterol (DUONEB) nebulizer solution  3 mL Nebulization Q2HRS PRN (RT) Nikhil Erickson D.O.       • senna-docusate (PERICOLACE or SENOKOT S) 8.6-50 MG per tablet 2 Tab  2 Tab Enteral Tube BID NICK ReddyO.   2 Tab at 05/25/20 0512    And   • polyethylene glycol/lytes (MIRALAX) PACKET 1 Packet  1 Packet Enteral Tube QDAY PRN NICK ReddyO.   1 Packet at 05/23/20 0648    And   • magnesium hydroxide (MILK OF MAGNESIA) suspension 30 mL  30 mL Enteral Tube QDAY PRN NICK ReddyO.   30 mL at 05/24/20 0510    And   • bisacodyl (DULCOLAX) suppository 10 mg  10 mg Rectal QDAY PRN NICK ReddyO.   10 mg at 05/25/20 0512   • MD Alert...ICU Electrolyte Replacement per Pharmacy   Other PHARMACY TO DOSE Nikhil Erickson D.O.       • ondansetron (ZOFRAN) syringe/vial injection 4 mg  4 mg Intravenous Q4HRS PRN Igor Carlos M.D.       • promethazine (PHENERGAN) suppository 12.5-25 mg  12.5-25 mg Rectal Q4HRS PRN Igor Carlos M.D.       • prochlorperazine (COMPAZINE) injection 5-10 mg  5-10 mg Intravenous Q4HRS PRN Igor Carlos M.D.       • LORazepam (ATIVAN) injection 4 mg  4 mg Intravenous Q10 MIN PRN Igor Carlos M.D.   4 mg at 05/15/20 1545   • hydrALAZINE  (APRESOLINE) injection 10 mg  10 mg Intravenous Q6HRS LUL Pierre M.D.   10 mg at 05/22/20 0208       Fluids    Intake/Output Summary (Last 24 hours) at 5/25/2020 1732  Last data filed at 5/25/2020 1600  Gross per 24 hour   Intake 1260 ml   Output 3790 ml   Net -2530 ml       Laboratory  Recent Labs     05/23/20  0306 05/24/20  0512   ISTATAPH 7.384* 7.434   ISTATAPCO2 33.2 31.5   ISTATAPO2 113* 62*   ISTATATCO2 21 22   NIUTMHA5TZP 98 92*   ISTATARTHCO3 19.8 21.1   ISTATARTBE -5* -2   ISTATTEMP 98.1 F 98.4 F   ISTATFIO2 30 30   ISTATSPEC Arterial Arterial   ISTATAPHTC 7.388* 7.436   [id number] 111* 62*         Recent Labs     05/23/20  0255 05/24/20  0206 05/25/20  0015   SODIUM 149* 150* 150*   POTASSIUM 3.8 3.7 3.7   CHLORIDE 119* 115* 115*   CO2 21 23 24   BUN 55* 54* 52*   CREATININE 0.74 0.81 0.81   CALCIUM 9.0 9.4 9.4     Recent Labs     05/23/20  0255 05/24/20  0206 05/25/20  0015   ALTSGPT  --   --  81*   ASTSGOT  --   --  26   ALKPHOSPHAT  --   --  40   TBILIRUBIN  --   --  0.4   GLUCOSE 203* 213* 208*     Recent Labs     05/23/20  0255 05/24/20  0206 05/25/20  0015   WBC 9.8 10.2 12.1*   NEUTSPOLYS 69.80 80.90* 80.20*   LYMPHOCYTES 16.40* 10.40* 7.80*   MONOCYTES 9.50 6.10 2.60   EOSINOPHILS 0.00 0.00 0.90   BASOPHILS 0.00 0.90 0.00   ASTSGOT  --   --  26   ALTSGPT  --   --  81*   ALKPHOSPHAT  --   --  40   TBILIRUBIN  --   --  0.4     Recent Labs     05/23/20  0255 05/24/20  0206 05/25/20  0015   RBC 4.04* 3.90* 4.26*   HEMOGLOBIN 12.5* 12.3* 13.3*   HEMATOCRIT 40.6* 39.2* 41.5*   PLATELETCT 162* 163* 170       Imaging  X-Ray:  I have personally reviewed the images and compared with prior images. and My impression is: The lungs are clear    Assessment/Plan  * Status epilepticus (HCC)- (present on admission)  Assessment & Plan  Status epilepticus resolved  Continue seizure precautions  Continue Vimpat, 300 mg twice daily  Continue Keppra, 1500 mg twice daily  Continue Topamax, 200 mg twice  daily  Continue valproic acid, 750 mg twice daily    Acute respiratory failure with hypoxia (HCC)  Assessment & Plan  Intubated 5/15-5/24  Resolving    GBM (glioblastoma multiforme) (HCC)- (present on admission)  Assessment & Plan  S/P resection at [facility] in February 2020  S/P chemoradiation  Taper Decadron, 2 mg daily    Hyperglycemia- (present on admission)  Assessment & Plan  Due to corticosteroids  Continue sliding scale insulin    Hypokalemia- (present on admission)  Assessment & Plan  Replete potassium    Essential hypertension- (present on admission)  Assessment & Plan  Goal SBP less than 160  Continue amlodipine, 5 mg daily  Continue losartan, 100 mg daily    Hypernatremia  Assessment & Plan  Increase free water, 300 mL every 4 hours       VTE:  Lovenox  Ulcer: Not Indicated  Lines: Central Line  Ongoing indication addressed and Novak Catheter  Ongoing indication addressed    I have performed a physical exam and reviewed and updated ROS and Plan today (5/25/2020). In review of yesterday's note (5/24/2020), there are no changes except as documented above.     OK to transfer out of ICU.  [facility] will sign off.  Please call if you have any questions.    Discussed patient condition and risk of morbidity and/or mortality with RN, RT, Pharmacy, Charge nurse / hot rounds and QA team     Ahmet Lopez MD  Pulmonary and Critical Care Medicine

## 2020-05-26 LAB
ANION GAP SERPL CALC-SCNC: 13 MMOL/L (ref 7–16)
BASOPHILS # BLD AUTO: 0.3 % (ref 0–1.8)
BASOPHILS # BLD: 0.07 K/UL (ref 0–0.12)
BUN SERPL-MCNC: 51 MG/DL (ref 8–22)
CALCIUM SERPL-MCNC: 9.7 MG/DL (ref 8.5–10.5)
CHLORIDE SERPL-SCNC: 118 MMOL/L (ref 96–112)
CO2 SERPL-SCNC: 19 MMOL/L (ref 20–33)
CREAT SERPL-MCNC: 0.78 MG/DL (ref 0.5–1.4)
EOSINOPHIL # BLD AUTO: 0.05 K/UL (ref 0–0.51)
EOSINOPHIL NFR BLD: 0.2 % (ref 0–6.9)
ERYTHROCYTE [DISTWIDTH] IN BLOOD BY AUTOMATED COUNT: 53.9 FL (ref 35.9–50)
GLUCOSE BLD-MCNC: 223 MG/DL (ref 65–99)
GLUCOSE BLD-MCNC: 246 MG/DL (ref 65–99)
GLUCOSE BLD-MCNC: 260 MG/DL (ref 65–99)
GLUCOSE SERPL-MCNC: 219 MG/DL (ref 65–99)
HCT VFR BLD AUTO: 43.8 % (ref 42–52)
HGB BLD-MCNC: 14 G/DL (ref 14–18)
IMM GRANULOCYTES # BLD AUTO: 0.24 K/UL (ref 0–0.11)
IMM GRANULOCYTES NFR BLD AUTO: 1.2 % (ref 0–0.9)
LYMPHOCYTES # BLD AUTO: 0.83 K/UL (ref 1–4.8)
LYMPHOCYTES NFR BLD: 4.1 % (ref 22–41)
MCH RBC QN AUTO: 31.2 PG (ref 27–33)
MCHC RBC AUTO-ENTMCNC: 32 G/DL (ref 33.7–35.3)
MCV RBC AUTO: 97.6 FL (ref 81.4–97.8)
MONOCYTES # BLD AUTO: 1.45 K/UL (ref 0–0.85)
MONOCYTES NFR BLD AUTO: 7.2 % (ref 0–13.4)
NEUTROPHILS # BLD AUTO: 17.58 K/UL (ref 1.82–7.42)
NEUTROPHILS NFR BLD: 87 % (ref 44–72)
NRBC # BLD AUTO: 0 K/UL
NRBC BLD-RTO: 0 /100 WBC
PLATELET # BLD AUTO: 195 K/UL (ref 164–446)
PMV BLD AUTO: 10.8 FL (ref 9–12.9)
POTASSIUM SERPL-SCNC: 3.5 MMOL/L (ref 3.6–5.5)
RBC # BLD AUTO: 4.49 M/UL (ref 4.7–6.1)
SODIUM SERPL-SCNC: 150 MMOL/L (ref 135–145)
WBC # BLD AUTO: 20.2 K/UL (ref 4.8–10.8)

## 2020-05-26 PROCEDURE — 700111 HCHG RX REV CODE 636 W/ 250 OVERRIDE (IP): Performed by: INTERNAL MEDICINE

## 2020-05-26 PROCEDURE — 700101 HCHG RX REV CODE 250: Performed by: INTERNAL MEDICINE

## 2020-05-26 PROCEDURE — 97530 THERAPEUTIC ACTIVITIES: CPT

## 2020-05-26 PROCEDURE — 99232 SBSQ HOSP IP/OBS MODERATE 35: CPT | Performed by: FAMILY MEDICINE

## 2020-05-26 PROCEDURE — A9270 NON-COVERED ITEM OR SERVICE: HCPCS | Performed by: PSYCHIATRY & NEUROLOGY

## 2020-05-26 PROCEDURE — A9270 NON-COVERED ITEM OR SERVICE: HCPCS | Performed by: INTERNAL MEDICINE

## 2020-05-26 PROCEDURE — 82962 GLUCOSE BLOOD TEST: CPT | Mod: 91

## 2020-05-26 PROCEDURE — 700102 HCHG RX REV CODE 250 W/ 637 OVERRIDE(OP): Performed by: INTERNAL MEDICINE

## 2020-05-26 PROCEDURE — 700105 HCHG RX REV CODE 258: Performed by: FAMILY MEDICINE

## 2020-05-26 PROCEDURE — 700102 HCHG RX REV CODE 250 W/ 637 OVERRIDE(OP): Performed by: FAMILY MEDICINE

## 2020-05-26 PROCEDURE — 700102 HCHG RX REV CODE 250 W/ 637 OVERRIDE(OP): Performed by: PSYCHIATRY & NEUROLOGY

## 2020-05-26 PROCEDURE — 92610 EVALUATE SWALLOWING FUNCTION: CPT

## 2020-05-26 PROCEDURE — 36415 COLL VENOUS BLD VENIPUNCTURE: CPT

## 2020-05-26 PROCEDURE — 80048 BASIC METABOLIC PNL TOTAL CA: CPT

## 2020-05-26 PROCEDURE — 770020 HCHG ROOM/CARE - TELE (206)

## 2020-05-26 PROCEDURE — 85025 COMPLETE CBC W/AUTO DIFF WBC: CPT

## 2020-05-26 RX ORDER — DEXAMETHASONE 0.5 MG/5ML
2 ELIXIR ORAL DAILY
Status: DISCONTINUED | OUTPATIENT
Start: 2020-05-26 | End: 2020-06-01

## 2020-05-26 RX ORDER — INSULIN GLARGINE 100 [IU]/ML
10 INJECTION, SOLUTION SUBCUTANEOUS EVERY EVENING
Status: DISCONTINUED | OUTPATIENT
Start: 2020-05-26 | End: 2020-05-27

## 2020-05-26 RX ORDER — DEXTROSE MONOHYDRATE 50 MG/ML
INJECTION, SOLUTION INTRAVENOUS CONTINUOUS
Status: DISCONTINUED | OUTPATIENT
Start: 2020-05-26 | End: 2020-05-27

## 2020-05-26 RX ADMIN — TOPIRAMATE 200 MG: 100 TABLET, FILM COATED ORAL at 04:37

## 2020-05-26 RX ADMIN — INSULIN GLARGINE 10 UNITS: 100 INJECTION, SOLUTION SUBCUTANEOUS at 17:36

## 2020-05-26 RX ADMIN — INSULIN HUMAN 2 UNITS: 100 INJECTION, SOLUTION PARENTERAL at 06:04

## 2020-05-26 RX ADMIN — AMLODIPINE BESYLATE 5 MG: 5 TABLET ORAL at 04:37

## 2020-05-26 RX ADMIN — DOCUSATE SODIUM 50 MG AND SENNOSIDES 8.6 MG 2 TABLET: 8.6; 5 TABLET, FILM COATED ORAL at 04:36

## 2020-05-26 RX ADMIN — DEXTROSE MONOHYDRATE: 50 INJECTION, SOLUTION INTRAVENOUS at 11:55

## 2020-05-26 RX ADMIN — LOSARTAN POTASSIUM 100 MG: 50 TABLET, FILM COATED ORAL at 17:18

## 2020-05-26 RX ADMIN — POTASSIUM BICARBONATE 50 MEQ: 978 TABLET, EFFERVESCENT ORAL at 04:36

## 2020-05-26 RX ADMIN — ENOXAPARIN SODIUM 40 MG: 100 INJECTION SUBCUTANEOUS at 04:37

## 2020-05-26 RX ADMIN — INSULIN HUMAN 3 UNITS: 100 INJECTION, SOLUTION PARENTERAL at 17:35

## 2020-05-26 RX ADMIN — INSULIN HUMAN 2 UNITS: 100 INJECTION, SOLUTION PARENTERAL at 23:57

## 2020-05-26 RX ADMIN — INSULIN HUMAN 2 UNITS: 100 INJECTION, SOLUTION PARENTERAL at 11:55

## 2020-05-26 RX ADMIN — LACOSAMIDE 300 MG: 100 TABLET, FILM COATED ORAL at 04:37

## 2020-05-26 RX ADMIN — LACOSAMIDE 300 MG: 100 TABLET, FILM COATED ORAL at 17:17

## 2020-05-26 RX ADMIN — LEVETIRACETAM 1500 MG: 500 TABLET ORAL at 17:17

## 2020-05-26 RX ADMIN — VALPROIC ACID 750 MG: 250 SOLUTION ORAL at 04:43

## 2020-05-26 RX ADMIN — TOPIRAMATE 200 MG: 100 TABLET, FILM COATED ORAL at 17:18

## 2020-05-26 RX ADMIN — LEVETIRACETAM 1500 MG: 500 TABLET ORAL at 04:37

## 2020-05-26 RX ADMIN — VALPROIC ACID 750 MG: 250 SOLUTION ORAL at 17:18

## 2020-05-26 RX ADMIN — DOCUSATE SODIUM 50 MG AND SENNOSIDES 8.6 MG 2 TABLET: 8.6; 5 TABLET, FILM COATED ORAL at 17:18

## 2020-05-26 RX ADMIN — DEXAMETHASONE 2 MG: 0.5 ELIXIR ORAL at 08:50

## 2020-05-26 ASSESSMENT — COGNITIVE AND FUNCTIONAL STATUS - GENERAL
EATING MEALS: TOTAL
DRESSING REGULAR LOWER BODY CLOTHING: TOTAL
SUGGESTED CMS G CODE MODIFIER DAILY ACTIVITY: CN
DAILY ACTIVITIY SCORE: 6
PERSONAL GROOMING: TOTAL
MOBILITY SCORE: 9
HELP NEEDED FOR BATHING: TOTAL
DRESSING REGULAR UPPER BODY CLOTHING: TOTAL
EATING MEALS: TOTAL
WALKING IN HOSPITAL ROOM: TOTAL
DRESSING REGULAR LOWER BODY CLOTHING: TOTAL
DAILY ACTIVITIY SCORE: 6
MOVING TO AND FROM BED TO CHAIR: A LOT
TOILETING: TOTAL
STANDING UP FROM CHAIR USING ARMS: TOTAL
MOVING FROM LYING ON BACK TO SITTING ON SIDE OF FLAT BED: A LOT
DRESSING REGULAR UPPER BODY CLOTHING: TOTAL
SUGGESTED CMS G CODE MODIFIER DAILY ACTIVITY: CN
TURNING FROM BACK TO SIDE WHILE IN FLAT BAD: A LOT
PERSONAL GROOMING: TOTAL
SUGGESTED CMS G CODE MODIFIER MOBILITY: CM
TOILETING: TOTAL
HELP NEEDED FOR BATHING: TOTAL
CLIMB 3 TO 5 STEPS WITH RAILING: TOTAL

## 2020-05-26 NOTE — PROGRESS NOTES
2 RN Skin Check    2 RN skin check complete with JERO Pierre.   Devices in place: SCDs, Cortrak and condom catheter.  Skin assessed under devices.  Back of head, shoulders, sacrum, heels pink and blanching with mild redness.   Scrotum is very edematous. Small round abrasion on anterior side of right scrotum.   Blood clots noted in urine collected via condom catheter. Will continue to monitor.   All other skin WDL.

## 2020-05-26 NOTE — CARE PLAN
Problem: Pain Management  Goal: Pain level will decrease to patient's comfort goal  Outcome: PROGRESSING AS EXPECTED  Intervention: Follow pain managment plan developed in collaboration with patient and Interdisciplinary Team  Note: Pt resting in bed, no distress noted.      Problem: Communication  Goal: The ability to communicate needs accurately and effectively will improve  Outcome: PROGRESSING SLOWER THAN EXPECTED  Intervention: [place] patient and significant other/support system to call light to alert staff of needs  Note: Pt lethargic, not following commands this morning. Continue to educate and communicate plan/ care with patient.

## 2020-05-26 NOTE — DISCHARGE PLANNING
LSW spoke with SO Jerrica regarding support. Jerrica states that she is pt's caregiver since he's been diagnosed and is at home 24/7. Jerrica also states that pt has a big family who are willing to supervise pt as needed.     Jerrica also stated that if rehab is an option, they would like to be a part of the planning. Jerrica asked for rehab to be closer to home.     LSW will assist as needed.

## 2020-05-26 NOTE — PROGRESS NOTES
Hospital Medicine Daily Progress Note    Date of Service  5/26/2020    Chief Complaint  60 y.o. male admitted 5/13/2020 with seizure    Hospital Course    60 y.o. male admitted 5/13/2020 with a history of GBM and seizures.    Interval Problem Update  Hypernatremia  Started him on iv fluid    Consultants/Specialty  Neurology  Critical care    Code Status  full    Disposition  pending    Review of Systems  Review of Systems   Unable to perform ROS: Acuity of condition        Physical Exam  Temp:  [36.4 °C (97.5 °F)-37.3 °C (99.2 °F)] 37.2 °C (99 °F)  Pulse:  [] 105  Resp:  [12-25] 19  BP: (120-143)/(69-98) 125/74  SpO2:  [93 %-98 %] 93 %    Physical Exam  Constitutional:       Appearance: He is ill-appearing. He is not toxic-appearing.   HENT:      Nose: Nose normal.      Comments: Ng tube in place     Mouth/Throat:      Mouth: Mucous membranes are dry.   Eyes:      Conjunctiva/sclera: Conjunctivae normal.   Neck:      Musculoskeletal: Normal range of motion.   Cardiovascular:      Rate and Rhythm: Tachycardia present.      Pulses: Normal pulses.      Heart sounds: Normal heart sounds.   Pulmonary:      Effort: Pulmonary effort is normal.      Breath sounds: Normal breath sounds.   Abdominal:      General: Abdomen is flat.      Palpations: Abdomen is soft.   Musculoskeletal:         General: No swelling or deformity.   Skin:     General: Skin is warm and dry.   Neurological:      Comments: None responsive at this time         Fluids    Intake/Output Summary (Last 24 hours) at 5/26/2020 1104  Last data filed at 5/26/2020 0815  Gross per 24 hour   Intake 2080 ml   Output 2100 ml   Net -20 ml       Laboratory  Recent Labs     05/24/20  0206 05/25/20  0015 05/26/20  0302   WBC 10.2 12.1* 20.2*   RBC 3.90* 4.26* 4.49*   HEMOGLOBIN 12.3* 13.3* 14.0   HEMATOCRIT 39.2* 41.5* 43.8   .5* 97.4 97.6   MCH 31.5 31.2 31.2   MCHC 31.4* 32.0* 32.0*   RDW 58.6* 53.7* 53.9*   PLATELETCT 163* 170 195   MPV 10.4 10.1 10.8      Recent Labs     05/24/20  0206 05/25/20  0015 05/26/20  0302   SODIUM 150* 150* 150*   POTASSIUM 3.7 3.7 3.5*   CHLORIDE 115* 115* 118*   CO2 23 24 19*   GLUCOSE 213* 208* 219*   BUN 54* 52* 51*   CREATININE 0.81 0.81 0.78   CALCIUM 9.4 9.4 9.7                   Imaging       Assessment/Plan  * Status epilepticus (HCC)- (present on admission)  Assessment & Plan  topomax  Vimpat, Keppra, Valproate   Neurology input is noted    GBM (glioblastoma multiforme) (HCC)- (present on admission)  Assessment & Plan  s/p Left parietal craniotomy with subtotal resection. 2/26/2020    MRI brain on 5/17/20 showed:1.  No acute hemorrhage or ischemia  2.  Two areas of enhancement in the LEFT parietal tumor resection bed which could be posttherapeutic change or indicative of residual or recurrent glioblastoma. Direct comparison with prior imaging would be helpful to further assess.  3.  3 mm of LEFT-to-RIGHT midline shift  4.  Atrophy  5.  White matter changes  Will d/w neurology with the mri findings    On  Decadron  Follow up with Oncology as outpatient    Hyperglycemia- (present on admission)  Assessment & Plan  Not well controlled  S.s.insulin  accu checks are noted  S.s.insulin  Added lantus    Leukocytosis- (present on admission)  Assessment & Plan  Likely secondary to steroids  Follow cbc    Hypokalemia- (present on admission)  Assessment & Plan  On kcl    Hyponatremia  Assessment & Plan  resolved      Essential hypertension- (present on admission)  Assessment & Plan  Losartan  amlodipine  IV Hydralazine as needed with parameters    Hypernatremia  Assessment & Plan  2nd to dehydration  Will start iv fluid d5 water  Will add free water 200 ml per tube feeding q8hrs       VTE prophylaxis: lovenox

## 2020-05-26 NOTE — PROGRESS NOTES
Pt is lethargic, not following commands this morning. Right sided weakness noted. Fall, seizure, and aspiration precautions in place.

## 2020-05-26 NOTE — THERAPY
Speech Language Pathology   Initial Assessment     Patient Name: Ahmet Escobedo  AGE:  60 y.o., SEX:  male  Medical Record #: [id number]  Today's Date: 5/26/2020       Precautions: Nasogastric Tube, Fall Risk  Comments: right side weakness    Subjective    Patient very sleepy, but did arouse for brief periods with eye opening. He was oriented to self only with confusion in all other spheres (given verbal choices of 2).  BSE completed.  He has not been seen by SLP since admit as he was intubated until 5/24.  Requested new orders today.       Objective       05/26/20 1206   Precautions   Precautions Nasogastric Tube;Fall Risk   Comments right side weakness   Vitals   O2 Delivery Device None - Room Air   Oral Motor Eval    Is Patient Able to Complete Oral Motor Eval Yes but Impaired   Labial Function   Labial Structure At Rest Minimal  (right side asymmetry)   Labial Vowel Production / I /, / U / Minimal   Labial Sequence / I /, / U /   (not able to follow)   Frown, Pucker   (not able to follow)   Lingual Function   Lingual Structure At Rest Within Functional Limits   Lingual Protrude Minimal   Lingual Retract   (not able to follow)   Elevate In Mouth   (not able to follow)   Elevate Outside Mouth   (not able to follow)   Lateralization Moderate Right;Moderate Left  (labored)   Lick Lips (Circular)   (not able to follow)   Lick Palate   (not able to follow)   / Pa / 5X's Minimal   / Ta / 5X's Minimal   / Ka / 5X's Moderate  (slow, imprecise--)   / [place] / 5X's   (not able to follow)   Jaw   Jaw Structure At Rest Within Functional Limits   Bite ([place]) Within Functional Limits   Chew ([place]) Not Tested   Jaw Open / Resist Within Functional Limits   Jaw Close / Resist Within Functional Limits   Laryngeal Function   Voice Quality Moderate  (hypophonic, weak)   Volutional Cough Minimal  (spontaneous cough)   Excursion Upon Swallow Weak    Max Phonation Time (Seconds) not able to follow   Oral Food Presentation   Ice Chips  "Moderate   Single Swallow Mildly Thick (2) - (Nectar Thick)  Not Tested   Single Swallow Thin (0) Not Tested   Pureed (4) Not Tested   Self Feeding Needs Total Assistance   Dysphagia Strategies / Recommendations   Strategies / Interventions Recommended (Yes / No) Yes   Compensatory Strategies To Be Assessed   Diet / Liquid Recommendation NPO;Pre-Feeding Trials with SLP Only   Medication Administration  Via Gastric Tube   Therapy Interventions Dysphagia Therapy By Speech Language Pathologist;Pharyngeal / Laryngeal Exercises;Oral Motor Exercises   Follow Up SLP Evaluation SLP to follow for dysphagia therapy and for cognitive eval as patient is able   Dysphagia Rating   Nutritional Liquid Intake Rating Scale Nothing by mouth   Nutritional Food Intake Rating Scale Nothing by mouth   Short Term Goals   Short Term Goal # 1 Pt will consume prefeeding trials with SLP only with no overt S/Sx of aspiration noted.    Short Term Goal # 2 Pt will be able to follow single step oral motor exercises with max cues and >50% accuracy.   Problem List   Problem List Dysphagia;Expressive Language Deficit;Receptive Language Deficit;Communication Deficits       Assessment    Patient is a 59 y/o male with history of GBM s/p resection at [facility] in February 2020. He was admitted with recurrent seizures. He was having right side convulsive movements and slurred speech.  He was intubated 5/15-5/24 and has had multiple abnormal EEGs.  MRI 5/17: \"1.  No acute hemorrhage or ischemia 2.  Two areas of enhancement in the LEFT parietal tumor resection bed which could be posttherapeutic change or indicative of residual or recurrent glioblastoma. Direct comparison with prior imaging would be helpful to further assess. 3.  3 mm of LEFT-to-RIGHT midline shift.\"  PMH also includes HTN.     Bedside swallow eval completed.  Patient oriented to name only with confusion in all other spheres.  He was very sleepy during the eval with intermittent eye opening " noted. He was able to follow single step commands about 25% of the time with max cues.  Single ice chips were given for PO trials and patient with delayed initiation of oral phase with delayed onset of swallow.  Laryngeal elevation was noted to be weak and he had audible upper airway congestion with delayed coughing noted.  Due to aspiration risk, no further trials were given. He is not at a level for PO intake at this time.  SLP will continue to follow.     Plan    1) NPO with cortrak  2) Aggressive oral care please  3) SLP to follow for dysphagia therapy  4) Cognitive/speech eval as appropriate.      Recommend Speech Therapy 3 times per week until therapy goals are met for the following treatments:  Dysphagia Training, Comprehension Training, Expression Training, Reading Training, Writing Training, Cognitive-Linguistic Training and Patient / Family / Caregiver Education.  Can increase frequency as patient is able.     Discharge recommendations:  Recommend inpatient transitional care services for continued speech therapy services.

## 2020-05-26 NOTE — ASSESSMENT & PLAN NOTE
2nd to dehydration  Will start iv fluid d5 water  Will add free water 200 ml per tube feeding q8hrs

## 2020-05-26 NOTE — CARE PLAN
Problem: Safety  Goal: Will remain free from injury  Outcome: PROGRESSING AS EXPECTED  Note: Bed alarm on, wheels locked, in lowest position. Non skid socks applied. Call light within reach. Q 2 hour turns, range of motion, and checks in place.      Problem: Pain Management  Goal: Pain level will decrease to patient's comfort goal  Outcome: PROGRESSING AS EXPECTED  Note: Pt has no complaints of pain and is resting comfortably.

## 2020-05-26 NOTE — THERAPY
"Occupational Therapy  Daily Treatment     Patient Name: Ahmet Escobedo  Age:  60 y.o., Sex:  male  Medical Record #: [id number]  Today's Date: 5/26/2020     Precautions  Precautions: Nasogastric Tube, Fall Risk  Comments: right side weakness    Subjective    \"Hi!\"     Assessment    Still most limited by lethargy and impaired cognition.       05/26/20 1146   Cognition    Cognition / Consciousness X   Level of Consciousness Lethargic   Comments Was able to say \"Hi!\" at beginning of session with decreased command following throughout.   Balance   Sitting Balance (Static) Poor -   Sitting Balance (Dynamic) Trace +   Weight Shift Sitting Poor   Skilled Intervention Verbal Cuing;Tactile Cuing;Sequencing;Postural Facilitation;Compensatory Strategies;Facilitation   Bed Mobility    Supine to Sit Total Assist   Sit to Supine Total Assist   Scooting Total Assist   Activities of Daily Living   Grooming Total Assist;Seated   Lower Body Dressing Total Assist   Functional Mobility   Sit to Stand Unable to Participate   Mobility EOB only   Patient / Family Goals   Patient / Family Goal #1 unable to state    Short Term Goals   Short Term Goal # 1 pt will follow 1 step commands 100% of the time   Goal Outcome # 1 Progressing slower than expected   Short Term Goal # 2 pt will participate in oral care w/min A    Goal Outcome # 2 Progressing slower than expected   Short Term Goal # 3 pt will sit EOB in midline w/min A for at least 10 min    Goal Outcome # 3 Progressing slower than expected   Anticipated Discharge Equipment   DC Equipment Unable To Determine At This Time       Plan    Continue current treatment plan.    Discharge recommendations:  Recommend post-acute placement for additional occupational therapy services prior to discharge home.    "

## 2020-05-26 NOTE — THERAPY
PT tx attempted; pt had just worked with OT and was actively working with SLP. Will re-attempt as able.

## 2020-05-26 NOTE — PROGRESS NOTES
Monitor summary: SR/ST , AK 0.16, QRS 0.10, QT 0.32, with rare  PACs, PVCs and BBB beats per strip from monitor room.

## 2020-05-26 NOTE — RESPIRATORY CARE
COPD EDUCATION by COPD CLINICAL EDUCATOR  5/26/2020 at 7:33 AM by Maggie Hennessy, RRT     Patient reviewed by COPD education team. Patient does not have a history or diagnosis of COPD and is a non-smoker, therefore does not qualify for the COPD program.

## 2020-05-27 ENCOUNTER — APPOINTMENT (OUTPATIENT)
Dept: RADIOLOGY | Facility: MEDICAL CENTER | Age: 60
DRG: 100 | End: 2020-05-27
Attending: INTERNAL MEDICINE
Payer: OTHER MISCELLANEOUS

## 2020-05-27 PROBLEM — E87.1 HYPONATREMIA: Status: RESOLVED | Noted: 2020-05-15 | Resolved: 2020-05-27

## 2020-05-27 PROBLEM — G93.40 ENCEPHALOPATHY ACUTE: Status: ACTIVE | Noted: 2020-05-27

## 2020-05-27 PROBLEM — E11.65 TYPE 2 DIABETES MELLITUS WITH HYPERGLYCEMIA, WITHOUT LONG-TERM CURRENT USE OF INSULIN (HCC): Status: ACTIVE | Noted: 2020-05-15

## 2020-05-27 PROBLEM — D72.825 BANDEMIA: Status: ACTIVE | Noted: 2020-05-15

## 2020-05-27 PROBLEM — E87.0 HYPERNATREMIA: Status: RESOLVED | Noted: 2020-05-25 | Resolved: 2020-05-27

## 2020-05-27 LAB
AMMONIA PLAS-SCNC: 55 UMOL/L (ref 11–45)
ANION GAP SERPL CALC-SCNC: 15 MMOL/L (ref 7–16)
APPEARANCE UR: ABNORMAL
BACTERIA #/AREA URNS HPF: ABNORMAL /HPF
BASOPHILS # BLD AUTO: 0.4 % (ref 0–1.8)
BASOPHILS # BLD: 0.08 K/UL (ref 0–0.12)
BILIRUB UR QL STRIP.AUTO: NEGATIVE
BUN SERPL-MCNC: 45 MG/DL (ref 8–22)
CALCIUM SERPL-MCNC: 9.6 MG/DL (ref 8.5–10.5)
CHLORIDE SERPL-SCNC: 111 MMOL/L (ref 96–112)
CO2 SERPL-SCNC: 19 MMOL/L (ref 20–33)
COLOR UR: YELLOW
CREAT SERPL-MCNC: 0.75 MG/DL (ref 0.5–1.4)
CRP SERPL HS-MCNC: 10.32 MG/DL (ref 0–0.75)
EOSINOPHIL # BLD AUTO: 0.04 K/UL (ref 0–0.51)
EOSINOPHIL NFR BLD: 0.2 % (ref 0–6.9)
EPI CELLS #/AREA URNS HPF: NEGATIVE /HPF
ERYTHROCYTE [DISTWIDTH] IN BLOOD BY AUTOMATED COUNT: 53.8 FL (ref 35.9–50)
ERYTHROCYTE [SEDIMENTATION RATE] IN BLOOD BY WESTERGREN METHOD: 52 MM/HOUR (ref 0–20)
GLUCOSE BLD-MCNC: 208 MG/DL (ref 65–99)
GLUCOSE BLD-MCNC: 210 MG/DL (ref 65–99)
GLUCOSE BLD-MCNC: 213 MG/DL (ref 65–99)
GLUCOSE BLD-MCNC: 239 MG/DL (ref 65–99)
GLUCOSE SERPL-MCNC: 248 MG/DL (ref 65–99)
GLUCOSE UR STRIP.AUTO-MCNC: NEGATIVE MG/DL
HCT VFR BLD AUTO: 42.8 % (ref 42–52)
HGB BLD-MCNC: 13.6 G/DL (ref 14–18)
HYALINE CASTS #/AREA URNS LPF: ABNORMAL /LPF
IMM GRANULOCYTES # BLD AUTO: 0.19 K/UL (ref 0–0.11)
IMM GRANULOCYTES NFR BLD AUTO: 0.9 % (ref 0–0.9)
KETONES UR STRIP.AUTO-MCNC: NEGATIVE MG/DL
LEUKOCYTE ESTERASE UR QL STRIP.AUTO: ABNORMAL
LYMPHOCYTES # BLD AUTO: 0.93 K/UL (ref 1–4.8)
LYMPHOCYTES NFR BLD: 4.3 % (ref 22–41)
MCH RBC QN AUTO: 30.9 PG (ref 27–33)
MCHC RBC AUTO-ENTMCNC: 31.8 G/DL (ref 33.7–35.3)
MCV RBC AUTO: 97.3 FL (ref 81.4–97.8)
MICRO URNS: ABNORMAL
MONOCYTES # BLD AUTO: 1.31 K/UL (ref 0–0.85)
MONOCYTES NFR BLD AUTO: 6 % (ref 0–13.4)
NEUTROPHILS # BLD AUTO: 19.32 K/UL (ref 1.82–7.42)
NEUTROPHILS NFR BLD: 88.2 % (ref 44–72)
NITRITE UR QL STRIP.AUTO: NEGATIVE
NRBC # BLD AUTO: 0 K/UL
NRBC BLD-RTO: 0 /100 WBC
PH UR STRIP.AUTO: 6 [PH] (ref 5–8)
PLATELET # BLD AUTO: 193 K/UL (ref 164–446)
PMV BLD AUTO: 10.9 FL (ref 9–12.9)
POTASSIUM SERPL-SCNC: 3.4 MMOL/L (ref 3.6–5.5)
PROCALCITONIN SERPL-MCNC: 0.21 NG/ML
PROT UR QL STRIP: 30 MG/DL
RBC # BLD AUTO: 4.4 M/UL (ref 4.7–6.1)
RBC # URNS HPF: ABNORMAL /HPF
RBC UR QL AUTO: ABNORMAL
SODIUM SERPL-SCNC: 145 MMOL/L (ref 135–145)
SP GR UR STRIP.AUTO: 1.02
UROBILINOGEN UR STRIP.AUTO-MCNC: 0.2 MG/DL
WBC # BLD AUTO: 21.9 K/UL (ref 4.8–10.8)
WBC #/AREA URNS HPF: ABNORMAL /HPF

## 2020-05-27 PROCEDURE — 700111 HCHG RX REV CODE 636 W/ 250 OVERRIDE (IP): Performed by: INTERNAL MEDICINE

## 2020-05-27 PROCEDURE — 82962 GLUCOSE BLOOD TEST: CPT | Mod: 91

## 2020-05-27 PROCEDURE — 770020 HCHG ROOM/CARE - TELE (206)

## 2020-05-27 PROCEDURE — 700117 HCHG RX CONTRAST REV CODE 255: Performed by: INTERNAL MEDICINE

## 2020-05-27 PROCEDURE — 700102 HCHG RX REV CODE 250 W/ 637 OVERRIDE(OP): Performed by: INTERNAL MEDICINE

## 2020-05-27 PROCEDURE — 87040 BLOOD CULTURE FOR BACTERIA: CPT | Mod: 91

## 2020-05-27 PROCEDURE — 70553 MRI BRAIN STEM W/O & W/DYE: CPT

## 2020-05-27 PROCEDURE — 85025 COMPLETE CBC W/AUTO DIFF WBC: CPT

## 2020-05-27 PROCEDURE — 700102 HCHG RX REV CODE 250 W/ 637 OVERRIDE(OP): Performed by: PSYCHIATRY & NEUROLOGY

## 2020-05-27 PROCEDURE — 81001 URINALYSIS AUTO W/SCOPE: CPT

## 2020-05-27 PROCEDURE — A9576 INJ PROHANCE MULTIPACK: HCPCS | Performed by: INTERNAL MEDICINE

## 2020-05-27 PROCEDURE — A9270 NON-COVERED ITEM OR SERVICE: HCPCS | Performed by: INTERNAL MEDICINE

## 2020-05-27 PROCEDURE — 95819 EEG AWAKE AND ASLEEP: CPT | Mod: 26 | Performed by: PSYCHIATRY & NEUROLOGY

## 2020-05-27 PROCEDURE — 700105 HCHG RX REV CODE 258: Performed by: FAMILY MEDICINE

## 2020-05-27 PROCEDURE — 87077 CULTURE AEROBIC IDENTIFY: CPT

## 2020-05-27 PROCEDURE — 87086 URINE CULTURE/COLONY COUNT: CPT

## 2020-05-27 PROCEDURE — 86140 C-REACTIVE PROTEIN: CPT

## 2020-05-27 PROCEDURE — 97530 THERAPEUTIC ACTIVITIES: CPT | Mod: CQ

## 2020-05-27 PROCEDURE — 36415 COLL VENOUS BLD VENIPUNCTURE: CPT

## 2020-05-27 PROCEDURE — 4A10X4Z MONITORING OF CENTRAL NERVOUS ELECTRICAL ACTIVITY, EXTERNAL APPROACH: ICD-10-PCS | Performed by: PSYCHIATRY & NEUROLOGY

## 2020-05-27 PROCEDURE — 71045 X-RAY EXAM CHEST 1 VIEW: CPT

## 2020-05-27 PROCEDURE — 700101 HCHG RX REV CODE 250: Performed by: INTERNAL MEDICINE

## 2020-05-27 PROCEDURE — 87186 SC STD MICRODIL/AGAR DIL: CPT

## 2020-05-27 PROCEDURE — 95819 EEG AWAKE AND ASLEEP: CPT | Performed by: PSYCHIATRY & NEUROLOGY

## 2020-05-27 PROCEDURE — 80048 BASIC METABOLIC PNL TOTAL CA: CPT

## 2020-05-27 PROCEDURE — 84145 PROCALCITONIN (PCT): CPT

## 2020-05-27 PROCEDURE — 85652 RBC SED RATE AUTOMATED: CPT

## 2020-05-27 PROCEDURE — A9270 NON-COVERED ITEM OR SERVICE: HCPCS | Performed by: PSYCHIATRY & NEUROLOGY

## 2020-05-27 PROCEDURE — 82140 ASSAY OF AMMONIA: CPT

## 2020-05-27 PROCEDURE — 99233 SBSQ HOSP IP/OBS HIGH 50: CPT | Performed by: INTERNAL MEDICINE

## 2020-05-27 PROCEDURE — 700105 HCHG RX REV CODE 258: Performed by: INTERNAL MEDICINE

## 2020-05-27 RX ORDER — INSULIN GLARGINE 100 [IU]/ML
10 INJECTION, SOLUTION SUBCUTANEOUS EVERY EVENING
Status: DISCONTINUED | OUTPATIENT
Start: 2020-05-27 | End: 2020-05-30

## 2020-05-27 RX ORDER — POLYETHYLENE GLYCOL 3350 17 G/17G
1 POWDER, FOR SOLUTION ORAL 2 TIMES DAILY
Status: DISCONTINUED | OUTPATIENT
Start: 2020-05-27 | End: 2020-05-28

## 2020-05-27 RX ORDER — DEXTROSE MONOHYDRATE 25 G/50ML
50 INJECTION, SOLUTION INTRAVENOUS
Status: DISCONTINUED | OUTPATIENT
Start: 2020-05-27 | End: 2020-05-30

## 2020-05-27 RX ORDER — SODIUM CHLORIDE, SODIUM LACTATE, POTASSIUM CHLORIDE, CALCIUM CHLORIDE 600; 310; 30; 20 MG/100ML; MG/100ML; MG/100ML; MG/100ML
INJECTION, SOLUTION INTRAVENOUS CONTINUOUS
Status: DISCONTINUED | OUTPATIENT
Start: 2020-05-27 | End: 2020-05-30

## 2020-05-27 RX ADMIN — TOPIRAMATE 200 MG: 100 TABLET, FILM COATED ORAL at 04:33

## 2020-05-27 RX ADMIN — INSULIN LISPRO 3 UNITS: 100 INJECTION, SOLUTION INTRAVENOUS; SUBCUTANEOUS at 17:25

## 2020-05-27 RX ADMIN — INSULIN HUMAN 2 UNITS: 100 INJECTION, SOLUTION PARENTERAL at 04:47

## 2020-05-27 RX ADMIN — ENOXAPARIN SODIUM 40 MG: 100 INJECTION SUBCUTANEOUS at 04:33

## 2020-05-27 RX ADMIN — LACOSAMIDE 300 MG: 100 TABLET, FILM COATED ORAL at 04:33

## 2020-05-27 RX ADMIN — INSULIN HUMAN 2 UNITS: 100 INJECTION, SOLUTION PARENTERAL at 12:12

## 2020-05-27 RX ADMIN — PIPERACILLIN SODIUM AND TAZOBACTAM SODIUM 3.38 G: 3; .375 INJECTION, POWDER, FOR SOLUTION INTRAVENOUS at 19:43

## 2020-05-27 RX ADMIN — INSULIN GLARGINE 10 UNITS: 100 INJECTION, SOLUTION SUBCUTANEOUS at 17:25

## 2020-05-27 RX ADMIN — AMLODIPINE BESYLATE 5 MG: 5 TABLET ORAL at 04:38

## 2020-05-27 RX ADMIN — LEVETIRACETAM 1500 MG: 500 TABLET ORAL at 17:16

## 2020-05-27 RX ADMIN — LACOSAMIDE 300 MG: 100 TABLET, FILM COATED ORAL at 17:16

## 2020-05-27 RX ADMIN — GADOTERIDOL 18 ML: 279.3 INJECTION, SOLUTION INTRAVENOUS at 15:38

## 2020-05-27 RX ADMIN — DEXAMETHASONE 2 MG: 0.5 ELIXIR ORAL at 04:34

## 2020-05-27 RX ADMIN — POTASSIUM BICARBONATE 50 MEQ: 978 TABLET, EFFERVESCENT ORAL at 04:33

## 2020-05-27 RX ADMIN — DEXTROSE MONOHYDRATE: 50 INJECTION, SOLUTION INTRAVENOUS at 00:06

## 2020-05-27 RX ADMIN — VALPROIC ACID 750 MG: 250 SOLUTION ORAL at 04:34

## 2020-05-27 RX ADMIN — TOPIRAMATE 200 MG: 100 TABLET, FILM COATED ORAL at 17:17

## 2020-05-27 RX ADMIN — PIPERACILLIN AND TAZOBACTAM 3.38 G: 3; .375 INJECTION, POWDER, LYOPHILIZED, FOR SOLUTION INTRAVENOUS; PARENTERAL at 13:15

## 2020-05-27 RX ADMIN — SODIUM CHLORIDE, POTASSIUM CHLORIDE, SODIUM LACTATE AND CALCIUM CHLORIDE: 600; 310; 30; 20 INJECTION, SOLUTION INTRAVENOUS at 14:05

## 2020-05-27 RX ADMIN — VALPROIC ACID 750 MG: 250 SOLUTION ORAL at 17:43

## 2020-05-27 RX ADMIN — LEVETIRACETAM 1500 MG: 500 TABLET ORAL at 04:33

## 2020-05-27 RX ADMIN — LOSARTAN POTASSIUM 100 MG: 50 TABLET, FILM COATED ORAL at 17:18

## 2020-05-27 ASSESSMENT — COGNITIVE AND FUNCTIONAL STATUS - GENERAL
TURNING FROM BACK TO SIDE WHILE IN FLAT BAD: A LOT
MOBILITY SCORE: 9
STANDING UP FROM CHAIR USING ARMS: TOTAL
WALKING IN HOSPITAL ROOM: TOTAL
MOVING TO AND FROM BED TO CHAIR: A LOT
SUGGESTED CMS G CODE MODIFIER MOBILITY: CM
MOVING FROM LYING ON BACK TO SITTING ON SIDE OF FLAT BED: A LOT
CLIMB 3 TO 5 STEPS WITH RAILING: TOTAL

## 2020-05-27 ASSESSMENT — GAIT ASSESSMENTS: GAIT LEVEL OF ASSIST: UNABLE TO PARTICIPATE

## 2020-05-27 NOTE — PROCEDURES
VIDEO ELECTROENCEPHALOGRAM REPORT        Referring provider: Dr. Pool.      DOS: 5/27/2020 (total recording of 24 minutes).      INDICATION:  Ahmet Escobedo 60 y.o. male presenting with h/o brain tumor, seizures.      CURRENT ANTIEPILEPTIC REGIMEN: Levetiracetam 1500 mg q 12 hrs, Lacosamide 300 mg q 12 hrs, Valproic Acid 750 mg q 12 hrs, and Topiramate 200 mg q 12 hrs.      TECHNIQUE: 30 channel video electroencephalogram (EEG) was performed in accordance with the international 10-20 system. The study was reviewed in bipolar and referential montages. The recording examined a patient during wakeful, drowsy, and sleep states.      DESCRIPTION OF THE RECORD:  During the awake state, the background showed an asymmetric 7 hz theta activity on the right. There is slowing on the left cerebral hemisphere. There are frequent sharps in the left posterior quadrant, intermittently becoming briefly periodic. No seizures captured during the study.     During the drowsy state, theta/delta activity was seen.     During the sleep state, higher amplitude delta activity noted.       ACTIVATION PROCEDURES:   Not performed.      EKG: sampling of the EKG recording demonstrated sinus rhythm.      EVENTS: None.      INTERPRETATION:  This is an abnormal routine EEG recording in the awake, drowsy, and sleep states. There is slowing in the left hemisphere. Frequent left posterior quadrant sharps noted with brief runs of left lateralized periodic discharges. No  seizures captured.  The patient remains at high risk for seizure recurrence. The findings suggest a large underlying area of cortical irritability and structural abnormality. Clinical and radiological correlation is recommended.        Davion Wise MD   Epilepsy and Neurodiagnostics.   Clinical  of Neurology [facility].   Diplomate in Neurology, Epilepsy, and Electrodiagnostic Medicine.   Office: [phone]  Fax:  [phone]

## 2020-05-27 NOTE — CARE PLAN
Problem: Communication  Goal: The ability to communicate needs accurately and effectively will improve  Outcome: PROGRESSING SLOWER THAN EXPECTED  Note:   Pt. Is A&O x 0. RICHIE as the pt. Is unresponsive. Follows commands and responds appropriately. Will continue to round hourly and encourage the Pt. To ask questions and voice feelings.            Problem: Safety  Goal: Will remain free from injury  Outcome: PROGRESSING AS EXPECTED  Note: Pt is a max assist. Q2 turns in place & waffle mattress in place. Room has adequate lighting, is free of clutter, call light is within reach, and bed is locked and in the lowest position. Will continue to hourly round.

## 2020-05-27 NOTE — CARE PLAN
Problem: Safety  Goal: Will remain free from falls  Outcome: PROGRESSING AS EXPECTED   The pt has the high fall risk preventative measure list in place: pt has a bed alarm on, he is next to the nursing station, and yellow socks.     Problem: Venous Thromboembolism (VTW)/Deep Vein Thrombosis (DVT) Prevention:  Goal: Patient will participate in Venous Thrombosis (VTE)/Deep Vein Thrombosis (DVT)Prevention Measures  Outcome: PROGRESSING AS EXPECTED   The pt has his SCDs in place and is getting anticoagulant medication. ROM exercises are performed.     Problem: Urinary Elimination:  Goal: Ability to reestablish a normal urinary elimination pattern will improve  Outcome: PROGRESSING AS EXPECTED   The pt is urinating at a normal rate.

## 2020-05-27 NOTE — PROGRESS NOTES
Monitor summary: sinus rhythm/sinus tach rate , NE .20, QRS .10, QT .28, with PCVs and PACs per strip from monitor room.

## 2020-05-27 NOTE — ASSESSMENT & PLAN NOTE
Stable now  Patient has intermittent episode of hypoxemia concerning for obstructive sleep apnea, could be potentially central in etiology too  Continue close pulse oximetry and oxygen support as needed

## 2020-05-27 NOTE — PROGRESS NOTES
Hospital Medicine Daily Progress Note    Date of Service  5/27/2020    Chief Complaint  60 y.o. male admitted 5/13/2020 with seizure    Hospital Course  60 y.o. male admitted 5/13/2020 with a history of GBM and seizures.  Hospitalization complicated by status epilepticus, requiring ICU stay.    Interval Problem Update  Patient seen and evaluated on rounds  Discussed with nursing staff on rounds    Patient on my evaluation is increasingly somnolent today, opens eyes to pain, incomprehensible inappropriate words, withdraws from pain.  Pupillary responses noted.    Patient has testicular swelling, increased to the right side.  Noted finding of hydrocele.    Orders discussed with the patient, case discussed with Dr. Zhao. Per Dr Zhao previously following commands in the ICU.    On scopolamine patch, unknown reason.  This has been stopped by me.    On valproate,?  Hyperammonemia.    AED therapy, titration per neurology team.    Evaluate for infectious concerns, discussed with nursing.  Empiric antibiotic therapy to be initiated.    Continue gentle fluid hydration.    I called patient's significant other Jerrica, she put me on a conference call with all daughters including BrittanyTanvi, Linh.  They would like Linh [phone] to be updated for any emergent concerns.  They would appreciate daily updates.  Plan of care discussed in detail with them, all questions and concerns personally answered by me.    Later called Sudhir, updated that our hospitalist RNs would be updating them daily.  In addition I would update them daily, hospitalist update daily also.  In addition informed and updated her regarding the MRI results.    Palliative care consultation requested by me    Consultants/Specialty  Neurology  Pulmonology/critical care  Palliative care    Code Status  Full code    Disposition  Will need aggressive PT, OT, SLP evaluations determine disposition planning once somnolence has improved.    Review of Systems  Review  of Systems   Unable to perform ROS: Acuity of condition        Physical Exam  Temp:  [37 °C (98.6 °F)-37.6 °C (99.7 °F)] 37 °C (98.6 °F)  Pulse:  [] 95  Resp:  [18-25] 25  BP: (125-149)/(89-99) 125/89  SpO2:  [93 %-96 %] 96 %    Physical Exam  Constitutional:       Appearance: He is obese. He is diaphoretic. He is not ill-appearing or toxic-appearing.      Comments: Slightly diaphoretic, somnolent  Body mass index is 32.25 kg/m².   HENT:      Head: Normocephalic.      Nose: Nose normal.      Comments: Ng tube in place     Mouth/Throat:      Mouth: Mucous membranes are moist.   Eyes:      Conjunctiva/sclera: Conjunctivae normal.   Neck:      Musculoskeletal: Normal range of motion.   Cardiovascular:      Rate and Rhythm: Normal rate.      Pulses: Normal pulses.      Heart sounds: Normal heart sounds. No murmur.   Pulmonary:      Effort: Pulmonary effort is normal.      Breath sounds: Normal breath sounds. No rhonchi or rales.      Comments: Diminished in bases  Chest:      Chest wall: No tenderness.   Abdominal:      General: Abdomen is flat. Bowel sounds are normal. There is no distension.      Palpations: Abdomen is soft.      Tenderness: There is no abdominal tenderness. There is no right CVA tenderness, left CVA tenderness, guarding or rebound.   Genitourinary:     Comments: Patient has testicular swelling, increased to the right side  Musculoskeletal:         General: No swelling or deformity.      Right lower leg: No edema.      Left lower leg: No edema.   Skin:     General: Skin is warm.   Neurological:      Comments: Somnolent, GCS is 8-9.  Pupil responsive.   Psychiatric:      Comments: Somnolent.         Fluids    Intake/Output Summary (Last 24 hours) at 5/27/2020 1628  Last data filed at 5/27/2020 1522  Gross per 24 hour   Intake 1600 ml   Output 2150 ml   Net -550 ml       Laboratory  Recent Labs     05/25/20  0015 05/26/20  0302 05/27/20  0428   WBC 12.1* 20.2* 21.9*   RBC 4.26* 4.49* 4.40*    HEMOGLOBIN 13.3* 14.0 13.6*   HEMATOCRIT 41.5* 43.8 42.8   MCV 97.4 97.6 97.3   MCH 31.2 31.2 30.9   MCHC 32.0* 32.0* 31.8*   RDW 53.7* 53.9* 53.8*   PLATELETCT 170 195 193   MPV 10.1 10.8 10.9     Recent Labs     05/25/20  0015 05/26/20  0302 05/27/20  0431   SODIUM 150* 150* 145   POTASSIUM 3.7 3.5* 3.4*   CHLORIDE 115* 118* 111   CO2 24 19* 19*   GLUCOSE 208* 219* 248*   BUN 52* 51* 45*   CREATININE 0.81 0.78 0.75   CALCIUM 9.4 9.7 9.6                   Imaging       Assessment/Plan  * Encephalopathy acute- (present on admission)  Assessment & Plan  I discussed with the family 05/27/20  Patient independent relatively with ADLs, IADLs prior to presentation  Now increasingly somnolent  GCS today is 8-9    Etiologies considered include: Polypharmacy/pharmacological/iatrogenic, infectious, structural CNS related, recurrent seizures, anoxic brain injury from status epilepticus/others.    At this time I have stopped the scopolamine patch which can be sedated    We will monitor the patient off scopolamine patch, if failure to improve will need review of antiepileptic drug therapy to see if contributing.  I have discussed with Dr. Zhao from neurology and he is in agreement.  Also to note, patient is on valproic acid, it is pertinent to check an ammonia level to ensure it is not causing hyperammonemia.    We will obtain infectious work-up as reviewed in leukocytosis below, empiric management with IV Zosyn for now.    Interval MRI of the brain with and without contrast.    An interval EEG to see if any concerns for recurrent seizures.    Neurology team will also evaluate the patient    Maintain fluid hydration, avoid development of metabolic issues including electrolyte abnormalities.  Closely monitor sodium levels.    TSH within normal limit, check B12 and folate.    Maintain strict aspiration, fall and seizure precautions.    Bandemia- (present on admission)  Assessment & Plan  Likely secondary to steroids  Unable  to rule out any infectious process  I will be sending infectious work-up including a chest x-ray, urinalysis, blood cultures, ESR, CRP, procalcitonin  We will repeat CBC, ESR, CRP and procalcitonin tomorrow  For now I will initiate the patient on empiric Zosyn therapy  We will de-escalate antibiotic therapy based on the results  Infection cannot be ruled out especially given patient's increasing somnolence at this time    Status epilepticus (HCC)- (present on admission)  Assessment & Plan  Patient on 4 drug antiepileptic drug therapy including topiramate, Vimpat, Keppra, valproate  Neurology team is following, neurology team to titrate antiepileptic drug therapy as clinically appropriate  Maintain seizure precautions  Continue close clinical monitoring    GBM (glioblastoma multiforme) (HCC)- (present on admission)  Assessment & Plan  s/p Left parietal craniotomy with subtotal resection. 2/26/2020  Followed by [facility] [place]  History of radiation therapy  Previous MRI May 17, 2020 without any acute concerns, will need comparison for new findings compared to prior MRI.  Given persistent somnolence, interval MRI requested 05/27/20  Neurology team is following  Patient is on Decadron, this will need to be tapered  Will need close outpatient neurology, oncology, radiation oncology and neurosurgery follow-up    Acute respiratory failure with hypoxia (HCC)- (present on admission)  Assessment & Plan  Stable now  Patient has intermittent episode of hypoxemia concerning for obstructive sleep apnea, could be potentially central in etiology too  Continue close pulse oximetry and oxygen support as needed    Type 2 diabetes mellitus with hyperglycemia, without long-term current use of insulin (HCC)- (present on admission)  Assessment & Plan  A1C 7.5   Likely steroid induced   Continue basal insulin, Lantus 10 units  Sliding scale insulin No. 2  Will titrate to achieve normal glycemic control  Hypoglycemic protocol in place    Hypokalemia-  (present on admission)  Assessment & Plan  Patient remains on enteral replacement, continue and monitor daily potassium levels    Essential hypertension- (present on admission)  Assessment & Plan  Continue losartan and amlodipine  Titrate to achieve normotensive control       VTE prophylaxis: SC Lovenox

## 2020-05-27 NOTE — THERAPY
"Physical Therapy   Daily Treatment     Patient Name: Ahmet Escobedo  Age:  60 y.o., Sex:  male  Medical Record #: [id number]  Today's Date: 5/27/2020     Precautions: Nasogastric Tube, Fall Risk    Assessment    Pt upon arrival very lethargic during tx session. Extensive verbal stimuli and only received a \"grunt\" out of patient. Performed log rolling multiple times to assist with pericare. Where he required total assist both directions. Attempted to reach seated position with total assist. Pt too lethargic and did not follow any commands today. Will continue to follow while in house. Continue to recommend post acute placement.      05/27/20 0903   Other Treatments   Other Treatments Provided extra time for pericare. PROM for BLE. verbal stimuli    Gait Analysis   Gait Level Of Assist Unable to Participate   Bed Mobility    Supine to Sit Total Assist   Sit to Supine Total Assist   Scooting Total Assist   Rolling Total Assist to Lt.;Total Assist to Rt.   Functional Mobility   Sit to Stand Unable to Participate   Short Term Goals    Short Term Goal # 1 Pt will perform supine <> sit with min assist in 6 visits to get in/out of bed   Goal Outcome # 1 goal not met   Short Term Goal # 2 Pt will perform all functional transfers with min assist in 6 visits to increase independence   Goal Outcome # 2 Goal not met   Short Term Goal # 3 Pt will ambulate 25ft with FWW and min assist in 6 visits to increase independence   Goal Outcome # 3 Goal not met     Plan    Continue current treatment plan.    Discharge recommendations: Recommend post-acute placement for continued physical therapy services prior to discharge home.         "

## 2020-05-27 NOTE — THERAPY
Missed Therapy     Patient Name: Ahmet Escobedo  Age:  60 y.o., Sex:  male  Medical Record #: [id number]  Today's Date: 5/27/2020    Discussed missed therapy with RN. Attempted to see patient for dysphagia tx. Patient unable to fully arouse.        05/27/20 0935   Treatment Variance   Reason For Missed Therapy Medical - Patient Unarousable

## 2020-05-27 NOTE — ASSESSMENT & PLAN NOTE
I discussed with the family 05/27/20  Patient independent relatively with ADLs, IADLs prior to presentation    Intermittent, waxing and waning GCS, remains intermittently persistent    Etiologies considered include: Polypharmacy/pharmacological/iatrogenic, recurrent seizures, infectious (UTI), /others.    Scopolamine patch which can be sedated - stopped 05/27/2020     I have discussed with Dr. Zhao from neurology, he agrees that the AED therapy could be sedated but recommends continuing this given high risk of development of status epilepticus if patient is weaned off this.  He reports patient may develop tolerance to this with time.  05/30/20 discussed again with Dr Zhao from neurology, given worsening ammonia levels and now developing transaminitis my concern is that if valproic acid could be contributing to worsening encephalopathy secondary to hyperammonemia and now leading to transaminitis. Dr Zhao agreed, from his perspective valproic acid not be adding much to the therapy anyways given he is on strong and potent 3 other antiepileptic drugs, valproic acid could be safely stopped at this time.  Patient did receive a dose this morning, will stop valproic acid per neurology recommendations and hopefully repeat an EEG over the course of next 24 to 48 hours.  On May 31, 2020, patient is more somnolent.  Discussed and evaluated the patient with Dr Zhao, from neurology and decision to initiate 24-hour continuous EEG again.  This showed subclinical seizures, none persistent seizures.  Neurology team has added gabapentin therapy now to the regimen on June 1, 2020.    Neurology team is evaluating the patient and following and available as needed    Maintain fluid hydration, avoid development of metabolic issues including electrolyte abnormalities.  Closely monitor sodium levels.  I have requested nephrology team evaluation for consideration of initiation of DDAVP.    TSH within normal limit, B12, folate  normal    Maintain strict aspiration, fall and seizure precautions.    6/4: discussed with [facility], did say that in patients with GBM they can have particularly long post ictal periods, increasing steroids today, some improvement today (modafinil may have helped)

## 2020-05-28 PROBLEM — N30.00 ACUTE CYSTITIS WITHOUT HEMATURIA: Status: ACTIVE | Noted: 2020-05-28

## 2020-05-28 LAB
ALBUMIN SERPL BCP-MCNC: 3.3 G/DL (ref 3.2–4.9)
ALBUMIN/GLOB SERPL: 1.2 G/DL
ALP SERPL-CCNC: 52 U/L (ref 30–99)
ALT SERPL-CCNC: 104 U/L (ref 2–50)
AMMONIA PLAS-SCNC: 58 UMOL/L (ref 11–45)
ANION GAP SERPL CALC-SCNC: 12 MMOL/L (ref 7–16)
AST SERPL-CCNC: 27 U/L (ref 12–45)
BASOPHILS # BLD AUTO: 0.4 % (ref 0–1.8)
BASOPHILS # BLD: 0.06 K/UL (ref 0–0.12)
BILIRUB SERPL-MCNC: 0.4 MG/DL (ref 0.1–1.5)
BUN SERPL-MCNC: 37 MG/DL (ref 8–22)
CALCIUM SERPL-MCNC: 9.3 MG/DL (ref 8.5–10.5)
CHLORIDE SERPL-SCNC: 110 MMOL/L (ref 96–112)
CO2 SERPL-SCNC: 22 MMOL/L (ref 20–33)
CREAT SERPL-MCNC: 0.71 MG/DL (ref 0.5–1.4)
CRP SERPL HS-MCNC: 9.72 MG/DL (ref 0–0.75)
EOSINOPHIL # BLD AUTO: 0.11 K/UL (ref 0–0.51)
EOSINOPHIL NFR BLD: 0.8 % (ref 0–6.9)
ERYTHROCYTE [DISTWIDTH] IN BLOOD BY AUTOMATED COUNT: 53.6 FL (ref 35.9–50)
ERYTHROCYTE [SEDIMENTATION RATE] IN BLOOD BY WESTERGREN METHOD: 66 MM/HOUR (ref 0–20)
FOLATE SERPL-MCNC: 14.3 NG/ML
GLOBULIN SER CALC-MCNC: 2.7 G/DL (ref 1.9–3.5)
GLUCOSE BLD-MCNC: 164 MG/DL (ref 65–99)
GLUCOSE BLD-MCNC: 169 MG/DL (ref 65–99)
GLUCOSE BLD-MCNC: 204 MG/DL (ref 65–99)
GLUCOSE BLD-MCNC: 224 MG/DL (ref 65–99)
GLUCOSE SERPL-MCNC: 170 MG/DL (ref 65–99)
HCT VFR BLD AUTO: 40.2 % (ref 42–52)
HGB BLD-MCNC: 12.7 G/DL (ref 14–18)
IMM GRANULOCYTES # BLD AUTO: 0.13 K/UL (ref 0–0.11)
IMM GRANULOCYTES NFR BLD AUTO: 1 % (ref 0–0.9)
LYMPHOCYTES # BLD AUTO: 0.99 K/UL (ref 1–4.8)
LYMPHOCYTES NFR BLD: 7.3 % (ref 22–41)
MAGNESIUM SERPL-MCNC: 2 MG/DL (ref 1.5–2.5)
MCH RBC QN AUTO: 31.1 PG (ref 27–33)
MCHC RBC AUTO-ENTMCNC: 31.6 G/DL (ref 33.7–35.3)
MCV RBC AUTO: 98.5 FL (ref 81.4–97.8)
MONOCYTES # BLD AUTO: 0.76 K/UL (ref 0–0.85)
MONOCYTES NFR BLD AUTO: 5.6 % (ref 0–13.4)
NEUTROPHILS # BLD AUTO: 11.6 K/UL (ref 1.82–7.42)
NEUTROPHILS NFR BLD: 84.9 % (ref 44–72)
NRBC # BLD AUTO: 0 K/UL
NRBC BLD-RTO: 0 /100 WBC
PHOSPHATE SERPL-MCNC: 2.7 MG/DL (ref 2.5–4.5)
PLATELET # BLD AUTO: 182 K/UL (ref 164–446)
PMV BLD AUTO: 10.8 FL (ref 9–12.9)
POTASSIUM SERPL-SCNC: 3.5 MMOL/L (ref 3.6–5.5)
PROCALCITONIN SERPL-MCNC: 0.31 NG/ML
PROT SERPL-MCNC: 6 G/DL (ref 6–8.2)
RBC # BLD AUTO: 4.08 M/UL (ref 4.7–6.1)
SODIUM SERPL-SCNC: 144 MMOL/L (ref 135–145)
VIT B12 SERPL-MCNC: 1675 PG/ML (ref 211–911)
WBC # BLD AUTO: 13.7 K/UL (ref 4.8–10.8)

## 2020-05-28 PROCEDURE — 86140 C-REACTIVE PROTEIN: CPT

## 2020-05-28 PROCEDURE — 97530 THERAPEUTIC ACTIVITIES: CPT | Mod: CQ

## 2020-05-28 PROCEDURE — 97112 NEUROMUSCULAR REEDUCATION: CPT | Mod: CO

## 2020-05-28 PROCEDURE — A9270 NON-COVERED ITEM OR SERVICE: HCPCS | Performed by: PSYCHIATRY & NEUROLOGY

## 2020-05-28 PROCEDURE — 85652 RBC SED RATE AUTOMATED: CPT

## 2020-05-28 PROCEDURE — 700101 HCHG RX REV CODE 250: Performed by: INTERNAL MEDICINE

## 2020-05-28 PROCEDURE — 770020 HCHG ROOM/CARE - TELE (206)

## 2020-05-28 PROCEDURE — 83735 ASSAY OF MAGNESIUM: CPT

## 2020-05-28 PROCEDURE — 82607 VITAMIN B-12: CPT

## 2020-05-28 PROCEDURE — A9270 NON-COVERED ITEM OR SERVICE: HCPCS | Performed by: INTERNAL MEDICINE

## 2020-05-28 PROCEDURE — 99232 SBSQ HOSP IP/OBS MODERATE 35: CPT | Performed by: PSYCHIATRY & NEUROLOGY

## 2020-05-28 PROCEDURE — 85025 COMPLETE CBC W/AUTO DIFF WBC: CPT

## 2020-05-28 PROCEDURE — 82140 ASSAY OF AMMONIA: CPT

## 2020-05-28 PROCEDURE — 82746 ASSAY OF FOLIC ACID SERUM: CPT

## 2020-05-28 PROCEDURE — 92526 ORAL FUNCTION THERAPY: CPT

## 2020-05-28 PROCEDURE — 99233 SBSQ HOSP IP/OBS HIGH 50: CPT | Performed by: INTERNAL MEDICINE

## 2020-05-28 PROCEDURE — 700111 HCHG RX REV CODE 636 W/ 250 OVERRIDE (IP): Performed by: INTERNAL MEDICINE

## 2020-05-28 PROCEDURE — 700102 HCHG RX REV CODE 250 W/ 637 OVERRIDE(OP): Performed by: INTERNAL MEDICINE

## 2020-05-28 PROCEDURE — 97535 SELF CARE MNGMENT TRAINING: CPT | Mod: CO

## 2020-05-28 PROCEDURE — 36415 COLL VENOUS BLD VENIPUNCTURE: CPT

## 2020-05-28 PROCEDURE — 700102 HCHG RX REV CODE 250 W/ 637 OVERRIDE(OP): Performed by: PSYCHIATRY & NEUROLOGY

## 2020-05-28 PROCEDURE — 700105 HCHG RX REV CODE 258: Performed by: INTERNAL MEDICINE

## 2020-05-28 PROCEDURE — 84145 PROCALCITONIN (PCT): CPT

## 2020-05-28 PROCEDURE — 84100 ASSAY OF PHOSPHORUS: CPT

## 2020-05-28 PROCEDURE — 82962 GLUCOSE BLOOD TEST: CPT

## 2020-05-28 PROCEDURE — 80053 COMPREHEN METABOLIC PANEL: CPT

## 2020-05-28 RX ORDER — LACTULOSE 20 G/30ML
30 SOLUTION ORAL 3 TIMES DAILY
Status: DISCONTINUED | OUTPATIENT
Start: 2020-05-28 | End: 2020-05-29

## 2020-05-28 RX ORDER — LACOSAMIDE 100 MG/1
300 TABLET ORAL ONCE
Status: COMPLETED | OUTPATIENT
Start: 2020-05-28 | End: 2020-05-28

## 2020-05-28 RX ADMIN — LACTULOSE 30 ML: 20 SOLUTION ORAL at 17:57

## 2020-05-28 RX ADMIN — VALPROIC ACID 750 MG: 250 SOLUTION ORAL at 04:39

## 2020-05-28 RX ADMIN — INSULIN LISPRO 3 UNITS: 100 INJECTION, SOLUTION INTRAVENOUS; SUBCUTANEOUS at 17:59

## 2020-05-28 RX ADMIN — LEVETIRACETAM 1500 MG: 500 TABLET ORAL at 04:33

## 2020-05-28 RX ADMIN — LACOSAMIDE 300 MG: 100 TABLET, FILM COATED ORAL at 18:52

## 2020-05-28 RX ADMIN — ENOXAPARIN SODIUM 40 MG: 100 INJECTION SUBCUTANEOUS at 04:33

## 2020-05-28 RX ADMIN — LACOSAMIDE 300 MG: 100 TABLET, FILM COATED ORAL at 04:34

## 2020-05-28 RX ADMIN — VALPROIC ACID 750 MG: 250 SOLUTION ORAL at 17:56

## 2020-05-28 RX ADMIN — SODIUM CHLORIDE, POTASSIUM CHLORIDE, SODIUM LACTATE AND CALCIUM CHLORIDE: 600; 310; 30; 20 INJECTION, SOLUTION INTRAVENOUS at 00:57

## 2020-05-28 RX ADMIN — PIPERACILLIN SODIUM AND TAZOBACTAM SODIUM 3.38 G: 3; .375 INJECTION, POWDER, FOR SOLUTION INTRAVENOUS at 14:22

## 2020-05-28 RX ADMIN — TOPIRAMATE 200 MG: 100 TABLET, FILM COATED ORAL at 17:56

## 2020-05-28 RX ADMIN — INSULIN LISPRO 2 UNITS: 100 INJECTION, SOLUTION INTRAVENOUS; SUBCUTANEOUS at 00:09

## 2020-05-28 RX ADMIN — SODIUM CHLORIDE, POTASSIUM CHLORIDE, SODIUM LACTATE AND CALCIUM CHLORIDE: 600; 310; 30; 20 INJECTION, SOLUTION INTRAVENOUS at 19:52

## 2020-05-28 RX ADMIN — LEVETIRACETAM 1500 MG: 500 TABLET ORAL at 17:55

## 2020-05-28 RX ADMIN — PIPERACILLIN SODIUM AND TAZOBACTAM SODIUM 3.38 G: 3; .375 INJECTION, POWDER, FOR SOLUTION INTRAVENOUS at 04:08

## 2020-05-28 RX ADMIN — PIPERACILLIN SODIUM AND TAZOBACTAM SODIUM 3.38 G: 3; .375 INJECTION, POWDER, FOR SOLUTION INTRAVENOUS at 23:58

## 2020-05-28 RX ADMIN — LOSARTAN POTASSIUM 100 MG: 50 TABLET, FILM COATED ORAL at 17:56

## 2020-05-28 RX ADMIN — DEXAMETHASONE 2 MG: 0.5 ELIXIR ORAL at 07:30

## 2020-05-28 RX ADMIN — TOPIRAMATE 200 MG: 100 TABLET, FILM COATED ORAL at 04:34

## 2020-05-28 RX ADMIN — POTASSIUM BICARBONATE 50 MEQ: 978 TABLET, EFFERVESCENT ORAL at 04:34

## 2020-05-28 RX ADMIN — LACTULOSE 30 ML: 20 SOLUTION ORAL at 14:11

## 2020-05-28 RX ADMIN — INSULIN LISPRO 3 UNITS: 100 INJECTION, SOLUTION INTRAVENOUS; SUBCUTANEOUS at 11:22

## 2020-05-28 RX ADMIN — INSULIN LISPRO 2 UNITS: 100 INJECTION, SOLUTION INTRAVENOUS; SUBCUTANEOUS at 05:51

## 2020-05-28 RX ADMIN — INSULIN GLARGINE 10 UNITS: 100 INJECTION, SOLUTION SUBCUTANEOUS at 18:00

## 2020-05-28 RX ADMIN — AMLODIPINE BESYLATE 5 MG: 5 TABLET ORAL at 04:34

## 2020-05-28 RX ADMIN — SODIUM CHLORIDE, POTASSIUM CHLORIDE, SODIUM LACTATE AND CALCIUM CHLORIDE: 600; 310; 30; 20 INJECTION, SOLUTION INTRAVENOUS at 10:05

## 2020-05-28 ASSESSMENT — COGNITIVE AND FUNCTIONAL STATUS - GENERAL
DRESSING REGULAR LOWER BODY CLOTHING: TOTAL
DAILY ACTIVITIY SCORE: 6
MOVING TO AND FROM BED TO CHAIR: A LOT
DRESSING REGULAR UPPER BODY CLOTHING: TOTAL
MOVING FROM LYING ON BACK TO SITTING ON SIDE OF FLAT BED: A LOT
EATING MEALS: TOTAL
HELP NEEDED FOR BATHING: TOTAL
SUGGESTED CMS G CODE MODIFIER DAILY ACTIVITY: CN
PERSONAL GROOMING: TOTAL
WALKING IN HOSPITAL ROOM: TOTAL
TOILETING: TOTAL
SUGGESTED CMS G CODE MODIFIER MOBILITY: CM
STANDING UP FROM CHAIR USING ARMS: TOTAL
TURNING FROM BACK TO SIDE WHILE IN FLAT BAD: A LOT
MOBILITY SCORE: 9
CLIMB 3 TO 5 STEPS WITH RAILING: TOTAL

## 2020-05-28 ASSESSMENT — GAIT ASSESSMENTS: GAIT LEVEL OF ASSIST: UNABLE TO PARTICIPATE

## 2020-05-28 ASSESSMENT — PAIN SCALES - WONG BAKER: WONGBAKER_NUMERICALRESPONSE: DOESN'T HURT AT ALL

## 2020-05-28 NOTE — PROGRESS NOTES
Monitor summary: SB-SR w/ PACs and PVCs, OH 0.18, QRS 0.08, QT 0.32, HR 60s-80s, per strip from monitor room.

## 2020-05-28 NOTE — DISCHARGE PLANNING
Agency/Facility Name: [facility]   Spoke To: Jass   Outcome: Pt accepted, per Jass we are welcome to update once the d/c is within a few days and the facility will run insurance auth. [facility] received contact information for Jass directly.   Phone: [phone].

## 2020-05-28 NOTE — DISCHARGE PLANNING
Received Choice form at 1030  Agency/Facility Name: 1) [facility], 2) [facility], 3) [facility].   Referral sent per Choice form manually to [facility] and [facility], sent in Epic to [facility].       @1230  Agency/Facility Name: [facility]   Spoke To: Jass   Outcome: Per Jass referral is being reviewed by nursing team now, CCA answered f/u questions regarding insurance and plan for d/c care. Jass offered to be in contact after review.       @1242  Agency/Facility Name: [facility]   Spoke To: John   Outcome: Per John referral is being reviewed,CCA givin new fax number for future reference   [phone]. [facility] asks for a copy of pt insurance card front and back as well. CCA informed LSW JOSE Nino who will attempt to find physical card.

## 2020-05-28 NOTE — CARE PLAN
Problem: Communication  Goal: The ability to communicate needs accurately and effectively will improve  Outcome: PROGRESSING AS EXPECTED  Note:   Pt. Is A&O x 1, disoriented to place, time, & situation. Pt. Is unable to follow commands. Will continue to reorient, round hourly and encourage the Pt. To ask questions and voice feelings.            Problem: Safety  Goal: Will remain free from injury  Outcome: PROGRESSING AS EXPECTED  Note: Pt is a max assist. Q2 turns in place. Room has adequate lighting, is free of clutter, call light is within reach, and bed is locked and in the lowest position. Will continue to hourly round.

## 2020-05-28 NOTE — PROGRESS NOTES
Placed Zoom call via unit IPad with bedside RN, Patricia, all three daughters, significant other and myself. Answered all questions for family. Provided updates from Dr Pool in regards to UTI treatment, elevated Ammonia and treatment and staying the course with Keppra dose with it's side effects of lethargy as per [facility] orders. They were all very pleased with ability to see and speak to patient. I told them I would be happy to continue to Zoom daily calls at the same time, 1400 through our work week ending, 6/1/2020.

## 2020-05-28 NOTE — DIETARY
"Nutrition support weekly update:  Day 14 of admit.  Ahmet Escobedo is a 60 y.o. male with admitting DX of seizure.  Tube feeding started on 5/16. Current TF via gastric cortak is Peptamen Intense VHP @ 60 mL/hr, providing 1440 kcals, 132 g protein, and 1210 mL free water.    Assessment:  Weight: 90.6 kg via Bed Scale, wt fluctuating, is currently 3 kg down from admit weight, currently -9L per I/Os if accurate  Re-estimate of nutritional needs as indicated as pt no longer in ICU    Estimated Nutritional Needs based on:   Height: 167.6 cm (5' 5.98\")  Weight: 90.6 kg (199 lb 11.8 oz)  Weight to Use in Calculations: 90.6 kg (199 lb 11.8 oz)  Ideal Body Weight: 64.5 kg (142 lb 3.2 oz)  Body mass index is 33.32 kg/m².   Pertinent medical history: glioblastoma multiforme, HTN, sz    Calculation/Equation: MSJ x 1.0 = 1655 kcal  Total Calories / day: 1359- 1630  (Calories / kg ABW: 15 - 18)  Total Grams Protein / day: 90 - 129  (Grams Protein / kg IBW: 1.4 - 2.0)    Evaluation:   1. SLP continuing to follow, laith remains appropriate at this time.   2. Palliative care consultation requested per MD  3. Labs: K 3.5, BUN 37, , ammonia 55, A1c 7.5%; POC glucose: 224, 169, 164, 208, 210, 239  4. Meds: decadron, lovenox, Lantus-10 units, SSI, miralax, kylte, lactated ringers infusion  5. Last BM: 5/28- smear/brown/ soft   6. Pt could likely benefit from glucose controlled formula with fiber and Omega-3 fatty acids to meet estimated needs and assist in glycemic control.     Malnutrition risk: Pt noted with moderate to severe fluid accumulation, no other criteria noted at this time.    Recommendations/Plan:  1. Change TF to Diabetisource AC to goal rate of 60 mL/hr, which provides 1728 kcal (19 kcal/kg ABW), 87 g protein (1.3g/kg IBW), 144 g CHO, and 1181 mL free water.   2. Fluids per MD    RD following               "

## 2020-05-28 NOTE — PROGRESS NOTES
"NEUROLOGY PROGRESS NOTE      BACKGROUND:    60 y.o. male was admitted on 5/13/2020 10:34 PM for Seizure (HCC) [R56.9].       SUBJECTIVE:   No seizure activity reported.  He is drowsy but arousable, he told me his name and was able to follow commands and squeeze my finger with both hands.  His EEG yesterday did not reveal active seizure.  His  repeat MRI is a stable and unchanged.    VITALS:  Vitals:    05/28/20 0000 05/28/20 0400 05/28/20 0800 05/28/20 0900   BP: 125/89 138/91 135/89    Pulse: 92 88 90    Resp: 18 16 17    Temp: 37.3 °C (99.2 °F) 37 °C (98.6 °F) 36.6 °C (97.9 °F)    TempSrc: Temporal Temporal Temporal    SpO2: 97% 96% 96%    Weight:       Height:    1.676 m (5' 5.98\")       NEUROLOGICAL EXAM:   He is drowsy and able to follow commands.  He does not communicate much verbally but was able to tell me his name.  Pupils are equal and reactive.  Extraocular movement intact.  I do not appreciate facial asymmetry.  Facial sensation is intact.  He moves his left upper and lower extremity with good strength and has weakness of right upper and lower extremity.  Sensation appears to be intact.  Coordination cannot be tested.  Deep tendon reflexes are 1+ and equal.    OBJECTIVE:    NEUROIMAGING:    MR-BRAIN-WITH & W/O   Final Result      1.  Postsurgical changes left parietal craniotomy again noted. No significant change in thick walled rim enhancing left parietal mass/resection cavity and adjacent separate nodular focus of enhancement. Surrounding vasogenic edema and/or nonenhancing    tumor is also unchanged with stable mass effect.   2.  No acute infarct or new area of hemorrhage.   3.  No significant interval change.      DX-ABDOMEN FOR TUBE PLACEMENT   Final Result      Feeding tube tip overlies the upper gastric body.      DX-CHEST-LIMITED (1 VIEW)   Final Result      1.  Feeding tube tip located high in position at the level of the gastroesophageal junction. Advancement is recommended.      2.  " Cardiomegaly.      This was discussed with the patient's ICU nurse at 8:22 AM on 5/27/2020.      DX-ABDOMEN FOR TUBE PLACEMENT   Final Result      Cortrak nasogastric tube position consistent with intragastric placement.      DX-CHEST-PORTABLE (1 VIEW)   Final Result      1.  No focal pulmonary consolidation.      2.  Extubation      DX-ABDOMEN FOR TUBE PLACEMENT   Final Result      Enteric tube tip projects over the stomach.      DX-CHEST-PORTABLE (1 VIEW)   Final Result      No significant change from prior exam.      DX-ABDOMEN FOR TUBE PLACEMENT   Final Result      Enteric tube tip projects over the distal stomach.      DX-CHEST-PORTABLE (1 VIEW)   Final Result      No significant change from prior exam.      DX-ABDOMEN FOR TUBE PLACEMENT   Final Result      Enteric tube terminates over the stomach.      DX-CHEST-PORTABLE (1 VIEW)   Final Result      Increasing left basilar opacity could be from atelectasis or consolidation      DX-CHEST-PORTABLE (1 VIEW)   Final Result      Stable chest except findings with bibasilar atelectasis.      DX-CHEST-PORTABLE (1 VIEW)   Final Result      Stable chest x-ray findings.      DX-CHEST-PORTABLE (1 VIEW)   Final Result      Stable hypoventilatory chest with stable hazy left basilar opacity could be from atelectasis or consolidation favored over pleural fluid      DX-CHEST-PORTABLE (1 VIEW)   Final Result         1. No significant interval change.      US-DUPLEX TESTICLE COMPLETE (COMBO)   Final Result      Large bilateral hydroceles, right more than left.      Unremarkable bilateral testes.      DX-CHEST-PORTABLE (1 VIEW)   Final Result         1. No significant interval change.      MR-BRAIN-WITH & W/O   Final Result      1.  No acute hemorrhage or ischemia   2.  Two areas of enhancement in the LEFT parietal tumor resection bed which could be posttherapeutic change or indicative of residual or recurrent glioblastoma. Direct comparison with prior imaging would be helpful to  further assess.   3.  3 mm of LEFT-to-RIGHT midline shift   4.  Atrophy   5.  White matter changes      DX-ABDOMEN FOR TUBE PLACEMENT   Final Result      Cortrak feeding tube tip projects in the region of the distal stomach.      DX-CHEST-LIMITED (1 VIEW)   Final Result      1.  New right subclavian vein central line tip projects in satisfactory position. No pneumothorax.      2.  Satisfactory position of endotracheal tube.      3.  Feeding tube is now present.      4.  No focal pulmonary consolidation.      DX-CHEST-PORTABLE (1 VIEW)   Final Result      1.  Interval placement of an endotracheal tube which terminates in satisfactory position at the level of the aortic arch.   2.  Mild right basilar atelectasis and/or consolidation.      DX-CHEST-PORTABLE (1 VIEW)   Final Result      No radiographic evidence of acute cardiopulmonary process.      CT-CTA NECK WITH & W/O-POST PROCESSING   Final Result      CT angiogram of the neck within normal limits.      CT-CTA HEAD WITH & W/O-POST PROCESS   Final Result      Left parietal mass resection with some blood vessels along the  operative bed margins. These could indicate healing response although local recurrence is also possible. Comparison with prior studies and correlation with operative history may prove    helpful to clarify      No acute arterial occlusion is identified      CT-HEAD W/O   Final Result      No acute intracranial hemorrhage is identified.      Left parietal vertex craniotomy with underlying vasogenic edema and mass with associated mild rightward midline shift, moderate left lateral ventricular effacement          MEDICATIONS:  Current Facility-Administered Medications   Medication Dose Route Frequency Provider Last Rate Last Dose   • lactated ringers infusion   Intravenous Continuous Liliana Pool M.D. 125 mL/hr at 05/28/20 1005     • piperacillin-tazobactam (ZOSYN) 3.375 g in  mL IVPB  3.375 g Intravenous Q8HRS Liliana Pool M.D. 25 mL/hr at  05/28/20 0735     • insulin glargine (LANTUS) injection 10 Units  10 Units Subcutaneous Q EVENING Liliana Pool M.D.   10 Units at 05/27/20 1725    And   • insulin lispro (HUMALOG) injection 2-9 Units  2-9 Units Subcutaneous Q6HRS Liliana Pool M.D.   2 Units at 05/28/20 0551    And   • glucose 4 g chewable tablet 16 g  16 g Enteral Tube Q15 MIN PRN Liliana Pool M.D.        And   • dextrose 50% (D50W) injection 50 mL  50 mL Intravenous Q15 MIN PRN Liliana Pool M.D.       • polyethylene glycol/lytes (MIRALAX) PACKET 1 Packet  1 Packet Enteral Tube BID Liliana Pool M.D.   Stopped at 05/27/20 1800   • dexamethasone (DECADRON) 0.5 MG/5ML elixir 2 mg  2 mg Enteral Tube DAILY Ahmet Lopez M.D.   2 mg at 05/28/20 0730   • potassium bicarbonate (KLYTE) effervescent tablet 50 mEq  50 mEq Enteral Tube DAILY Nik Jones M.D.   50 mEq at 05/28/20 0434   • lacosamide (VIMPAT) tablet 300 mg  300 mg Enteral Tube BID Nik Jones M.D.   300 mg at 05/28/20 0434   • levETIRAcetam (KEPPRA) tablet 1,500 mg  1,500 mg Enteral Tube BID Nik Jones M.D.   1,500 mg at 05/28/20 0433   • Valproate Sodium (DEPAKENE) oral solution 750 mg  750 mg Enteral Tube BID Nik Jones M.D.   750 mg at 05/28/20 0439   • amLODIPine (NORVASC) tablet 5 mg  5 mg Enteral Tube Q DAY Nik Jones M.D.   5 mg at 05/28/20 0434   • enoxaparin (LOVENOX) inj 40 mg  40 mg Subcutaneous DAILY Colton Perez M.D.   40 mg at 05/28/20 0433   • Pharmacy Consult: Enteral tube insertion - review meds/change route/product selection  1 Each Other PHARMACY TO DOSE Colton Perez M.D.       • losartan (COZAAR) tablet 100 mg  100 mg Enteral Tube DAILY Colton Perez M.D.   100 mg at 05/27/20 1718   • topiramate (TOPAMAX) tablet 200 mg  200 mg Enteral Tube Q12HRS Colton Perez M.D.   200 mg at 05/28/20 0434   • acetaminophen (TYLENOL) tablet 650 mg  650 mg Enteral Tube Q6HRS PRN Colton Perez M.D.   650 mg at 05/20/20 1937   • ondansetron (ZOFRAN ODT)  dispertab 4 mg  4 mg Enteral Tube Q4HRS PRN Colton Perez M.D.       • Respiratory Therapy Consult   Nebulization Continuous RT Nikhil Erickson D.O.       • ipratropium-albuterol (DUONEB) nebulizer solution  3 mL Nebulization Q2HRS PRN (RT) Nikhil Erickson D.O.       • ondansetron (ZOFRAN) syringe/vial injection 4 mg  4 mg Intravenous Q4HRS PRN Igor Carlos M.D.       • LORazepam (ATIVAN) injection 4 mg  4 mg Intravenous Q10 MIN PRN Igor Carlos M.D.   4 mg at 05/15/20 1545   • hydrALAZINE (APRESOLINE) injection 10 mg  10 mg Intravenous Q6HRS PRN Guido Pierre M.D.   10 mg at 05/22/20 0208       LABS:      Recent Labs     05/26/20  0302 05/27/20  0428 05/28/20  0314   WBC 20.2* 21.9* 13.7*   RBC 4.49* 4.40* 4.08*   HEMOGLOBIN 14.0 13.6* 12.7*   HEMATOCRIT 43.8 42.8 40.2*   MCV 97.6 97.3 98.5*   MCH 31.2 30.9 31.1   MCHC 32.0* 31.8* 31.6*   RDW 53.9* 53.8* 53.6*   PLATELETCT 195 193 182   MPV 10.8 10.9 10.8     Recent Labs     05/26/20  0302 05/27/20  0431 05/28/20  0314   SODIUM 150* 145 144   POTASSIUM 3.5* 3.4* 3.5*   CHLORIDE 118* 111 110   CO2 19* 19* 22   GLUCOSE 219* 248* 170*   BUN 51* 45* 37*     INR   Date Value Ref Range Status   05/13/2020 0.87 0.87 - 1.13 Final     Comment:     INR - Non-therapeutic Reference Range: 0.87-1.13  INR - Therapeutic Reference Range: 2.0-4.0       No results found for: POCINR  Lab Results   Component Value Date/Time    CREATININE 0.81 05/25/2020 0015     Lab Results   Component Value Date/Time    IFAFRICA >60 05/25/2020 0015    IFNOTAFR >60 05/25/2020 0015     EEG (5/22/2020:  This is an abnormal video EEG recording in a sedated patient. There is slowing in the left hemisphere. There is slowing in the left hemisphere. Continuous left posterior quadrant spikes/polyspikes and sharps noted with frequent but brief runs of left lateralized periodic discharges. No clear seizures captured.  The patient remains at high risk for seizure recurrence. The findings suggest a large  underlying area of cortical irritability and structural abnormality. Clinical and radiological correlation is recommended.    EEG (5/27/20):  This is an abnormal routine EEG recording in the awake, drowsy, and sleep states. There is slowing in the left hemisphere. Frequent left posterior quadrant sharps noted with brief runs of left lateralized periodic discharges. No  seizures captured.  The patient remains at high risk for seizure recurrence. The findings suggest a large underlying area of cortical irritability and structural abnormality. Clinical and radiological correlation is recommended.    ASSESSMENT AND PLAN:  60-year-old male with history of glioblastoma multiform and status post surgical resection who was admitted on 5/15/20 for evaluation of status epilepticus.  No more seizure activity reported.  He will continue with Keppra 1500 mg twice a day, Vimpat 300 mg twice a day, valproic acid 750 mg twice a day and Topamax 200 mg twice a day.  We will continue with Decadron for his vasogenic edema.  Repeat brain MRI yesterday revealed:  1.  Postsurgical changes left parietal craniotomy again noted. No significant change in thick walled rim enhancing left parietal mass/resection cavity and adjacent separate nodular focus of enhancement. Surrounding vasogenic edema and/or nonenhancing   tumor is also unchanged with stable mass effect.  2.  No acute infarct or new area of hemorrhage.  3.  No significant interval change.  Repeat EEG yesterday as outlined above revealed no seizure activity.  Clinically there is no significant changes over past several days on my examination.  Neurology will follow as needed, please call me if there is any question.

## 2020-05-28 NOTE — THERAPY
Occupational Therapy  Daily Treatment     Patient Name: Ahmet Escobedo  Age:  60 y.o., Sex:  male  Medical Record #: [id number]  Today's Date: 5/28/2020     Precautions  Precautions: Fall Risk, Nasogastric Tube, Swallow Precautions ( See Comments)  Comments: R side deficits     Assessment    Pt seen for OT tx. Continues to have impaired attention, poor command following, impaired sequencing and generalized weakness. RUE flaccid w/ no active or purposeful movement and is subluxed. Intermittently following commands during session. Pt however, would appropriately smile and laugh at therapist. Continue to recommend placement prior to d/c home.     Plan    Continue current treatment plan.    Discharge recommendations:  Recommend post-acute placement for additional occupational therapy services prior to discharge home.         05/28/20 1320   Cognition    Cognition / Consciousness X   Speech/ Communication Dysarthric;Incomprehensible Words   Ability To Follow Commands 1 Step   Attention Impaired   Sequencing Impaired   Initiation Impaired   Active ROM Upper Body   Active ROM Upper Body  X   Comments RUE flaccid and subluxed    Strength Upper Body   Upper Body Strength  X   Comments no active or purposeful movement RUMED, LUE WFL    Upper Body Muscle Tone   Upper Body Muscle Tone  X   Rt Upper Extremity Muscle Tone Hypotonic   Comments flaccidity w/ subluxation    Bed Mobility    Supine to Sit Maximal Assist   Sit to Supine Maximal Assist   Scooting Maximal Assist   Activities of Daily Living   Grooming Maximal Assist;Seated   Bathing Maximal Assist   Upper Body Dressing Maximal Assist   Lower Body Dressing Maximal Assist   Toileting Maximal Assist   Short Term Goals   Short Term Goal # 1 pt will follow 1 step commands 100% of the time   Goal Outcome # 1 Progressing slower than expected   Short Term Goal # 2 pt will participate in oral care w/min A    Goal Outcome # 2 Progressing slower than expected   Short Term Goal # 3 pt  will sit EOB in midline w/min A for at least 10 min    Goal Outcome # 3 Progressing slower than expected

## 2020-05-28 NOTE — PROGRESS NOTES
Hospital Medicine Daily Progress Note    Date of Service  5/28/2020    Chief Complaint  60 y.o. male admitted 5/13/2020 with seizure    Hospital Course  60 y.o. male admitted 5/13/2020 with a history of GBM and seizures.  Hospitalization complicated by status epilepticus, requiring ICU stay.    Interval Problem Update  Patient seen and evaluated on rounds  Discussed with nursing staff on rounds    Today patient is opening eyes to verbal stimuli, still having inappropriate words, able to repeat his own name and following commands such as ability to squeeze my fingers with his hand.  GCS upon calculation today is 12.  Significantly improved compared to yesterday.    No other issues/complaints per nursing staff    At bedside, I personally called Daughter Linh, established a video call through my personal phone via face time so the daughter could connect with her father.    I discussed the case with hospitalist JERO Guerrero, she will establish a conference call over resume video conferencing with the patient and family including all daughters and significant other.  Family informed regarding this.    I also personally later in the day called the daughter Linh - updated her regarding entire medical plan of care, discussed input from neurology, all questions and concerns were consistently answered by me.    Patient has testicular swelling, increased to the right side.  Noted finding of hydrocele.    On valproate,?  Hyperammonemia.  Noted, start lactulose.    AED therapy, titration per neurology team.  Recommendations to continue current regimen, no de-escalation.    Acute cystitis, continue Zosyn, follow cultures.    Continue gentle fluid hydration.    Linh [phone] to be updated for any emergent concerns.  They would appreciate daily updates.      Palliative care consultation requested by me, pending    Consultants/Specialty  Neurology  Pulmonology/critical care  Palliative care    Code Status  Full  code    Disposition  Will need aggressive PT, OT, SLP evaluations determine disposition planning once somnolence has improved.    Review of Systems  Review of Systems   Unable to perform ROS: Acuity of condition        Physical Exam  Temp:  [36.5 °C (97.7 °F)-37.3 °C (99.2 °F)] 36.5 °C (97.7 °F)  Pulse:  [83-92] 83  Resp:  [16-18] 17  BP: (116-138)/(79-91) 116/79  SpO2:  [90 %-97 %] 90 %    Physical Exam  Constitutional:       Appearance: He is obese. He is diaphoretic. He is not ill-appearing or toxic-appearing.      Comments: Slightly diaphoretic, somnolent  Body mass index is 32.25 kg/m².   HENT:      Head: Normocephalic.      Nose: Nose normal.      Comments: Ng tube in place     Mouth/Throat:      Mouth: Mucous membranes are moist.   Eyes:      Conjunctiva/sclera: Conjunctivae normal.   Neck:      Musculoskeletal: Normal range of motion.   Cardiovascular:      Rate and Rhythm: Normal rate.      Pulses: Normal pulses.      Heart sounds: Normal heart sounds. No murmur.   Pulmonary:      Effort: Pulmonary effort is normal.      Breath sounds: Normal breath sounds. No rhonchi or rales.      Comments: Diminished in bases  Chest:      Chest wall: No tenderness.   Abdominal:      General: Abdomen is flat. Bowel sounds are normal. There is no distension.      Palpations: Abdomen is soft.      Tenderness: There is no abdominal tenderness. There is no right CVA tenderness, left CVA tenderness, guarding or rebound.   Genitourinary:     Comments: Patient has testicular swelling, increased to the right side  Musculoskeletal:         General: No swelling or deformity.      Right lower leg: No edema.      Left lower leg: No edema.   Skin:     General: Skin is warm.   Neurological:      Comments: Somnolent, GCS is 12.  Pupil responsive.   Psychiatric:      Comments: Somnolent.         Fluids    Intake/Output Summary (Last 24 hours) at 5/28/2020 1635  Last data filed at 5/28/2020 1531  Gross per 24 hour   Intake 2200 ml    Output 1400 ml   Net 800 ml       Laboratory  Recent Labs     05/26/20  0302 05/27/20  0428 05/28/20  0314   WBC 20.2* 21.9* 13.7*   RBC 4.49* 4.40* 4.08*   HEMOGLOBIN 14.0 13.6* 12.7*   HEMATOCRIT 43.8 42.8 40.2*   MCV 97.6 97.3 98.5*   MCH 31.2 30.9 31.1   MCHC 32.0* 31.8* 31.6*   RDW 53.9* 53.8* 53.6*   PLATELETCT 195 193 182   MPV 10.8 10.9 10.8     Recent Labs     05/26/20  0302 05/27/20  0431 05/28/20  0314   SODIUM 150* 145 144   POTASSIUM 3.5* 3.4* 3.5*   CHLORIDE 118* 111 110   CO2 19* 19* 22   GLUCOSE 219* 248* 170*   BUN 51* 45* 37*   CREATININE 0.78 0.75 0.71   CALCIUM 9.7 9.6 9.3                   Imaging       Assessment/Plan  * Encephalopathy acute- (present on admission)  Assessment & Plan  I discussed with the family 05/27/20  Patient independent relatively with ADLs, IADLs prior to presentation  Now increasingly somnolent  GCS today 05/28/20 is 12    Etiologies considered include: Polypharmacy/pharmacological/iatrogenic, infectious (UTI), /others.    At this time I have stopped the scopolamine patch which can be sedated 05/27/2020 with improvement 05/28/20    I have discussed with Dr. Zhao from neurology, he agrees that the AED therapy could be sedated but recommends continuing this given high risk of development of status epilepticus if patient is weaned off this.  He reports patient may develop tolerance to this with time.      Hyperammonemia noted, not significant but could be related to the use of valproic acid, will initiate lactulose therapy and repeat ammonia level tomorrow.      Continue management of UTI with IV Zosyn, de-escalate based on culture results.    Neurology team is evaluating the patient    Maintain fluid hydration, avoid development of metabolic issues including electrolyte abnormalities.  Closely monitor sodium levels.    TSH within normal limit, B12, folate normal    Maintain strict aspiration, fall and seizure precautions.    Acute cystitis without  hematuria  Assessment & Plan  Continue IV Zosyn, de-escalate based on culture results    Status epilepticus (HCC)- (present on admission)  Assessment & Plan  Patient on 4 drug antiepileptic drug therapy including topiramate, Vimpat, Keppra, valproate  Neurology team is following, neurology team to titrate antiepileptic drug therapy as clinically appropriate  Maintain seizure precautions  Continue close clinical monitoring    GBM (glioblastoma multiforme) (HCC)- (present on admission)  Assessment & Plan  s/p Left parietal craniotomy with subtotal resection. 2/26/2020  Followed by [facility]  History of radiation therapy  Previous MRI May 17, 2020 without any acute concerns, will need comparison for new findings compared to prior MRI.  Given persistent somnolence, interval MRI requested 05/27/20  Neurology team is following  Patient is on Decadron, this will need to be tapered  Will need close outpatient neurology, oncology, radiation oncology and neurosurgery follow-up    Acute respiratory failure with hypoxia (HCC)- (present on admission)  Assessment & Plan  Stable now  Patient has intermittent episode of hypoxemia concerning for obstructive sleep apnea, could be potentially central in etiology too  Continue close pulse oximetry and oxygen support as needed    Type 2 diabetes mellitus with hyperglycemia, without long-term current use of insulin (HCC)- (present on admission)  Assessment & Plan  A1C 7.5   Likely steroid induced   Continue basal insulin, Lantus 10 units  Sliding scale insulin No. 2  Will titrate to achieve normal glycemic control  Hypoglycemic protocol in place    Bandemia- (present on admission)  Assessment & Plan  Likely secondary to steroids, contributory from underlying acute cystitis  Continue IV Zosyn, de-escalate as clinically appropriate    Hypokalemia- (present on admission)  Assessment & Plan  Patient remains on enteral replacement, continue and monitor daily potassium levels    Essential  hypertension- (present on admission)  Assessment & Plan  Continue losartan and amlodipine  Titrate to achieve normotensive control       VTE prophylaxis: SC Lovenox

## 2020-05-28 NOTE — THERAPY
"Speech Language Pathology  Daily Treatment     Patient Name: Ahmet Escobedo  Age:  60 y.o., Sex:  male  Medical Record #: [id number]  Today's Date: 5/28/2020       Precautions: Nasogastric Tube, Fall Risk, Swallow Precautions ( See Comments)  Comments: ALISON fraire.       Objective       05/28/20 1515   Dysphagia    Dysphagia X   Positioning / Behavior Modification Self Monitoring;Modulate Rate or Bite Size;Multiple Swallows   Oral / Pharyngeal / Laryngeal Exercises Laryngeal Exercises   Other Treatments PO trials ice chips, mildly thick liquid.    Diet / Liquid Recommendation NPO;Pre-Feeding Trials with SLP Only   Nutritional Liquid Intake Rating Scale Nothing by mouth  (5 single ice chips/hr after oral care if alert)   Nutritional Food Intake Rating Scale Nothing by mouth   Nursing Communication Swallow Precaution Sign Posted at Head of Bed  (NPO/ice chip sign)   Skilled Intervention Compensatory Strategies;Verbal Cueing   Recommended Route of Medication Administration   Medication Administration  Via Gastric Tube   Patient / Family Goals   Patient / Family Goal #1 When asked how many ice chips pt has had, he stated, \"Not enough!\"    Short Term Goals   Short Term Goal # 1 Pt will consume prefeeding trials with SLP only with no overt S/Sx of aspiration noted.    Goal Outcome # 1 Progressing as expected   Short Term Goal # 2 Pt will be able to follow single step oral motor exercises with max cues and >50% accuracy.   Goal Outcome # 2  Progressing as expected       Assessment    Pt sleeping soundly upon SLP's entry, but awakened after repositioning and verbal stimuli. He required occasional verbal stimuli throughout tx to maintain adequate level of alertness. AOX1 (year: \"1920\"), can be verbally perseverative, occasional meaningless verbalizations/vocalizations, pleasant and motivated for PO intake. Oral care completed. PO trials ice chips and mildly thick liquids administered. He often bit down on spoon despite verbal " "cues, but began prompt mastication or bolus manipulation once bolus in oral cavity. Strong laryngeal elevation to palpation, no clinical s/o aspiration with single ice chips. Wet voicing in 1/5 trials mildly thick liquids via spoon, which is concerning for aspiration. Pt with weak cued cough. He was able to imitate a laryngeal elevation exercise (falsetto) given clinician model with poor-fair accuracy. He began singing, \"I'm in drama!\" during exercise task and could not be redirected.     Plan    Continue NPO/Cortrak. RN may provide 5 single ice chips/hour after oral care only if pt fully alert.    Discharge recommendations:  Recommend inpatient transitional care services for continued speech therapy services.    "

## 2020-05-28 NOTE — ASSESSMENT & PLAN NOTE
ESBL E. coli   Treated with zosyn x 2 days, meropenem IV x 6 days, can dc today 6/3/20  Novak still in place

## 2020-05-28 NOTE — PROGRESS NOTES
Spoke with daughterLinh at [phone] regarding family Zoom call with her sisters and her fathers significant other at 1400 today. Linh is gathering the emails from family for this to occur.   Notified bedside RNPatricia and Charge RNIshan as well.

## 2020-05-28 NOTE — PROGRESS NOTES
Monitor summary: SR-ST , WV 0.16, QRS 0.08, QT 0.32, with rare PACs, occasional bigeminal PACs, & rare PVCs, per strip from monitor room.

## 2020-05-28 NOTE — THERAPY
"Physical Therapy   Daily Treatment     Patient Name: Ahmet Escobedo  Age:  60 y.o., Sex:  male  Medical Record #: [id number]  Today's Date: 5/28/2020     Precautions: Nasogastric Tube, Fall Risk, Swallow Precautions ( See Comments)    Subjective    \"SHHH\"       Assessment    Pt was more alert today once at EOB. He kept his eyes open and engaged in conversation today. Pt was nonsensical and difficulty following any commands at this time. He required maxA to reach EOB. Once at EOB he was unable to hold self up at EOB requiring maxA. Performed PROM to BLE, he was unable to active RLE but did actively kick LLE. Unable to complete transfers due to safety and balance. Pt continues to be limited by weakness, cognition and balance all impacting his mobility. Recommend post acute placement at [facility].     Plan    Continue current treatment plan.    Discharge recommendations:  Recommend post-acute placement for continued physical therapy services prior to discharge home.            05/28/20 1440   Gait Analysis   Gait Level Of Assist Unable to Participate   Bed Mobility    Supine to Sit Maximal Assist   Sit to Supine Maximal Assist   Scooting Maximal Assist   Rolling Maximum Assist to Lt.;Maximal Assist to Rt.   Functional Mobility   Sit to Stand Unable to Participate   Short Term Goals    Short Term Goal # 1 Pt will perform supine <> sit with min assist in 6 visits to get in/out of bed   Goal Outcome # 1 goal not met   Short Term Goal # 2 Pt will perform all functional transfers with min assist in 6 visits to increase independence   Goal Outcome # 2 Goal not met   Short Term Goal # 3 Pt will ambulate 25ft with FWW and min assist in 6 visits to increase independence   Goal Outcome # 3 Goal not met     "

## 2020-05-29 LAB
ALBUMIN SERPL BCP-MCNC: 3.3 G/DL (ref 3.2–4.9)
ALBUMIN/GLOB SERPL: 1.4 G/DL
ALP SERPL-CCNC: 48 U/L (ref 30–99)
ALT SERPL-CCNC: 79 U/L (ref 2–50)
AMMONIA PLAS-SCNC: 52 UMOL/L (ref 11–45)
ANION GAP SERPL CALC-SCNC: 13 MMOL/L (ref 7–16)
AST SERPL-CCNC: 19 U/L (ref 12–45)
BASOPHILS # BLD AUTO: 0.6 % (ref 0–1.8)
BASOPHILS # BLD: 0.05 K/UL (ref 0–0.12)
BILIRUB SERPL-MCNC: 0.2 MG/DL (ref 0.1–1.5)
BUN SERPL-MCNC: 32 MG/DL (ref 8–22)
CALCIUM SERPL-MCNC: 9.1 MG/DL (ref 8.5–10.5)
CHLORIDE SERPL-SCNC: 114 MMOL/L (ref 96–112)
CO2 SERPL-SCNC: 21 MMOL/L (ref 20–33)
CREAT SERPL-MCNC: 0.72 MG/DL (ref 0.5–1.4)
EOSINOPHIL # BLD AUTO: 0.11 K/UL (ref 0–0.51)
EOSINOPHIL NFR BLD: 1.3 % (ref 0–6.9)
ERYTHROCYTE [DISTWIDTH] IN BLOOD BY AUTOMATED COUNT: 51.4 FL (ref 35.9–50)
GLOBULIN SER CALC-MCNC: 2.4 G/DL (ref 1.9–3.5)
GLUCOSE BLD-MCNC: 152 MG/DL (ref 65–99)
GLUCOSE BLD-MCNC: 174 MG/DL (ref 65–99)
GLUCOSE BLD-MCNC: 184 MG/DL (ref 65–99)
GLUCOSE BLD-MCNC: 196 MG/DL (ref 65–99)
GLUCOSE SERPL-MCNC: 172 MG/DL (ref 65–99)
HCT VFR BLD AUTO: 37.8 % (ref 42–52)
HGB BLD-MCNC: 12.1 G/DL (ref 14–18)
IMM GRANULOCYTES # BLD AUTO: 0.09 K/UL (ref 0–0.11)
IMM GRANULOCYTES NFR BLD AUTO: 1.1 % (ref 0–0.9)
LYMPHOCYTES # BLD AUTO: 0.83 K/UL (ref 1–4.8)
LYMPHOCYTES NFR BLD: 10.1 % (ref 22–41)
MCH RBC QN AUTO: 31.1 PG (ref 27–33)
MCHC RBC AUTO-ENTMCNC: 32 G/DL (ref 33.7–35.3)
MCV RBC AUTO: 97.2 FL (ref 81.4–97.8)
MONOCYTES # BLD AUTO: 0.52 K/UL (ref 0–0.85)
MONOCYTES NFR BLD AUTO: 6.4 % (ref 0–13.4)
NEUTROPHILS # BLD AUTO: 6.58 K/UL (ref 1.82–7.42)
NEUTROPHILS NFR BLD: 80.5 % (ref 44–72)
NRBC # BLD AUTO: 0 K/UL
NRBC BLD-RTO: 0 /100 WBC
PLATELET # BLD AUTO: 179 K/UL (ref 164–446)
PMV BLD AUTO: 10.9 FL (ref 9–12.9)
POTASSIUM SERPL-SCNC: 3.4 MMOL/L (ref 3.6–5.5)
PROT SERPL-MCNC: 5.7 G/DL (ref 6–8.2)
RBC # BLD AUTO: 3.89 M/UL (ref 4.7–6.1)
SODIUM SERPL-SCNC: 148 MMOL/L (ref 135–145)
WBC # BLD AUTO: 8.2 K/UL (ref 4.8–10.8)

## 2020-05-29 PROCEDURE — 700101 HCHG RX REV CODE 250: Performed by: INTERNAL MEDICINE

## 2020-05-29 PROCEDURE — 770020 HCHG ROOM/CARE - TELE (206)

## 2020-05-29 PROCEDURE — 82140 ASSAY OF AMMONIA: CPT

## 2020-05-29 PROCEDURE — A9270 NON-COVERED ITEM OR SERVICE: HCPCS | Performed by: INTERNAL MEDICINE

## 2020-05-29 PROCEDURE — 700111 HCHG RX REV CODE 636 W/ 250 OVERRIDE (IP): Performed by: INTERNAL MEDICINE

## 2020-05-29 PROCEDURE — 700102 HCHG RX REV CODE 250 W/ 637 OVERRIDE(OP): Performed by: INTERNAL MEDICINE

## 2020-05-29 PROCEDURE — 85025 COMPLETE CBC W/AUTO DIFF WBC: CPT

## 2020-05-29 PROCEDURE — A9270 NON-COVERED ITEM OR SERVICE: HCPCS | Performed by: PSYCHIATRY & NEUROLOGY

## 2020-05-29 PROCEDURE — 99233 SBSQ HOSP IP/OBS HIGH 50: CPT | Performed by: INTERNAL MEDICINE

## 2020-05-29 PROCEDURE — 82962 GLUCOSE BLOOD TEST: CPT | Mod: 91

## 2020-05-29 PROCEDURE — 700102 HCHG RX REV CODE 250 W/ 637 OVERRIDE(OP): Performed by: PSYCHIATRY & NEUROLOGY

## 2020-05-29 PROCEDURE — 700105 HCHG RX REV CODE 258: Performed by: INTERNAL MEDICINE

## 2020-05-29 PROCEDURE — 36415 COLL VENOUS BLD VENIPUNCTURE: CPT

## 2020-05-29 PROCEDURE — 80053 COMPREHEN METABOLIC PANEL: CPT

## 2020-05-29 RX ORDER — LACTULOSE 20 G/30ML
30 SOLUTION ORAL 2 TIMES DAILY
Status: DISCONTINUED | OUTPATIENT
Start: 2020-05-29 | End: 2020-05-30

## 2020-05-29 RX ADMIN — LACTULOSE 30 ML: 20 SOLUTION ORAL at 04:06

## 2020-05-29 RX ADMIN — DEXAMETHASONE 2 MG: 0.5 ELIXIR ORAL at 04:06

## 2020-05-29 RX ADMIN — AMLODIPINE BESYLATE 5 MG: 5 TABLET ORAL at 04:07

## 2020-05-29 RX ADMIN — VALPROIC ACID 750 MG: 250 SOLUTION ORAL at 04:06

## 2020-05-29 RX ADMIN — MEROPENEM 500 MG: 500 INJECTION, POWDER, FOR SOLUTION INTRAVENOUS at 11:12

## 2020-05-29 RX ADMIN — INSULIN LISPRO 2 UNITS: 100 INJECTION, SOLUTION INTRAVENOUS; SUBCUTANEOUS at 00:07

## 2020-05-29 RX ADMIN — ENOXAPARIN SODIUM 40 MG: 100 INJECTION SUBCUTANEOUS at 04:06

## 2020-05-29 RX ADMIN — LEVETIRACETAM 1500 MG: 500 TABLET ORAL at 17:33

## 2020-05-29 RX ADMIN — INSULIN GLARGINE 10 UNITS: 100 INJECTION, SOLUTION SUBCUTANEOUS at 17:55

## 2020-05-29 RX ADMIN — LACOSAMIDE 300 MG: 100 TABLET, FILM COATED ORAL at 17:33

## 2020-05-29 RX ADMIN — POTASSIUM BICARBONATE 50 MEQ: 978 TABLET, EFFERVESCENT ORAL at 04:08

## 2020-05-29 RX ADMIN — LACTULOSE 30 ML: 20 SOLUTION ORAL at 17:35

## 2020-05-29 RX ADMIN — INSULIN LISPRO 2 UNITS: 100 INJECTION, SOLUTION INTRAVENOUS; SUBCUTANEOUS at 12:13

## 2020-05-29 RX ADMIN — MEROPENEM 500 MG: 500 INJECTION, POWDER, FOR SOLUTION INTRAVENOUS at 17:35

## 2020-05-29 RX ADMIN — VALPROIC ACID 750 MG: 250 SOLUTION ORAL at 19:57

## 2020-05-29 RX ADMIN — SODIUM CHLORIDE, POTASSIUM CHLORIDE, SODIUM LACTATE AND CALCIUM CHLORIDE: 600; 310; 30; 20 INJECTION, SOLUTION INTRAVENOUS at 04:04

## 2020-05-29 RX ADMIN — SODIUM CHLORIDE, POTASSIUM CHLORIDE, SODIUM LACTATE AND CALCIUM CHLORIDE: 600; 310; 30; 20 INJECTION, SOLUTION INTRAVENOUS at 13:10

## 2020-05-29 RX ADMIN — LEVETIRACETAM 1500 MG: 500 TABLET ORAL at 04:07

## 2020-05-29 RX ADMIN — LOSARTAN POTASSIUM 100 MG: 50 TABLET, FILM COATED ORAL at 17:35

## 2020-05-29 RX ADMIN — TOPIRAMATE 200 MG: 100 TABLET, FILM COATED ORAL at 17:34

## 2020-05-29 RX ADMIN — LACTULOSE 30 ML: 20 SOLUTION ORAL at 11:12

## 2020-05-29 RX ADMIN — INSULIN LISPRO 2 UNITS: 100 INJECTION, SOLUTION INTRAVENOUS; SUBCUTANEOUS at 05:44

## 2020-05-29 RX ADMIN — TOPIRAMATE 200 MG: 100 TABLET, FILM COATED ORAL at 04:07

## 2020-05-29 RX ADMIN — INSULIN LISPRO 2 UNITS: 100 INJECTION, SOLUTION INTRAVENOUS; SUBCUTANEOUS at 17:54

## 2020-05-29 RX ADMIN — LACOSAMIDE 300 MG: 100 TABLET, FILM COATED ORAL at 04:07

## 2020-05-29 RX ADMIN — PIPERACILLIN SODIUM AND TAZOBACTAM SODIUM 3.38 G: 3; .375 INJECTION, POWDER, FOR SOLUTION INTRAVENOUS at 08:32

## 2020-05-29 NOTE — PROGRESS NOTES
"Placed Zoom call with patients three daughters and SO, Jerrica. Patient was very tired initially, but woke up after face stimulation with cool washcloth, wiped eyes and swabbed mouth with wet sponge. He was able to state his , his favorite quote from a movie plus the movie title as well as state, \"Thirteen and Sound of Music are my favorites.\" Jerrica had to leave the zoom early for a meeting. Jagruti from [facility] was present towards the end of call and we spoke to the family privately about his wishes and code status. One of the daughters remembers distinctively discussing this with him prior to his 2020 GBM surgery and thinks he may have some paperwork at his house. She will look further into any paperwork. Answered all family questions and agreed to Zoom again tomorrow at 1400.      "

## 2020-05-29 NOTE — PROGRESS NOTES
Hospital Medicine Daily Progress Note    Date of Service  5/29/2020    Chief Complaint  60 y.o. male admitted 5/13/2020 with seizure    Hospital Course  60 y.o. male admitted 5/13/2020 with a history of GBM and seizures.  Hospitalization complicated by status epilepticus, requiring ICU stay.    Interval Problem Update  Patient seen and evaluated on rounds  Discussed with nursing staff on rounds    Spontaneously opening eyes today, confused, obeys commands.    No other issues/complaints per nursing staff, having frequent bowel movements.  Lactulose twice daily, check ammonia, will recheck in the morning tomorrow.    ESBL E. coli, started meropenem.    Plan of care discussed over the phone personally by me with daughter Linh, updated regarding plan of care, underlying medical concerns including treatment of ESBL E. coli, disposition planning, all questions and concerns answered.    I discussed the case with hospitalist JERO Guerrero, daily video conferencing with family to continue    Patient has testicular swelling, increased to the right side.  Noted finding of hydrocele.    On valproate,?  Hyperammonemia.  Noted, start lactulose.  Recheck tomorrow.    AED therapy, titration per neurology team.  Recommendations to continue current regimen, no de-escalation.    Acute cystitis, continue intravenous meropenem.    Continue gentle fluid hydration.    Linh [phone] to be updated for any emergent concerns.  They would appreciate daily updates.      Palliative care on board    Consultants/Specialty  Neurology  Pulmonology/critical care  Palliative care    Code Status  Full code    Disposition  Will need aggressive PT, OT, SLP evaluations determine disposition planning once somnolence has improved.    Review of Systems  Review of Systems   Unable to perform ROS: Acuity of condition        Physical Exam  Temp:  [36.6 °C (97.9 °F)-37.3 °C (99.2 °F)] 36.6 °C (97.9 °F)  Pulse:  [76-91] 81  Resp:  [16-19] 19  BP:  (127-159)/() 159/102  SpO2:  [96 %-98 %] 97 %    Physical Exam  Constitutional:       Appearance: He is obese. He is diaphoretic. He is not ill-appearing or toxic-appearing.      Comments: Slightly diaphoretic, somnolent  Body mass index is 32.25 kg/m².   HENT:      Head: Normocephalic.      Nose: Nose normal.      Comments: Ng tube in place     Mouth/Throat:      Mouth: Mucous membranes are moist.   Eyes:      Conjunctiva/sclera: Conjunctivae normal.   Neck:      Musculoskeletal: Normal range of motion.   Cardiovascular:      Rate and Rhythm: Normal rate.      Pulses: Normal pulses.      Heart sounds: Normal heart sounds. No murmur.   Pulmonary:      Effort: Pulmonary effort is normal.      Breath sounds: Normal breath sounds. No rhonchi or rales.      Comments: Diminished in bases  Chest:      Chest wall: No tenderness.   Abdominal:      General: Abdomen is flat. Bowel sounds are normal. There is no distension.      Palpations: Abdomen is soft.      Tenderness: There is no abdominal tenderness. There is no right CVA tenderness, left CVA tenderness, guarding or rebound.   Genitourinary:     Comments: Patient has testicular swelling, increased to the right side  Musculoskeletal:         General: No swelling or deformity.      Right lower leg: No edema.      Left lower leg: No edema.   Skin:     General: Skin is warm.   Neurological:      Comments: Somnolent, GCS is 12.  Pupil responsive.   Psychiatric:      Comments: Somnolent.         Fluids    Intake/Output Summary (Last 24 hours) at 5/29/2020 1611  Last data filed at 5/29/2020 0330  Gross per 24 hour   Intake 1200 ml   Output 900 ml   Net 300 ml       Laboratory  Recent Labs     05/27/20  0428 05/28/20  0314 05/29/20  0325   WBC 21.9* 13.7* 8.2   RBC 4.40* 4.08* 3.89*   HEMOGLOBIN 13.6* 12.7* 12.1*   HEMATOCRIT 42.8 40.2* 37.8*   MCV 97.3 98.5* 97.2   MCH 30.9 31.1 31.1   MCHC 31.8* 31.6* 32.0*   RDW 53.8* 53.6* 51.4*   PLATELETCT 193 182 179   MPV 10.9  10.8 10.9     Recent Labs     05/27/20  0431 05/28/20  0314 05/29/20  0325   SODIUM 145 144 148*   POTASSIUM 3.4* 3.5* 3.4*   CHLORIDE 111 110 114*   CO2 19* 22 21   GLUCOSE 248* 170* 172*   BUN 45* 37* 32*   CREATININE 0.75 0.71 0.72   CALCIUM 9.6 9.3 9.1                   Imaging       Assessment/Plan  * Encephalopathy acute- (present on admission)  Assessment & Plan  I discussed with the family 05/27/20  Patient independent relatively with ADLs, IADLs prior to presentation    Now increasingly somnolent  GCS today 05/29/20 is 14    Etiologies considered include: Polypharmacy/pharmacological/iatrogenic, infectious (UTI), /others.    At this time I have stopped the scopolamine patch which can be sedated 05/27/2020 with improvement 05/28/20    I have discussed with Dr. Zhao from neurology, he agrees that the AED therapy could be sedated but recommends continuing this given high risk of development of status epilepticus if patient is weaned off this.  He reports patient may develop tolerance to this with time.      Hyperammonemia noted, not significant but could be related to the use of valproic acid, will initiate lactulose therapy and repeat ammonia level tomorrow.      Continue management of UTI with IV Zosyn, de-escalate based on culture results.    Neurology team is evaluating the patient    Maintain fluid hydration, avoid development of metabolic issues including electrolyte abnormalities.  Closely monitor sodium levels.    TSH within normal limit, B12, folate normal    Maintain strict aspiration, fall and seizure precautions.    Acute cystitis without hematuria  Assessment & Plan  ESBL E. coli   Continue meropenem IV    Status epilepticus (HCC)- (present on admission)  Assessment & Plan  Patient on 4 drug antiepileptic drug therapy including topiramate, Vimpat, Keppra, valproate  Neurology team is following, neurology team to titrate antiepileptic drug therapy as clinically appropriate  Maintain seizure  precautions  Continue close clinical monitoring    GBM (glioblastoma multiforme) (HCC)- (present on admission)  Assessment & Plan  s/p Left parietal craniotomy with subtotal resection. 2/26/2020  Followed by [facility]  History of radiation therapy  Previous MRI May 17, 2020 without any acute concerns, will need comparison for new findings compared to prior MRI.  Given persistent somnolence, interval MRI requested 05/27/20  Neurology team is following  Patient is on Decadron, this will need to be tapered  Will need close outpatient neurology, oncology, radiation oncology and neurosurgery follow-up    Acute respiratory failure with hypoxia (HCC)- (present on admission)  Assessment & Plan  Stable now  Patient has intermittent episode of hypoxemia concerning for obstructive sleep apnea, could be potentially central in etiology too  Continue close pulse oximetry and oxygen support as needed    Type 2 diabetes mellitus with hyperglycemia, without long-term current use of insulin (HCC)- (present on admission)  Assessment & Plan  A1C 7.5   Likely steroid induced   Continue basal insulin, Lantus 10 units  Sliding scale insulin No. 2  Will titrate to achieve normal glycemic control  Hypoglycemic protocol in place    Bandemia- (present on admission)  Assessment & Plan  Likely secondary to steroids, contributory from underlying acute cystitis  IV meropenem    Hypokalemia- (present on admission)  Assessment & Plan  Patient remains on enteral replacement, continue and monitor daily potassium levels    Essential hypertension- (present on admission)  Assessment & Plan  Continue losartan and amlodipine  Titrate to achieve normotensive control       VTE prophylaxis: SC Lovenox

## 2020-05-29 NOTE — CARE PLAN
Problem: Safety  Goal: Will remain free from falls  Outcome: PROGRESSING AS EXPECTED   Call light and belongings within reach. Bed locked in lowest position. Bed alarm on. Fall precautions in place.  Problem: Skin Integrity  Goal: Risk for impaired skin integrity will decrease  Outcome: PROGRESSING AS EXPECTED   Q2H turns in place. Waffle mattress present. Mepilex to sacrum.

## 2020-05-29 NOTE — PROGRESS NOTES
Monitor summary: SR 77-86, NH 0.16, QRS 0.10, QT 0.38, with rare PVC, occasional PACs, & occasional & frequent Bigeminal PACs, per strip from monitor room.

## 2020-05-29 NOTE — CONSULTS
"Reason for PC Consult: Advance Care Planning    Consulted by: Dr Pool    Assessment:  General: 59 yo male admitted on 5/13/2020 for Seizure. PMH: Glioblastoma multiforme, HTN.     Pt presented to the ED via [facility] to the ED for right sided weakness and slurred speech after a seizure. Pt has hx of Glioblastoma resection in Feb 2020 and has completed chemo and radiation approx May 11.     Dyspnea: No- resp rate 19, 97% on room air.   Last BM: 05/29/20-    Pain: Unable to determine-    Depression: Unable to determine-    Dementia: Unable to Determine;       Spiritual:  Is Gnosticist or spirituality important for coping with this illness? Yes-    Has a  or spiritual provider visit been requested? No    Palliative Performance Scale: 40%    Advance Directive: None on file.   DPOA: No, NOK Linh Partida [phone], daughter.    POLST: None on file.       Code Status: Full      Outcome:  Received consult, pt currently remains oriented to self only.   Called and spoke with pts daughter, Linh, introduced self and the role of Palliative Care. Spoke about her fathers cancer treatments, spoke about previous conversations had with Dr Pool. Discussed families hope to get her father back to [place] soon.     Asked Linh if she had ever discussed end of life thoughts with her father. Linh stated that they had discussed these types of things prior to his surgery this year in February. Spoke about the benefit for her and the rest of family to know his wishes. Linh spoke about his thoughts on not wanting to be \"kept alive or on life support if he couldn't recover\".     Asked Linh if they had ever discussed Code status. They had not. Explained Code status, Full code, CPR, and intubation, vs DNR and DNI. Linh was thoughtful and felt that she would like the rest of the family to hear this from this PC RN rather than her. Agreed to have a conference call this afternoon with the rest of the family, after the family Zoom to see " and speak with their father.     Spoke further about his life and the support of family. Linh verbalized how important she felt it was to include her fathers girlfriend, Jerrica, and she was very happy about how helpful and inclusive the meetings and care have been.     Agreed to speak further this afternoon at 14:45 via conference call.     Updated: Dr Pool    Plan: Conference call with family at 14:45 to discuss Code status further. Plan of care continues to be stabilization with goal of moving to SNF in [facility] to be closer to family.     Thank you for allowing Palliative Care to participate in this patient's care. Please feel free to call [phone] with any questions or concerns.

## 2020-05-29 NOTE — PALLIATIVE CARE
"Palliative Care follow-up  Arrived to pts room while Zoom face time was in progress with pt, family and Hospitalist JERO Guerrero. Once family had completed their visit moved with JERO Guerrero to family room while keeping pts daughters on Zoom chat.   Sat and spoke with Brittany Melton, and Laurent, pts girlfriend, Jerrica, had already signed off, and Hospitalist JERO Guerrero. Spoke with family about their father and the difficulties navigating quality time during the COVID restrictions. Family is very appreciative for all of the assistance, from bedside RNs, Hospitalist RN and Dr Pool.   Spoke with Fernie on conversation had with Linh earlier in the day. Explained code status, CPR, Intubation, and what DNR and DNI mean. Discussed that this is a decision that need not happen now, that this was a conversation to begin. Spoke about the difficulties with these types of decisions while far away from each other.  Pts daughters spoke about a conversation they all had with their father over hospitals, before he had his surgery in Feb, 2020, on end life wishes. They know that he would not want to be kept on \"life support' if there was not chance of recovery. We spoke about outcomes from CPR, we spoke about quality of life, we spoke about the hardship of removing someone from the vent.   They spoke about how difficult this is as their father apparently recovered quickly from surgery, and had minimal side effects during his chemo and radiation treatment, creating a false sense of security.   We spoke about what a wonderful father and overall human being that the pt is.     Ras' daughters were very grateful for all of the information and the constant updates in order for them to feel connected as a family. At this time, they wish for their father to remain as a Full Code, but they voiced the recognition that there will be a time for everything.   Validated their thoughts and feelings. Provided family with Palliative Team contact " number for any needs.    Updated: Dr Pool    Plan: DC to SNF in [facility] once pt is stable.     Thank you for allowing Palliative Care to support this patient and family. Contact [phone] for additional assistance, change in patient status, or with any questions/concerns.

## 2020-05-29 NOTE — PROGRESS NOTES
Monitor summary: SR 78-86, NH 0.14, QRS 0.08, QT 0.32 with rare PVC, occasional PAC/couplet/bigeminy per strip from monitor room.

## 2020-05-30 LAB
ALBUMIN SERPL BCP-MCNC: 3.4 G/DL (ref 3.2–4.9)
ALBUMIN/GLOB SERPL: 1.4 G/DL
ALP SERPL-CCNC: 46 U/L (ref 30–99)
ALT SERPL-CCNC: 83 U/L (ref 2–50)
AMMONIA PLAS-SCNC: 68 UMOL/L (ref 11–45)
ANION GAP SERPL CALC-SCNC: 12 MMOL/L (ref 7–16)
AST SERPL-CCNC: 28 U/L (ref 12–45)
BASOPHILS # BLD AUTO: 0.8 % (ref 0–1.8)
BASOPHILS # BLD: 0.05 K/UL (ref 0–0.12)
BILIRUB SERPL-MCNC: 0.2 MG/DL (ref 0.1–1.5)
BUN SERPL-MCNC: 27 MG/DL (ref 8–22)
CALCIUM SERPL-MCNC: 8.9 MG/DL (ref 8.5–10.5)
CHLORIDE SERPL-SCNC: 117 MMOL/L (ref 96–112)
CO2 SERPL-SCNC: 21 MMOL/L (ref 20–33)
CREAT SERPL-MCNC: 0.71 MG/DL (ref 0.5–1.4)
EOSINOPHIL # BLD AUTO: 0.11 K/UL (ref 0–0.51)
EOSINOPHIL NFR BLD: 1.7 % (ref 0–6.9)
ERYTHROCYTE [DISTWIDTH] IN BLOOD BY AUTOMATED COUNT: 51.7 FL (ref 35.9–50)
GLOBULIN SER CALC-MCNC: 2.4 G/DL (ref 1.9–3.5)
GLUCOSE BLD-MCNC: 148 MG/DL (ref 65–99)
GLUCOSE BLD-MCNC: 153 MG/DL (ref 65–99)
GLUCOSE BLD-MCNC: 164 MG/DL (ref 65–99)
GLUCOSE BLD-MCNC: 207 MG/DL (ref 65–99)
GLUCOSE SERPL-MCNC: 158 MG/DL (ref 65–99)
HCT VFR BLD AUTO: 39.7 % (ref 42–52)
HGB BLD-MCNC: 12.8 G/DL (ref 14–18)
IMM GRANULOCYTES # BLD AUTO: 0.13 K/UL (ref 0–0.11)
IMM GRANULOCYTES NFR BLD AUTO: 2 % (ref 0–0.9)
LYMPHOCYTES # BLD AUTO: 0.8 K/UL (ref 1–4.8)
LYMPHOCYTES NFR BLD: 12.2 % (ref 22–41)
MAGNESIUM SERPL-MCNC: 2 MG/DL (ref 1.5–2.5)
MCH RBC QN AUTO: 31.5 PG (ref 27–33)
MCHC RBC AUTO-ENTMCNC: 32.2 G/DL (ref 33.7–35.3)
MCV RBC AUTO: 97.8 FL (ref 81.4–97.8)
MONOCYTES # BLD AUTO: 0.42 K/UL (ref 0–0.85)
MONOCYTES NFR BLD AUTO: 6.4 % (ref 0–13.4)
NEUTROPHILS # BLD AUTO: 5.07 K/UL (ref 1.82–7.42)
NEUTROPHILS NFR BLD: 76.9 % (ref 44–72)
NRBC # BLD AUTO: 0 K/UL
NRBC BLD-RTO: 0 /100 WBC
PHOSPHATE SERPL-MCNC: 2.7 MG/DL (ref 2.5–4.5)
PLATELET # BLD AUTO: 183 K/UL (ref 164–446)
PMV BLD AUTO: 10.8 FL (ref 9–12.9)
POTASSIUM SERPL-SCNC: 3.7 MMOL/L (ref 3.6–5.5)
PROT SERPL-MCNC: 5.8 G/DL (ref 6–8.2)
RBC # BLD AUTO: 4.06 M/UL (ref 4.7–6.1)
SODIUM SERPL-SCNC: 150 MMOL/L (ref 135–145)
WBC # BLD AUTO: 6.6 K/UL (ref 4.8–10.8)

## 2020-05-30 PROCEDURE — 700102 HCHG RX REV CODE 250 W/ 637 OVERRIDE(OP): Performed by: INTERNAL MEDICINE

## 2020-05-30 PROCEDURE — 700101 HCHG RX REV CODE 250: Performed by: INTERNAL MEDICINE

## 2020-05-30 PROCEDURE — 700111 HCHG RX REV CODE 636 W/ 250 OVERRIDE (IP): Performed by: FAMILY MEDICINE

## 2020-05-30 PROCEDURE — 700111 HCHG RX REV CODE 636 W/ 250 OVERRIDE (IP): Performed by: INTERNAL MEDICINE

## 2020-05-30 PROCEDURE — 85025 COMPLETE CBC W/AUTO DIFF WBC: CPT

## 2020-05-30 PROCEDURE — 36415 COLL VENOUS BLD VENIPUNCTURE: CPT

## 2020-05-30 PROCEDURE — A9270 NON-COVERED ITEM OR SERVICE: HCPCS | Performed by: INTERNAL MEDICINE

## 2020-05-30 PROCEDURE — 700105 HCHG RX REV CODE 258: Performed by: INTERNAL MEDICINE

## 2020-05-30 PROCEDURE — 770020 HCHG ROOM/CARE - TELE (206)

## 2020-05-30 PROCEDURE — A9270 NON-COVERED ITEM OR SERVICE: HCPCS | Performed by: PSYCHIATRY & NEUROLOGY

## 2020-05-30 PROCEDURE — 82962 GLUCOSE BLOOD TEST: CPT

## 2020-05-30 PROCEDURE — 80053 COMPREHEN METABOLIC PANEL: CPT

## 2020-05-30 PROCEDURE — 99233 SBSQ HOSP IP/OBS HIGH 50: CPT | Performed by: INTERNAL MEDICINE

## 2020-05-30 PROCEDURE — 84100 ASSAY OF PHOSPHORUS: CPT

## 2020-05-30 PROCEDURE — 82140 ASSAY OF AMMONIA: CPT

## 2020-05-30 PROCEDURE — 83735 ASSAY OF MAGNESIUM: CPT

## 2020-05-30 PROCEDURE — 700102 HCHG RX REV CODE 250 W/ 637 OVERRIDE(OP): Performed by: PSYCHIATRY & NEUROLOGY

## 2020-05-30 RX ORDER — SODIUM CHLORIDE 450 MG/100ML
INJECTION, SOLUTION INTRAVENOUS CONTINUOUS
Status: DISCONTINUED | OUTPATIENT
Start: 2020-05-30 | End: 2020-06-01

## 2020-05-30 RX ORDER — DEXTROSE MONOHYDRATE 25 G/50ML
50 INJECTION, SOLUTION INTRAVENOUS
Status: DISCONTINUED | OUTPATIENT
Start: 2020-05-30 | End: 2020-06-01

## 2020-05-30 RX ORDER — SODIUM CHLORIDE, SODIUM LACTATE, POTASSIUM CHLORIDE, AND CALCIUM CHLORIDE .6; .31; .03; .02 G/100ML; G/100ML; G/100ML; G/100ML
1000 INJECTION, SOLUTION INTRAVENOUS ONCE
Status: COMPLETED | OUTPATIENT
Start: 2020-05-30 | End: 2020-05-30

## 2020-05-30 RX ORDER — INSULIN GLARGINE 100 [IU]/ML
10 INJECTION, SOLUTION SUBCUTANEOUS 2 TIMES DAILY
Status: DISCONTINUED | OUTPATIENT
Start: 2020-05-30 | End: 2020-06-01

## 2020-05-30 RX ADMIN — INSULIN LISPRO 2 UNITS: 100 INJECTION, SOLUTION INTRAVENOUS; SUBCUTANEOUS at 04:45

## 2020-05-30 RX ADMIN — MEROPENEM 500 MG: 500 INJECTION, POWDER, FOR SOLUTION INTRAVENOUS at 00:13

## 2020-05-30 RX ADMIN — MEROPENEM 500 MG: 500 INJECTION, POWDER, FOR SOLUTION INTRAVENOUS at 04:28

## 2020-05-30 RX ADMIN — LACOSAMIDE 300 MG: 100 TABLET, FILM COATED ORAL at 04:27

## 2020-05-30 RX ADMIN — AMLODIPINE BESYLATE 5 MG: 5 TABLET ORAL at 04:27

## 2020-05-30 RX ADMIN — SODIUM CHLORIDE, POTASSIUM CHLORIDE, SODIUM LACTATE AND CALCIUM CHLORIDE 1000 ML: 600; 310; 30; 20 INJECTION, SOLUTION INTRAVENOUS at 14:46

## 2020-05-30 RX ADMIN — LEVETIRACETAM 1500 MG: 500 TABLET ORAL at 17:05

## 2020-05-30 RX ADMIN — TOPIRAMATE 200 MG: 100 TABLET, FILM COATED ORAL at 17:05

## 2020-05-30 RX ADMIN — ENOXAPARIN SODIUM 40 MG: 100 INJECTION SUBCUTANEOUS at 04:27

## 2020-05-30 RX ADMIN — INSULIN LISPRO 3 UNITS: 100 INJECTION, SOLUTION INTRAVENOUS; SUBCUTANEOUS at 00:17

## 2020-05-30 RX ADMIN — LACTULOSE 30 ML: 20 SOLUTION ORAL at 04:27

## 2020-05-30 RX ADMIN — INSULIN LISPRO 2 UNITS: 100 INJECTION, SOLUTION INTRAVENOUS; SUBCUTANEOUS at 11:34

## 2020-05-30 RX ADMIN — LEVETIRACETAM 1500 MG: 500 TABLET ORAL at 04:27

## 2020-05-30 RX ADMIN — HYDRALAZINE HYDROCHLORIDE 10 MG: 20 INJECTION INTRAMUSCULAR; INTRAVENOUS at 23:19

## 2020-05-30 RX ADMIN — MEROPENEM 500 MG: 500 INJECTION, POWDER, FOR SOLUTION INTRAVENOUS at 11:34

## 2020-05-30 RX ADMIN — INSULIN GLARGINE 10 UNITS: 100 INJECTION, SOLUTION SUBCUTANEOUS at 17:06

## 2020-05-30 RX ADMIN — SODIUM CHLORIDE: 4.5 INJECTION, SOLUTION INTRAVENOUS at 20:01

## 2020-05-30 RX ADMIN — DEXAMETHASONE 2 MG: 0.5 ELIXIR ORAL at 04:28

## 2020-05-30 RX ADMIN — MEROPENEM 500 MG: 500 INJECTION, POWDER, FOR SOLUTION INTRAVENOUS at 17:04

## 2020-05-30 RX ADMIN — POTASSIUM BICARBONATE 50 MEQ: 978 TABLET, EFFERVESCENT ORAL at 04:28

## 2020-05-30 RX ADMIN — VALPROIC ACID 750 MG: 250 SOLUTION ORAL at 04:29

## 2020-05-30 RX ADMIN — LOSARTAN POTASSIUM 100 MG: 50 TABLET, FILM COATED ORAL at 17:28

## 2020-05-30 RX ADMIN — SODIUM CHLORIDE: 4.5 INJECTION, SOLUTION INTRAVENOUS at 08:45

## 2020-05-30 RX ADMIN — TOPIRAMATE 200 MG: 100 TABLET, FILM COATED ORAL at 04:27

## 2020-05-30 RX ADMIN — LACOSAMIDE 300 MG: 100 TABLET, FILM COATED ORAL at 17:04

## 2020-05-30 ASSESSMENT — FIBROSIS 4 INDEX: FIB4 SCORE: 1.02

## 2020-05-30 NOTE — PROGRESS NOTES
"Spoke with daughters and SO, Jerrica via Zoom. Pt was more lethargic today. Pt did tell his grand kids \"I Love You\" after they were talking on the call. Answered all family questions. Updated them on his condition and they were very appreciative of his facial shave. Discussed our scheduled Zoom call for tomorrow at 1400 for another update and to see him. Bolus IVF running. Changed linen and cleaned patient. Provided oral care and washed face, this also helps to wake him up. Family is very appreciative of our service and ability to see their loved one.  "

## 2020-05-30 NOTE — PROGRESS NOTES
Monitor summary: SR 81-91, WA 0.20, QRS 0.10, QT 0.38, with occasional PVCs, frequent/occasional PACS, and bund branch beats per strip from monitor room.

## 2020-05-30 NOTE — PROGRESS NOTES
Monitor summary: SR 77-89, MI 0.18, QRS 0.10, QT 0.38, with rare PACs & rare PVCs, per strip from monitor room.

## 2020-05-30 NOTE — PROGRESS NOTES
Hospital Medicine Daily Progress Note    Date of Service  5/30/2020    Chief Complaint  60 y.o. male admitted 5/13/2020 with seizure    Hospital Course  60 y.o. male admitted 5/13/2020 with a history of GBM and seizures.  Hospitalization complicated by status epilepticus, requiring ICU stay.    Interval Problem Update  Patient seen and evaluated on rounds  Discussed with nursing staff on rounds    Upon evaluation today, patient is somnolent, opening eyes to verbal stimuli, confused with inappropriate words, obeys commands.    Increased amount of diarrhea secondary to the use of lactulose, given stopping valproic acid will stop lactulose therapy at this time, discussed with nursing.    Hyponatremia, 1 L LR bolus, transition to half NS.  Interval sodium levels in the morning tomorrow.    ESBL E. coli, started meropenem.    Plan of care discussed over the phone personally by me with daughter Linh, over conference call with other daughters including Brittany, Tanvi and significant other Jerrica. Updated regarding plan of care, underlying medical concerns including treatment of ESBL E. coli, disposition planning, all questions and concerns answered personally by me..    I discussed the case with hospitalist JERO Guerrero, daily video conferencing with family to continue on a daily basis.    Patient has testicular swelling, increased to the right side.  Noted finding of hydrocele.    We will recheck ammonia levels tomorrow    Valproic acid stopped today per neurology recommendation    Acute cystitis, continue intravenous meropenem.    Continue gentle fluid hydration.    Linh [phone] to be updated for any emergent concerns.  They would appreciate daily updates.      Palliative care on board    Consultants/Specialty  Neurology  Pulmonology/critical care  Palliative care    Code Status  Full code    Disposition  Will need aggressive PT, OT, SLP evaluations determine disposition planning once somnolence has improved.    Review  of Systems  Review of Systems   Unable to perform ROS: Acuity of condition        Physical Exam  Temp:  [36.2 °C (97.1 °F)-36.9 °C (98.5 °F)] 36.4 °C (97.6 °F)  Pulse:  [60-92] 92  Resp:  [16-20] 16  BP: (135-157)/(84-99) 137/84  SpO2:  [95 %-98 %] 98 %    Physical Exam  Constitutional:       Appearance: He is obese. He is diaphoretic. He is not ill-appearing or toxic-appearing.      Comments: Slightly diaphoretic, somnolent  Body mass index is 32.25 kg/m².   HENT:      Head: Normocephalic.      Nose: Nose normal.      Comments: Ng tube in place     Mouth/Throat:      Mouth: Mucous membranes are moist.   Eyes:      Conjunctiva/sclera: Conjunctivae normal.   Neck:      Musculoskeletal: Normal range of motion.   Cardiovascular:      Rate and Rhythm: Normal rate.      Pulses: Normal pulses.      Heart sounds: Normal heart sounds. No murmur.   Pulmonary:      Effort: Pulmonary effort is normal.      Breath sounds: Normal breath sounds. No rhonchi or rales.      Comments: Diminished in bases  Chest:      Chest wall: No tenderness.   Abdominal:      General: Abdomen is flat. Bowel sounds are normal. There is no distension.      Palpations: Abdomen is soft.      Tenderness: There is no abdominal tenderness. There is no right CVA tenderness, left CVA tenderness, guarding or rebound.   Genitourinary:     Comments: Patient has testicular swelling, increased to the right side  Musculoskeletal:         General: No swelling or deformity.      Right lower leg: No edema.      Left lower leg: No edema.   Skin:     General: Skin is warm.   Neurological:      Comments: Somnolent, GCS is 13-14.  Pupil responsive.   Psychiatric:      Comments: Somnolent.         Fluids    Intake/Output Summary (Last 24 hours) at 5/30/2020 1421  Last data filed at 5/30/2020 1200  Gross per 24 hour   Intake 1500 ml   Output 2900 ml   Net -1400 ml       Laboratory  Recent Labs     05/28/20  0314 05/29/20  0325 05/30/20  0314   WBC 13.7* 8.2 6.6   RBC  4.08* 3.89* 4.06*   HEMOGLOBIN 12.7* 12.1* 12.8*   HEMATOCRIT 40.2* 37.8* 39.7*   MCV 98.5* 97.2 97.8   MCH 31.1 31.1 31.5   MCHC 31.6* 32.0* 32.2*   RDW 53.6* 51.4* 51.7*   PLATELETCT 182 179 183   MPV 10.8 10.9 10.8     Recent Labs     05/28/20  0314 05/29/20  0325 05/30/20  0314   SODIUM 144 148* 150*   POTASSIUM 3.5* 3.4* 3.7   CHLORIDE 110 114* 117*   CO2 22 21 21   GLUCOSE 170* 172* 158*   BUN 37* 32* 27*   CREATININE 0.71 0.72 0.71   CALCIUM 9.3 9.1 8.9                   Imaging       Assessment/Plan  * Encephalopathy acute- (present on admission)  Assessment & Plan  I discussed with the family 05/27/20  Patient independent relatively with ADLs, IADLs prior to presentation    Now increasingly somnolent  GCS today 05/30/20 is 13-14    Etiologies considered include: Polypharmacy/pharmacological/iatrogenic, infectious (UTI), /others.    At this time I have stopped the scopolamine patch which can be sedated 05/27/2020 with improvement 05/28/20    I have discussed with Dr. Zhao from neurology, he agrees that the AED therapy could be sedated but recommends continuing this given high risk of development of status epilepticus if patient is weaned off this.  He reports patient may develop tolerance to this with time.  05/30/20 discussed again with Dr Zhao from neurology, given worsening ammonia levels and now developing transaminitis my concern is that if valproic acid could be contributing to worsening encephalopathy secondary to hyperammonemia and now leading to transaminitis. Dr Zhao agreed, from his perspective valproic acid not be adding much to the therapy anyways given he is on strong and potent 3 other antiepileptic drugs, valproic acid could be safely stopped at this time.  Patient did receive a dose this morning, will stop valproic acid per neurology recommendations and hopefully repeat an EEG over the course of next 24 to 48 hours.    Continue management of UTI with IV meropenem    Neurology team  is evaluating the patient and following and available as needed    Maintain fluid hydration, avoid development of metabolic issues including electrolyte abnormalities.  Closely monitor sodium levels.  Patient has more hypernatremia today, excessive urine output.  I have asked the nursing staff to provide the patient with an additional 1 L of LR bolus, and subsequently transition to half NS at 125 mL's per hour.  We will continue this and continue close monitoring.    TSH within normal limit, B12, folate normal    Maintain strict aspiration, fall and seizure precautions.    Acute cystitis without hematuria  Assessment & Plan  ESBL E. coli   Continue meropenem IV  Fosfomycin sensitivities have been requested, will await the results    Status epilepticus (HCC)- (present on admission)  Assessment & Plan  Patient on 4 drug antiepileptic drug therapy including topiramate, Vimpat, Keppra, valproate  Discussed with neurology, valproic acid stopped May 30, 2020 given hyperammonemia and transaminitis  Neurology team is following, neurology team to titrate antiepileptic drug therapy as clinically appropriate  Maintain seizure precautions  Continue close clinical monitoring    GBM (glioblastoma multiforme) (HCC)- (present on admission)  Assessment & Plan  s/p Left parietal craniotomy with subtotal resection. 2/26/2020  Followed by [facility]  History of radiation therapy  Previous MRI May 17, 2020 without any acute concerns, will need comparison for new findings compared to prior MRI.  Given persistent somnolence, interval MRI requested 05/27/20  Neurology team is following  Patient is on Decadron, this will need to be tapered  Will need close outpatient neurology, oncology, radiation oncology and neurosurgery follow-up    Acute respiratory failure with hypoxia (HCC)- (present on admission)  Assessment & Plan  Stable now  Patient has intermittent episode of hypoxemia concerning for obstructive sleep apnea, could be potentially  central in etiology too  Continue close pulse oximetry and oxygen support as needed    Type 2 diabetes mellitus with hyperglycemia, without long-term current use of insulin ([facility])- (present on admission)  Assessment & Plan  A1C 7.5   Likely steroid induced   Continue basal insulin, Lantus 10 units twice daily  Sliding scale insulin No. 2  Will titrate to achieve normal glycemic control  Hypoglycemic protocol in place    Bandemia- (present on admission)  Assessment & Plan  Likely secondary to steroids, contributory from underlying acute cystitis  IV meropenem    Hypokalemia- (present on admission)  Assessment & Plan  Patient remains on enteral replacement, continue and monitor daily potassium levels    Essential hypertension- (present on admission)  Assessment & Plan  Continue losartan and amlodipine  Titrate to achieve normotensive control       VTE prophylaxis: SC Lovenox

## 2020-05-30 NOTE — CARE PLAN
Problem: Safety:  Goal: Will remain free from injury  Outcome: PROGRESSING AS EXPECTED   Pt impulsive at times to sit at EOB to use urinal.     Problem: Urinary Elimination:  Goal: Ability to reestablish a normal urinary elimination pattern will improve  Outcome: PROGRESSING AS EXPECTED  Pt incontinent of urine at EOB due to increasing difficulty to use swollen hands. Condom cath placed for patient comfort and due to difficulty using hands.

## 2020-05-31 LAB
ALBUMIN SERPL BCP-MCNC: 3.4 G/DL (ref 3.2–4.9)
ALBUMIN/GLOB SERPL: 1.5 G/DL
ALP SERPL-CCNC: 48 U/L (ref 30–99)
ALT SERPL-CCNC: 87 U/L (ref 2–50)
AMMONIA PLAS-SCNC: 45 UMOL/L (ref 11–45)
ANION GAP SERPL CALC-SCNC: 11 MMOL/L (ref 7–16)
AST SERPL-CCNC: 29 U/L (ref 12–45)
BACTERIA UR CULT: ABNORMAL
BACTERIA UR CULT: ABNORMAL
BASOPHILS # BLD AUTO: 0.7 % (ref 0–1.8)
BASOPHILS # BLD: 0.05 K/UL (ref 0–0.12)
BILIRUB SERPL-MCNC: 0.2 MG/DL (ref 0.1–1.5)
BUN SERPL-MCNC: 22 MG/DL (ref 8–22)
CALCIUM SERPL-MCNC: 9.2 MG/DL (ref 8.5–10.5)
CHLORIDE SERPL-SCNC: 113 MMOL/L (ref 96–112)
CO2 SERPL-SCNC: 20 MMOL/L (ref 20–33)
CREAT SERPL-MCNC: 0.65 MG/DL (ref 0.5–1.4)
EOSINOPHIL # BLD AUTO: 0.14 K/UL (ref 0–0.51)
EOSINOPHIL NFR BLD: 1.9 % (ref 0–6.9)
ERYTHROCYTE [DISTWIDTH] IN BLOOD BY AUTOMATED COUNT: 48.9 FL (ref 35.9–50)
GLOBULIN SER CALC-MCNC: 2.3 G/DL (ref 1.9–3.5)
GLUCOSE BLD-MCNC: 141 MG/DL (ref 65–99)
GLUCOSE BLD-MCNC: 142 MG/DL (ref 65–99)
GLUCOSE BLD-MCNC: 150 MG/DL (ref 65–99)
GLUCOSE BLD-MCNC: 152 MG/DL (ref 65–99)
GLUCOSE BLD-MCNC: 171 MG/DL (ref 65–99)
GLUCOSE SERPL-MCNC: 176 MG/DL (ref 65–99)
HCT VFR BLD AUTO: 37.7 % (ref 42–52)
HGB BLD-MCNC: 12.4 G/DL (ref 14–18)
IMM GRANULOCYTES # BLD AUTO: 0.22 K/UL (ref 0–0.11)
IMM GRANULOCYTES NFR BLD AUTO: 3 % (ref 0–0.9)
LYMPHOCYTES # BLD AUTO: 0.91 K/UL (ref 1–4.8)
LYMPHOCYTES NFR BLD: 12.5 % (ref 22–41)
MAGNESIUM SERPL-MCNC: 2 MG/DL (ref 1.5–2.5)
MCH RBC QN AUTO: 31.2 PG (ref 27–33)
MCHC RBC AUTO-ENTMCNC: 32.9 G/DL (ref 33.7–35.3)
MCV RBC AUTO: 95 FL (ref 81.4–97.8)
MONOCYTES # BLD AUTO: 0.42 K/UL (ref 0–0.85)
MONOCYTES NFR BLD AUTO: 5.8 % (ref 0–13.4)
NEUTROPHILS # BLD AUTO: 5.54 K/UL (ref 1.82–7.42)
NEUTROPHILS NFR BLD: 76.1 % (ref 44–72)
NRBC # BLD AUTO: 0 K/UL
NRBC BLD-RTO: 0 /100 WBC
PHOSPHATE SERPL-MCNC: 2.8 MG/DL (ref 2.5–4.5)
PLATELET # BLD AUTO: 189 K/UL (ref 164–446)
PMV BLD AUTO: 10.5 FL (ref 9–12.9)
POTASSIUM SERPL-SCNC: 3.6 MMOL/L (ref 3.6–5.5)
PROT SERPL-MCNC: 5.7 G/DL (ref 6–8.2)
RBC # BLD AUTO: 3.97 M/UL (ref 4.7–6.1)
SIGNIFICANT IND 70042: ABNORMAL
SITE SITE: ABNORMAL
SODIUM SERPL-SCNC: 144 MMOL/L (ref 135–145)
SOURCE SOURCE: ABNORMAL
WBC # BLD AUTO: 7.3 K/UL (ref 4.8–10.8)

## 2020-05-31 PROCEDURE — 99233 SBSQ HOSP IP/OBS HIGH 50: CPT | Performed by: INTERNAL MEDICINE

## 2020-05-31 PROCEDURE — 82140 ASSAY OF AMMONIA: CPT

## 2020-05-31 PROCEDURE — 85025 COMPLETE CBC W/AUTO DIFF WBC: CPT

## 2020-05-31 PROCEDURE — A9270 NON-COVERED ITEM OR SERVICE: HCPCS | Performed by: INTERNAL MEDICINE

## 2020-05-31 PROCEDURE — 84100 ASSAY OF PHOSPHORUS: CPT

## 2020-05-31 PROCEDURE — 700102 HCHG RX REV CODE 250 W/ 637 OVERRIDE(OP): Performed by: PSYCHIATRY & NEUROLOGY

## 2020-05-31 PROCEDURE — 700105 HCHG RX REV CODE 258: Performed by: INTERNAL MEDICINE

## 2020-05-31 PROCEDURE — 700101 HCHG RX REV CODE 250: Performed by: INTERNAL MEDICINE

## 2020-05-31 PROCEDURE — 4A10X4Z MONITORING OF CENTRAL NERVOUS ELECTRICAL ACTIVITY, EXTERNAL APPROACH: ICD-10-PCS | Performed by: PSYCHIATRY & NEUROLOGY

## 2020-05-31 PROCEDURE — 95700 EEG CONT REC W/VID EEG TECH: CPT | Performed by: PSYCHIATRY & NEUROLOGY

## 2020-05-31 PROCEDURE — 99232 SBSQ HOSP IP/OBS MODERATE 35: CPT | Performed by: PSYCHIATRY & NEUROLOGY

## 2020-05-31 PROCEDURE — 700111 HCHG RX REV CODE 636 W/ 250 OVERRIDE (IP): Performed by: INTERNAL MEDICINE

## 2020-05-31 PROCEDURE — 36415 COLL VENOUS BLD VENIPUNCTURE: CPT

## 2020-05-31 PROCEDURE — 700102 HCHG RX REV CODE 250 W/ 637 OVERRIDE(OP): Performed by: INTERNAL MEDICINE

## 2020-05-31 PROCEDURE — 80053 COMPREHEN METABOLIC PANEL: CPT

## 2020-05-31 PROCEDURE — A9270 NON-COVERED ITEM OR SERVICE: HCPCS | Performed by: PSYCHIATRY & NEUROLOGY

## 2020-05-31 PROCEDURE — 95714 VEEG EA 12-26 HR UNMNTR: CPT | Performed by: PSYCHIATRY & NEUROLOGY

## 2020-05-31 PROCEDURE — 95720 EEG PHY/QHP EA INCR W/VEEG: CPT | Performed by: PSYCHIATRY & NEUROLOGY

## 2020-05-31 PROCEDURE — 82962 GLUCOSE BLOOD TEST: CPT | Mod: 91

## 2020-05-31 PROCEDURE — 770020 HCHG ROOM/CARE - TELE (206)

## 2020-05-31 PROCEDURE — 83735 ASSAY OF MAGNESIUM: CPT

## 2020-05-31 RX ORDER — LACTOBACILLUS RHAMNOSUS GG 10B CELL
1 CAPSULE ORAL
Status: DISCONTINUED | OUTPATIENT
Start: 2020-06-01 | End: 2020-06-05 | Stop reason: HOSPADM

## 2020-05-31 RX ORDER — LACTOBACILLUS RHAMNOSUS GG 10B CELL
1 CAPSULE ORAL
Status: DISCONTINUED | OUTPATIENT
Start: 2020-05-31 | End: 2020-05-31

## 2020-05-31 RX ORDER — SODIUM CHLORIDE, SODIUM LACTATE, POTASSIUM CHLORIDE, AND CALCIUM CHLORIDE .6; .31; .03; .02 G/100ML; G/100ML; G/100ML; G/100ML
1000 INJECTION, SOLUTION INTRAVENOUS ONCE
Status: COMPLETED | OUTPATIENT
Start: 2020-05-31 | End: 2020-05-31

## 2020-05-31 RX ADMIN — LACOSAMIDE 300 MG: 100 TABLET, FILM COATED ORAL at 05:13

## 2020-05-31 RX ADMIN — MEROPENEM 500 MG: 500 INJECTION, POWDER, FOR SOLUTION INTRAVENOUS at 00:59

## 2020-05-31 RX ADMIN — POTASSIUM BICARBONATE 50 MEQ: 978 TABLET, EFFERVESCENT ORAL at 05:13

## 2020-05-31 RX ADMIN — LEVETIRACETAM 1500 MG: 500 TABLET ORAL at 17:52

## 2020-05-31 RX ADMIN — SODIUM CHLORIDE: 4.5 INJECTION, SOLUTION INTRAVENOUS at 05:12

## 2020-05-31 RX ADMIN — MEROPENEM 500 MG: 500 INJECTION, POWDER, FOR SOLUTION INTRAVENOUS at 12:51

## 2020-05-31 RX ADMIN — ENOXAPARIN SODIUM 40 MG: 100 INJECTION SUBCUTANEOUS at 05:13

## 2020-05-31 RX ADMIN — SODIUM CHLORIDE: 4.5 INJECTION, SOLUTION INTRAVENOUS at 17:52

## 2020-05-31 RX ADMIN — INSULIN GLARGINE 10 UNITS: 100 INJECTION, SOLUTION SUBCUTANEOUS at 17:50

## 2020-05-31 RX ADMIN — TOPIRAMATE 200 MG: 100 TABLET, FILM COATED ORAL at 05:13

## 2020-05-31 RX ADMIN — LACOSAMIDE 300 MG: 100 TABLET, FILM COATED ORAL at 17:52

## 2020-05-31 RX ADMIN — DEXAMETHASONE 2 MG: 0.5 ELIXIR ORAL at 05:14

## 2020-05-31 RX ADMIN — MEROPENEM 500 MG: 500 INJECTION, POWDER, FOR SOLUTION INTRAVENOUS at 17:56

## 2020-05-31 RX ADMIN — Medication 1 CAPSULE: at 10:26

## 2020-05-31 RX ADMIN — AMLODIPINE BESYLATE 5 MG: 5 TABLET ORAL at 05:13

## 2020-05-31 RX ADMIN — SODIUM CHLORIDE, POTASSIUM CHLORIDE, SODIUM LACTATE AND CALCIUM CHLORIDE 1000 ML: 600; 310; 30; 20 INJECTION, SOLUTION INTRAVENOUS at 10:25

## 2020-05-31 RX ADMIN — MEROPENEM 500 MG: 500 INJECTION, POWDER, FOR SOLUTION INTRAVENOUS at 23:30

## 2020-05-31 RX ADMIN — TOPIRAMATE 200 MG: 100 TABLET, FILM COATED ORAL at 17:52

## 2020-05-31 RX ADMIN — LOSARTAN POTASSIUM 100 MG: 50 TABLET, FILM COATED ORAL at 17:52

## 2020-05-31 RX ADMIN — LEVETIRACETAM 1500 MG: 500 TABLET ORAL at 05:13

## 2020-05-31 RX ADMIN — MEROPENEM 500 MG: 500 INJECTION, POWDER, FOR SOLUTION INTRAVENOUS at 05:14

## 2020-05-31 RX ADMIN — INSULIN GLARGINE 10 UNITS: 100 INJECTION, SOLUTION SUBCUTANEOUS at 05:38

## 2020-05-31 ASSESSMENT — FIBROSIS 4 INDEX: FIB4 SCORE: 1

## 2020-05-31 NOTE — PROGRESS NOTES
NEUROLOGY PROGRESS NOTE      BACKGROUND:    60 y.o. male was admitted on 5/13/2020 10:34 PM for Seizure (HCC) [R56.9].       SUBJECTIVE:   Asked to reevaluate the patient by Dr. Pool.  The patient is obtunded, does not open his eyes and not following commands despite improving his ammonia level.  No clinical seizure-like activity reported.    VITALS:  Vitals:    05/31/20 0022 05/31/20 0400 05/31/20 0600 05/31/20 0800   BP:  154/91  149/97   Pulse:  80  85   Resp: 16 16  20   Temp:  36.8 °C (98.3 °F)  36.7 °C (98 °F)   TempSrc:  Temporal  Temporal   SpO2:  94%  99%   Weight:   90.5 kg (199 lb 8.3 oz)    Height:           NEUROLOGICAL EXAM:   He is obtunded and not able to follow commands or open his eyes.  Pupils are equal and reactive.  Extraocular movement cannot be tested.  I do not appreciate facial asymmetry.  Facial sensation cannot be tested.  He withdraws to physical stimulation more in left upper and lower extremity than right.  Coordination cannot be tested.  Deep tendon reflexes are 1+ and equal.    OBJECTIVE:    NEUROIMAGING:    MR-BRAIN-WITH & W/O   Final Result      1.  Postsurgical changes left parietal craniotomy again noted. No significant change in thick walled rim enhancing left parietal mass/resection cavity and adjacent separate nodular focus of enhancement. Surrounding vasogenic edema and/or nonenhancing    tumor is also unchanged with stable mass effect.   2.  No acute infarct or new area of hemorrhage.   3.  No significant interval change.      DX-ABDOMEN FOR TUBE PLACEMENT   Final Result      Feeding tube tip overlies the upper gastric body.      DX-CHEST-LIMITED (1 VIEW)   Final Result      1.  Feeding tube tip located high in position at the level of the gastroesophageal junction. Advancement is recommended.      2.  Cardiomegaly.      This was discussed with the patient's ICU nurse at 8:22 AM on 5/27/2020.      DX-ABDOMEN FOR TUBE PLACEMENT   Final Result      Cortrak nasogastric tube  position consistent with intragastric placement.      DX-CHEST-PORTABLE (1 VIEW)   Final Result      1.  No focal pulmonary consolidation.      2.  Extubation      DX-ABDOMEN FOR TUBE PLACEMENT   Final Result      Enteric tube tip projects over the stomach.      DX-CHEST-PORTABLE (1 VIEW)   Final Result      No significant change from prior exam.      DX-ABDOMEN FOR TUBE PLACEMENT   Final Result      Enteric tube tip projects over the distal stomach.      DX-CHEST-PORTABLE (1 VIEW)   Final Result      No significant change from prior exam.      DX-ABDOMEN FOR TUBE PLACEMENT   Final Result      Enteric tube terminates over the stomach.      DX-CHEST-PORTABLE (1 VIEW)   Final Result      Increasing left basilar opacity could be from atelectasis or consolidation      DX-CHEST-PORTABLE (1 VIEW)   Final Result      Stable chest except findings with bibasilar atelectasis.      DX-CHEST-PORTABLE (1 VIEW)   Final Result      Stable chest x-ray findings.      DX-CHEST-PORTABLE (1 VIEW)   Final Result      Stable hypoventilatory chest with stable hazy left basilar opacity could be from atelectasis or consolidation favored over pleural fluid      DX-CHEST-PORTABLE (1 VIEW)   Final Result         1. No significant interval change.      US-DUPLEX TESTICLE COMPLETE (COMBO)   Final Result      Large bilateral hydroceles, right more than left.      Unremarkable bilateral testes.      DX-CHEST-PORTABLE (1 VIEW)   Final Result         1. No significant interval change.      MR-BRAIN-WITH & W/O   Final Result      1.  No acute hemorrhage or ischemia   2.  Two areas of enhancement in the LEFT parietal tumor resection bed which could be posttherapeutic change or indicative of residual or recurrent glioblastoma. Direct comparison with prior imaging would be helpful to further assess.   3.  3 mm of LEFT-to-RIGHT midline shift   4.  Atrophy   5.  White matter changes      DX-ABDOMEN FOR TUBE PLACEMENT   Final Result      Cortrak feeding  tube tip projects in the region of the distal stomach.      DX-CHEST-LIMITED (1 VIEW)   Final Result      1.  New right subclavian vein central line tip projects in satisfactory position. No pneumothorax.      2.  Satisfactory position of endotracheal tube.      3.  Feeding tube is now present.      4.  No focal pulmonary consolidation.      DX-CHEST-PORTABLE (1 VIEW)   Final Result      1.  Interval placement of an endotracheal tube which terminates in satisfactory position at the level of the aortic arch.   2.  Mild right basilar atelectasis and/or consolidation.      DX-CHEST-PORTABLE (1 VIEW)   Final Result      No radiographic evidence of acute cardiopulmonary process.      CT-CTA NECK WITH & W/O-POST PROCESSING   Final Result      CT angiogram of the neck within normal limits.      CT-CTA HEAD WITH & W/O-POST PROCESS   Final Result      Left parietal mass resection with some blood vessels along the  operative bed margins. These could indicate healing response although local recurrence is also possible. Comparison with prior studies and correlation with operative history may prove    helpful to clarify      No acute arterial occlusion is identified      CT-HEAD W/O   Final Result      No acute intracranial hemorrhage is identified.      Left parietal vertex craniotomy with underlying vasogenic edema and mass with associated mild rightward midline shift, moderate left lateral ventricular effacement          MEDICATIONS:  Current Facility-Administered Medications   Medication Dose Route Frequency Provider Last Rate Last Dose   • lactobacillus rhamnosus (CULTURELLE) capsule 1 Cap  1 Cap Oral QDAY with Breakfast Liliana Pool M.D.       • lactated ringer BOLUS infusion  1,000 mL Intravenous Once Liliana Pool M.D.       • 1/2 NS infusion   Intravenous Continuous Liliana Pool M.D. 125 mL/hr at 05/31/20 0512     • insulin glargine (LANTUS) injection 10 Units  10 Units Subcutaneous BID Liliana Pool M.D.   10 Units at  05/31/20 0538    And   • insulin lispro (HUMALOG) injection 2-9 Units  2-9 Units Subcutaneous Q6HRS Liliana Pool M.D.   Stopped at 05/30/20 1800    And   • glucose 4 g chewable tablet 16 g  16 g Enteral Tube Q15 MIN PRN Liliana Pool M.D.        And   • dextrose 50% (D50W) injection 50 mL  50 mL Intravenous Q15 MIN PRN Liliana Pool M.D.       • meropenem (MERREM) 500 mg in  mL IVPB  500 mg Intravenous Q6HRS Liliana oPol M.D.   Stopped at 05/31/20 0544   • dexamethasone (DECADRON) 0.5 MG/5ML elixir 2 mg  2 mg Enteral Tube DAILY Ahmet Lopez M.D.   2 mg at 05/31/20 0514   • potassium bicarbonate (KLYTE) effervescent tablet 50 mEq  50 mEq Enteral Tube DAILY Nik Jones M.D.   50 mEq at 05/31/20 0513   • lacosamide (VIMPAT) tablet 300 mg  300 mg Enteral Tube BID Nik Jones M.D.   300 mg at 05/31/20 0513   • levETIRAcetam (KEPPRA) tablet 1,500 mg  1,500 mg Enteral Tube BID Nik Jones M.D.   1,500 mg at 05/31/20 0513   • amLODIPine (NORVASC) tablet 5 mg  5 mg Enteral Tube Q DAY Nik Jones M.D.   5 mg at 05/31/20 0513   • enoxaparin (LOVENOX) inj 40 mg  40 mg Subcutaneous DAILY Colton Perez M.D.   40 mg at 05/31/20 0513   • Pharmacy Consult: Enteral tube insertion - review meds/change route/product selection  1 Each Other PHARMACY TO DOSE Colton Perez M.D.       • losartan (COZAAR) tablet 100 mg  100 mg Enteral Tube DAILY Colton Perez M.D.   100 mg at 05/30/20 1728   • topiramate (TOPAMAX) tablet 200 mg  200 mg Enteral Tube Q12HRS Colton Perez M.D.   200 mg at 05/31/20 0513   • acetaminophen (TYLENOL) tablet 650 mg  650 mg Enteral Tube Q6HRS PRN Colton Perez M.D.   650 mg at 05/20/20 1937   • ondansetron (ZOFRAN ODT) dispertab 4 mg  4 mg Enteral Tube Q4HRS PRN Colton Perez M.D.       • Respiratory Therapy Consult   Nebulization Continuous RT Nikhil Erickson D.O.       • ipratropium-albuterol (DUONEB) nebulizer solution  3 mL Nebulization Q2HRS PRN (RT) Nikhil Erickson D.O.       •  ondansetron (ZOFRAN) syringe/vial injection 4 mg  4 mg Intravenous Q4HRS PRN Igor Carlos M.D.       • LORazepam (ATIVAN) injection 4 mg  4 mg Intravenous Q10 MIN PRN Igor Carlos M.D.   4 mg at 05/15/20 1545   • hydrALAZINE (APRESOLINE) injection 10 mg  10 mg Intravenous Q6HRS PRN Guido Pierre M.D.   10 mg at 05/30/20 2319       LABS:      Recent Labs     05/29/20 0325 05/30/20 0314 05/31/20  0439   WBC 8.2 6.6 7.3   RBC 3.89* 4.06* 3.97*   HEMOGLOBIN 12.1* 12.8* 12.4*   HEMATOCRIT 37.8* 39.7* 37.7*   MCV 97.2 97.8 95.0   MCH 31.1 31.5 31.2   MCHC 32.0* 32.2* 32.9*   RDW 51.4* 51.7* 48.9   PLATELETCT 179 183 189   MPV 10.9 10.8 10.5     Recent Labs     05/29/20 0325 05/30/20 0314 05/31/20  0439   SODIUM 148* 150* 144   POTASSIUM 3.4* 3.7 3.6   CHLORIDE 114* 117* 113*   CO2 21 21 20   GLUCOSE 172* 158* 176*   BUN 32* 27* 22     INR   Date Value Ref Range Status   05/13/2020 0.87 0.87 - 1.13 Final     Comment:     INR - Non-therapeutic Reference Range: 0.87-1.13  INR - Therapeutic Reference Range: 2.0-4.0       No results found for: POCINR  Lab Results   Component Value Date/Time    CREATININE 0.81 05/25/2020 0015     Lab Results   Component Value Date/Time    IFAFRICA >60 05/25/2020 0015    IFNOTAFR >60 05/25/2020 0015     EEG (5/22/2020:  This is an abnormal video EEG recording in a sedated patient. There is slowing in the left hemisphere. There is slowing in the left hemisphere. Continuous left posterior quadrant spikes/polyspikes and sharps noted with frequent but brief runs of left lateralized periodic discharges. No clear seizures captured.  The patient remains at high risk for seizure recurrence. The findings suggest a large underlying area of cortical irritability and structural abnormality. Clinical and radiological correlation is recommended.    EEG (5/27/20):  This is an abnormal routine EEG recording in the awake, drowsy, and sleep states. There is slowing in the left  hemisphere. Frequent left posterior quadrant sharps noted with brief runs of left lateralized periodic discharges. No  seizures captured.  The patient remains at high risk for seizure recurrence. The findings suggest a large underlying area of cortical irritability and structural abnormality. Clinical and radiological correlation is recommended.    ASSESSMENT AND PLAN:  60-year-old male with history of glioblastoma multiforme,  status post surgical resection who was admitted on 5/15/20 for evaluation of status epilepticus.  No more clinical seizure activity reported.  He will continue with Keppra 1500 mg twice a day, Vimpat 300 mg twice a day and Topamax 200 mg twice a day.  His valproic acid was discontinued yesterday due to elevated liver enzymes and hyperammonemia.    Repeat brain MRI on 5/27/2020 revealed:  1.  Postsurgical changes left parietal craniotomy again noted. No significant change in thick walled rim enhancing left parietal mass/resection cavity and adjacent separate nodular focus of enhancement. Surrounding vasogenic edema and/or nonenhancing   tumor is also unchanged with stable mass effect.  2.  No acute infarct or new area of hemorrhage.  3.  No significant interval change.  Due to new changes in his mentation that he is now obtunded after stopping valproic acid and despite improvement in his ammonia level will proceed with continuous EEG monitoring to make sure he has not developed subclinical seizure again.  Discussed with Dr. Pool.

## 2020-05-31 NOTE — PROGRESS NOTES
Hospital Medicine Daily Progress Note    Date of Service  5/31/2020    Chief Complaint  60 y.o. male admitted 5/13/2020 with seizure    Hospital Course  60 y.o. male admitted 5/13/2020 with a history of GBM and seizures.  Hospitalization complicated by status epilepticus, requiring ICU stay.    Interval Problem Update  Patient seen and evaluated on rounds  Discussed with nursing staff on rounds    Patient is more somnolent today, opening eyes to pain, incomprehensible sounds and withdraws from pain.    No bowel movements overnight per nursing    Sodium levels remained stable, advised to repeat another 1 L LR bolus and continue half NS    ESBL E. coli, continue meropenem, sensitive to fosfomycin    Discussed with neurology, starting continuous EEG    Plan of care discussed over the phone personally by me with daughter Linh, updated regarding plan of care, underlying medical concerns including treatment of ESBL E. coli, disposition planning, all questions and concerns answered personally by me.. 05/31/20    I discussed the case with hospitalist JERO Guerrero, daily video conferencing with family to continue on a daily basis.    Patient has testicular swelling, increased to the right side.  Noted finding of hydrocele.    We will recheck ammonia levels tomorrow    Valproic acid remains stopped    Acute cystitis, continue intravenous meropenem.    Continue gentle fluid hydration.    Linh [phone] to be updated for any emergent concerns.  They would appreciate daily updates.      Palliative care on board    Consultants/Specialty  Neurology  Pulmonology/critical care  Palliative care    Code Status  Full code    Disposition  Will need aggressive PT, OT, SLP evaluations determine disposition planning once somnolence has improved.    Review of Systems  Review of Systems   Unable to perform ROS: Acuity of condition        Physical Exam  Temp:  [36.1 °C (96.9 °F)-36.8 °C (98.3 °F)] 36.1 °C (96.9 °F)  Pulse:  [61-85] 85  Resp:   [14-20] 14  BP: (140-173)/() 153/105  SpO2:  [94 %-99 %] 98 %    Physical Exam  Constitutional:       Appearance: He is obese. He is diaphoretic. He is not ill-appearing or toxic-appearing.      Comments: Slightly diaphoretic, somnolent  Body mass index is 32.25 kg/m².   HENT:      Head: Normocephalic.      Nose: Nose normal.      Comments: Ng tube in place     Mouth/Throat:      Mouth: Mucous membranes are moist.   Eyes:      Conjunctiva/sclera: Conjunctivae normal.   Neck:      Musculoskeletal: Normal range of motion.   Cardiovascular:      Rate and Rhythm: Normal rate.      Pulses: Normal pulses.      Heart sounds: Normal heart sounds. No murmur.   Pulmonary:      Effort: Pulmonary effort is normal.      Breath sounds: Normal breath sounds. No rhonchi or rales.      Comments: Diminished in bases  Chest:      Chest wall: No tenderness.   Abdominal:      General: Abdomen is flat. Bowel sounds are normal. There is no distension.      Palpations: Abdomen is soft.      Tenderness: There is no abdominal tenderness. There is no right CVA tenderness, left CVA tenderness, guarding or rebound.   Genitourinary:     Comments: Patient has testicular swelling, increased to the right side  Musculoskeletal:         General: No swelling or deformity.      Right lower leg: No edema.      Left lower leg: No edema.   Skin:     General: Skin is warm.   Neurological:      Comments: Somnolent, GCS is 8.  Pupil responsive.   Psychiatric:      Comments: Somnolent.         Fluids    Intake/Output Summary (Last 24 hours) at 5/31/2020 1556  Last data filed at 5/31/2020 1000  Gross per 24 hour   Intake 2900 ml   Output 3300 ml   Net -400 ml       Laboratory  Recent Labs     05/29/20  0325 05/30/20  0314 05/31/20  0439   WBC 8.2 6.6 7.3   RBC 3.89* 4.06* 3.97*   HEMOGLOBIN 12.1* 12.8* 12.4*   HEMATOCRIT 37.8* 39.7* 37.7*   MCV 97.2 97.8 95.0   MCH 31.1 31.5 31.2   MCHC 32.0* 32.2* 32.9*   RDW 51.4* 51.7* 48.9   PLATELETCT 179 183 189    MPV 10.9 10.8 10.5     Recent Labs     05/29/20  0325 05/30/20  0314 05/31/20  0439   SODIUM 148* 150* 144   POTASSIUM 3.4* 3.7 3.6   CHLORIDE 114* 117* 113*   CO2 21 21 20   GLUCOSE 172* 158* 176*   BUN 32* 27* 22   CREATININE 0.72 0.71 0.65   CALCIUM 9.1 8.9 9.2                   Imaging       Assessment/Plan  * Encephalopathy acute- (present on admission)  Assessment & Plan  I discussed with the family 05/27/20  Patient independent relatively with ADLs, IADLs prior to presentation    Now increasingly somnolent  GCS today 05/31/20 is 8    Etiologies considered include: Polypharmacy/pharmacological/iatrogenic, infectious (UTI), /others.    Scopolamine patch which can be sedated - stopped 05/27/2020     I have discussed with Dr. Zhao from neurology, he agrees that the AED therapy could be sedated but recommends continuing this given high risk of development of status epilepticus if patient is weaned off this.  He reports patient may develop tolerance to this with time.  05/30/20 discussed again with Dr Zhao from neurology, given worsening ammonia levels and now developing transaminitis my concern is that if valproic acid could be contributing to worsening encephalopathy secondary to hyperammonemia and now leading to transaminitis. Dr Zhao agreed, from his perspective valproic acid not be adding much to the therapy anyways given he is on strong and potent 3 other antiepileptic drugs, valproic acid could be safely stopped at this time.  Patient did receive a dose this morning, will stop valproic acid per neurology recommendations and hopefully repeat an EEG over the course of next 24 to 48 hours.  On May 31, 2020, patient is more somnolent.  Discussed and evaluated the patient with Dr Zhao, from neurology and decision to initiate 24-hour continuous EEG again.    Continue management of UTI with IV meropenem    Neurology team is evaluating the patient and following and available as needed    Maintain fluid  hydration, avoid development of metabolic issues including electrolyte abnormalities.  Closely monitor sodium levels.  Patient has more hypernatremia today, excessive urine output.  I have asked the nursing staff to provide the patient with an additional 1 L of LR bolus, and subsequently transition to half NS at 125 mL's per hour.  We will continue this and continue close monitoring.    TSH within normal limit, B12, folate normal    Maintain strict aspiration, fall and seizure precautions.    Acute cystitis without hematuria  Assessment & Plan  ESBL E. coli   Continue meropenem IV  Sensitive to fosfomycin  May consider fosfomycin over the coming days    Status epilepticus (HCC)- (present on admission)  Assessment & Plan  Patient on 4 drug antiepileptic drug therapy including topiramate, Vimpat, Keppra, valproate  Discussed with neurology, valproic acid stopped May 30, 2020 given hyperammonemia and transaminitis  Neurology team is following, neurology team to titrate antiepileptic drug therapy as clinically appropriate  Maintain seizure precautions  Continue close clinical monitoring    GBM (glioblastoma multiforme) (HCC)- (present on admission)  Assessment & Plan  s/p Left parietal craniotomy with subtotal resection. 2/26/2020  [facility]  History of radiation therapy  Previous MRI May 17, 2020 without any acute concerns, will need comparison for new findings compared to prior MRI.  Given persistent somnolence, interval MRI requested 05/27/20  Neurology team is following  Patient is on Decadron, this will need to be tapered  Will need close outpatient neurology, oncology, radiation oncology and neurosurgery follow-up    Acute respiratory failure with hypoxia (HCC)- (present on admission)  Assessment & Plan  Stable now  Patient has intermittent episode of hypoxemia concerning for obstructive sleep apnea, could be potentially central in etiology too  Continue close pulse oximetry and oxygen support as  needed    Type 2 diabetes mellitus with hyperglycemia, without long-term current use of insulin ([facility])- (present on admission)  Assessment & Plan  A1C 7.5   Likely steroid induced   Continue basal insulin, Lantus 10 units twice daily  Sliding scale insulin No. 2  Will titrate to achieve normal glycemic control  Hypoglycemic protocol in place    Bandemia- (present on admission)  Assessment & Plan  Likely secondary to steroids, contributory from underlying acute cystitis  IV meropenem    Hypokalemia- (present on admission)  Assessment & Plan  Patient remains on enteral replacement, continue and monitor daily potassium levels    Essential hypertension- (present on admission)  Assessment & Plan  Continue losartan and amlodipine  Titrate to achieve normotensive control       VTE prophylaxis: SC Lovenox

## 2020-05-31 NOTE — PROGRESS NOTES
Received handoff report from day shift RN. Received report that the pt. became increasingly confused and less responsive this AM. Noted that ammonia was high, medications changed. The pt. had several loose stools this AM.       The pt. Is lethargic and difficult to arouse. Pt. required noxious stimuli to (pain, yelling, and sternal rub) to evoke a very slight response. The pt. groaned once and did not open his eyes. The pt. squeezed this RN's fingers with L hand, but unclear if this was on command or not because the pt. would not release this RN's fingers when told to do so. RUE, BLE unresponsive to soft/hard touch, and no withdraw to pain. Pupils equal and reactive to light.     The pt. does not appear to be in any pain at this time.

## 2020-05-31 NOTE — EEG PROGRESS NOTE
The patient was hooked up for continuous EEG with CT compatible leads. These are not compatible for MRI. If patient needs to go to MRI, please call the EEG department at ext. [phone] or the [facility] after hours.

## 2020-05-31 NOTE — PROGRESS NOTES
Monitor summary: SR 65-86, MT 0.14, QRS 0.10, QT 0.36, frequent PVCs and occasional PACs per strip from monitor room.

## 2020-05-31 NOTE — CARE PLAN
Problem: Safety  Goal: Will remain free from injury  Outcome: PROGRESSING AS EXPECTED  Note: Fall, seizure, aspiration precautions in place/   Goal: Will remain free from falls  Outcome: PROGRESSING AS EXPECTED  Note: Fall precautions and bed alarm in place.      Problem: Safety:  Goal: Will remain free from injury  Outcome: PROGRESSING AS EXPECTED  Note: Fall, seizure, aspiration precautions in place/      Problem: Communication  Goal: The ability to communicate needs accurately and effectively will improve  Outcome: PROGRESSING SLOWER THAN EXPECTED  Note: Pt. Is nonverbal at this time, and requires vigorous stimulation to arouse.

## 2020-05-31 NOTE — PROGRESS NOTES
Pt lethargic all day. Alerted MD and NP. Fluid bolus was initiated.   Pt had multiple (7) loose bowel movements today. Pt was changed frequently and barrier cream applied. Incontinent dermatitis present, skin remained intact throughout the shift. Lactulose discontinued.

## 2020-05-31 NOTE — PROGRESS NOTES
Called family. Spoke with daughters and SO. Updated on POC.Placed phone to pt's ear. Pt attempted to wake up. Made groaning noises, tapped his leg, and squeezed my hand. Family OK. To call back for zoom call at 1400.

## 2020-05-31 NOTE — PROCEDURES
VIDEO ELECTROENCEPHALOGRAM REPORT        Referring provider: Dr. Zhao.      DOS: 5/31/2020 (total recording of 17 hours and 17 minutes).      INDICATION:  Amhet Escobedo 60 y.o. male presenting with h/o brain tumor, seizures.      CURRENT ANTIEPILEPTIC REGIMEN: Levetiracetam 1500 mg q 12 hrs, Lacosamide 300 mg q 12 hrs, and Topiramate 200 mg q 12 hrs. No longer on Valproic Acid.      TECHNIQUE: 30 channel video electroencephalogram (EEG) was performed in accordance with the international 10-20 system. The study was reviewed in bipolar and referential montages. The recording examined a patient during wakeful, drowsy, and sleep states.      DESCRIPTION OF THE RECORD:  During the awake state, the background showed an asymmetric 7 hz theta activity on the right hemisphere. There is slowing on the left cerebral hemisphere. Continuous sharps in the left posterior quadrant, intermittently becoming briefly periodic or rhythmic, with suggestion of a few brief focal left posterior quadrant seizures during the study.      During the drowsy state, theta/delta activity was seen.      During the sleep state, higher amplitude delta activity noted.         ACTIVATION PROCEDURES:   Not performed.      EKG: sampling of the EKG recording demonstrated sinus rhythm.      EVENTS: None.      INTERPRETATION:  This is an abnormal 24 hrs video EEG recording in the awake, drowsy, and sleep states. A mild encephalopathy is suggested. There is slowing on the left cerebral hemisphere. Continuous sharps in the left posterior quadrant, intermittently becoming briefly periodic or rhythmic, with suggestion of a few brief focal left posterior quadrant seizures during the study. The findings suggest a large underlying area of cortical irritability and structural abnormality. Clinical and radiological correlation is recommended.    Updates provided to Dr. Carrasco.         Davion Wise MD   Epilepsy and Neurodiagnostics.   Clinical   of Neurology [facility].   Diplomate in Neurology, Epilepsy, and Electrodiagnostic Medicine.   Office: [phone]  Fax: [phone]

## 2020-06-01 ENCOUNTER — APPOINTMENT (OUTPATIENT)
Dept: RADIOLOGY | Facility: MEDICAL CENTER | Age: 60
DRG: 100 | End: 2020-06-01
Attending: INTERNAL MEDICINE
Payer: OTHER MISCELLANEOUS

## 2020-06-01 PROBLEM — R35.89 POLYURIA: Status: ACTIVE | Noted: 2020-06-01

## 2020-06-01 LAB
ALBUMIN SERPL BCP-MCNC: 3.3 G/DL (ref 3.2–4.9)
ALBUMIN/GLOB SERPL: 1.5 G/DL
ALP SERPL-CCNC: 46 U/L (ref 30–99)
ALT SERPL-CCNC: 110 U/L (ref 2–50)
AMMONIA PLAS-SCNC: 22 UMOL/L (ref 11–45)
ANION GAP SERPL CALC-SCNC: 12 MMOL/L (ref 7–16)
AST SERPL-CCNC: 42 U/L (ref 12–45)
BACTERIA BLD CULT: NORMAL
BACTERIA BLD CULT: NORMAL
BASOPHILS # BLD AUTO: 0.7 % (ref 0–1.8)
BASOPHILS # BLD: 0.05 K/UL (ref 0–0.12)
BILIRUB SERPL-MCNC: 0.2 MG/DL (ref 0.1–1.5)
BUN SERPL-MCNC: 20 MG/DL (ref 8–22)
CALCIUM SERPL-MCNC: 8.7 MG/DL (ref 8.5–10.5)
CHLORIDE SERPL-SCNC: 107 MMOL/L (ref 96–112)
CO2 SERPL-SCNC: 21 MMOL/L (ref 20–33)
CREAT SERPL-MCNC: 0.64 MG/DL (ref 0.5–1.4)
CRP SERPL HS-MCNC: 0.59 MG/DL (ref 0–0.75)
EOSINOPHIL # BLD AUTO: 0.15 K/UL (ref 0–0.51)
EOSINOPHIL NFR BLD: 2 % (ref 0–6.9)
ERYTHROCYTE [DISTWIDTH] IN BLOOD BY AUTOMATED COUNT: 46.9 FL (ref 35.9–50)
GLOBULIN SER CALC-MCNC: 2.2 G/DL (ref 1.9–3.5)
GLUCOSE BLD-MCNC: 135 MG/DL (ref 65–99)
GLUCOSE BLD-MCNC: 137 MG/DL (ref 65–99)
GLUCOSE BLD-MCNC: 176 MG/DL (ref 65–99)
GLUCOSE SERPL-MCNC: 148 MG/DL (ref 65–99)
HCT VFR BLD AUTO: 38.6 % (ref 42–52)
HGB BLD-MCNC: 13.2 G/DL (ref 14–18)
IMM GRANULOCYTES # BLD AUTO: 0.29 K/UL (ref 0–0.11)
IMM GRANULOCYTES NFR BLD AUTO: 4 % (ref 0–0.9)
LYMPHOCYTES # BLD AUTO: 1.14 K/UL (ref 1–4.8)
LYMPHOCYTES NFR BLD: 15.6 % (ref 22–41)
MAGNESIUM SERPL-MCNC: 1.9 MG/DL (ref 1.5–2.5)
MCH RBC QN AUTO: 32 PG (ref 27–33)
MCHC RBC AUTO-ENTMCNC: 34.2 G/DL (ref 33.7–35.3)
MCV RBC AUTO: 93.7 FL (ref 81.4–97.8)
MONOCYTES # BLD AUTO: 0.4 K/UL (ref 0–0.85)
MONOCYTES NFR BLD AUTO: 5.5 % (ref 0–13.4)
NEUTROPHILS # BLD AUTO: 5.29 K/UL (ref 1.82–7.42)
NEUTROPHILS NFR BLD: 72.2 % (ref 44–72)
NRBC # BLD AUTO: 0 K/UL
NRBC BLD-RTO: 0 /100 WBC
PHOSPHATE SERPL-MCNC: 2.9 MG/DL (ref 2.5–4.5)
PLATELET # BLD AUTO: 197 K/UL (ref 164–446)
PMV BLD AUTO: 10.3 FL (ref 9–12.9)
POTASSIUM SERPL-SCNC: 4.6 MMOL/L (ref 3.6–5.5)
PREALB SERPL-MCNC: 33.1 MG/DL (ref 18–38)
PROT SERPL-MCNC: 5.5 G/DL (ref 6–8.2)
RBC # BLD AUTO: 4.12 M/UL (ref 4.7–6.1)
SIGNIFICANT IND 70042: NORMAL
SIGNIFICANT IND 70042: NORMAL
SITE SITE: NORMAL
SITE SITE: NORMAL
SODIUM SERPL-SCNC: 140 MMOL/L (ref 135–145)
SOURCE SOURCE: NORMAL
SOURCE SOURCE: NORMAL
WBC # BLD AUTO: 7.3 K/UL (ref 4.8–10.8)

## 2020-06-01 PROCEDURE — 700111 HCHG RX REV CODE 636 W/ 250 OVERRIDE (IP): Performed by: INTERNAL MEDICINE

## 2020-06-01 PROCEDURE — 36415 COLL VENOUS BLD VENIPUNCTURE: CPT

## 2020-06-01 PROCEDURE — A9270 NON-COVERED ITEM OR SERVICE: HCPCS | Performed by: INTERNAL MEDICINE

## 2020-06-01 PROCEDURE — A9270 NON-COVERED ITEM OR SERVICE: HCPCS | Performed by: PSYCHIATRY & NEUROLOGY

## 2020-06-01 PROCEDURE — 700102 HCHG RX REV CODE 250 W/ 637 OVERRIDE(OP): Performed by: INTERNAL MEDICINE

## 2020-06-01 PROCEDURE — 05HF33Z INSERTION OF INFUSION DEVICE INTO LEFT CEPHALIC VEIN, PERCUTANEOUS APPROACH: ICD-10-PCS | Performed by: INTERNAL MEDICINE

## 2020-06-01 PROCEDURE — 84100 ASSAY OF PHOSPHORUS: CPT

## 2020-06-01 PROCEDURE — 700105 HCHG RX REV CODE 258: Performed by: INTERNAL MEDICINE

## 2020-06-01 PROCEDURE — 770020 HCHG ROOM/CARE - TELE (206)

## 2020-06-01 PROCEDURE — 85025 COMPLETE CBC W/AUTO DIFF WBC: CPT

## 2020-06-01 PROCEDURE — 700101 HCHG RX REV CODE 250: Performed by: INTERNAL MEDICINE

## 2020-06-01 PROCEDURE — 82140 ASSAY OF AMMONIA: CPT

## 2020-06-01 PROCEDURE — 95720 EEG PHY/QHP EA INCR W/VEEG: CPT | Performed by: PSYCHIATRY & NEUROLOGY

## 2020-06-01 PROCEDURE — 84134 ASSAY OF PREALBUMIN: CPT

## 2020-06-01 PROCEDURE — 95714 VEEG EA 12-26 HR UNMNTR: CPT | Performed by: PSYCHIATRY & NEUROLOGY

## 2020-06-01 PROCEDURE — 83735 ASSAY OF MAGNESIUM: CPT

## 2020-06-01 PROCEDURE — 99233 SBSQ HOSP IP/OBS HIGH 50: CPT | Mod: 25 | Performed by: PSYCHIATRY & NEUROLOGY

## 2020-06-01 PROCEDURE — 86140 C-REACTIVE PROTEIN: CPT

## 2020-06-01 PROCEDURE — 80053 COMPREHEN METABOLIC PANEL: CPT

## 2020-06-01 PROCEDURE — 700102 HCHG RX REV CODE 250 W/ 637 OVERRIDE(OP): Performed by: PSYCHIATRY & NEUROLOGY

## 2020-06-01 PROCEDURE — 99233 SBSQ HOSP IP/OBS HIGH 50: CPT | Performed by: INTERNAL MEDICINE

## 2020-06-01 PROCEDURE — 4A10X4Z MONITORING OF CENTRAL NERVOUS ELECTRICAL ACTIVITY, EXTERNAL APPROACH: ICD-10-PCS | Performed by: PSYCHIATRY & NEUROLOGY

## 2020-06-01 PROCEDURE — 76937 US GUIDE VASCULAR ACCESS: CPT

## 2020-06-01 PROCEDURE — 82962 GLUCOSE BLOOD TEST: CPT | Mod: 91

## 2020-06-01 RX ORDER — INSULIN GLARGINE 100 [IU]/ML
12 INJECTION, SOLUTION SUBCUTANEOUS 2 TIMES DAILY
Status: DISCONTINUED | OUTPATIENT
Start: 2020-06-01 | End: 2020-06-05 | Stop reason: HOSPADM

## 2020-06-01 RX ORDER — SODIUM CHLORIDE, SODIUM LACTATE, POTASSIUM CHLORIDE, CALCIUM CHLORIDE 600; 310; 30; 20 MG/100ML; MG/100ML; MG/100ML; MG/100ML
1000 INJECTION, SOLUTION INTRAVENOUS CONTINUOUS
Status: DISCONTINUED | OUTPATIENT
Start: 2020-06-01 | End: 2020-06-01

## 2020-06-01 RX ORDER — SODIUM CHLORIDE, SODIUM LACTATE, POTASSIUM CHLORIDE, AND CALCIUM CHLORIDE .6; .31; .03; .02 G/100ML; G/100ML; G/100ML; G/100ML
500 INJECTION, SOLUTION INTRAVENOUS ONCE
Status: COMPLETED | OUTPATIENT
Start: 2020-06-01 | End: 2020-06-01

## 2020-06-01 RX ORDER — DEXTROSE MONOHYDRATE 25 G/50ML
50 INJECTION, SOLUTION INTRAVENOUS
Status: DISCONTINUED | OUTPATIENT
Start: 2020-06-01 | End: 2020-06-05 | Stop reason: HOSPADM

## 2020-06-01 RX ORDER — GABAPENTIN 100 MG/1
200 CAPSULE ORAL 2 TIMES DAILY
Status: DISCONTINUED | OUTPATIENT
Start: 2020-06-01 | End: 2020-06-02

## 2020-06-01 RX ORDER — DEXAMETHASONE 1 MG
2 TABLET ORAL DAILY
Status: DISCONTINUED | OUTPATIENT
Start: 2020-06-02 | End: 2020-06-04

## 2020-06-01 RX ADMIN — LOSARTAN POTASSIUM 100 MG: 50 TABLET, FILM COATED ORAL at 18:36

## 2020-06-01 RX ADMIN — INSULIN GLARGINE 12 UNITS: 100 INJECTION, SOLUTION SUBCUTANEOUS at 18:36

## 2020-06-01 RX ADMIN — SODIUM CHLORIDE, POTASSIUM CHLORIDE, SODIUM LACTATE AND CALCIUM CHLORIDE 500 ML: 600; 310; 30; 20 INJECTION, SOLUTION INTRAVENOUS at 09:55

## 2020-06-01 RX ADMIN — MEROPENEM 500 MG: 500 INJECTION, POWDER, FOR SOLUTION INTRAVENOUS at 18:36

## 2020-06-01 RX ADMIN — SODIUM CHLORIDE, POTASSIUM CHLORIDE, SODIUM LACTATE AND CALCIUM CHLORIDE 1000 ML: 600; 310; 30; 20 INJECTION, SOLUTION INTRAVENOUS at 13:59

## 2020-06-01 RX ADMIN — GABAPENTIN 200 MG: 100 CAPSULE ORAL at 18:37

## 2020-06-01 RX ADMIN — LACOSAMIDE 300 MG: 100 TABLET, FILM COATED ORAL at 04:35

## 2020-06-01 RX ADMIN — LACOSAMIDE 300 MG: 100 TABLET, FILM COATED ORAL at 18:37

## 2020-06-01 RX ADMIN — SODIUM CHLORIDE: 4.5 INJECTION, SOLUTION INTRAVENOUS at 03:32

## 2020-06-01 RX ADMIN — DEXAMETHASONE 2 MG: 0.5 ELIXIR ORAL at 04:35

## 2020-06-01 RX ADMIN — AMLODIPINE BESYLATE 5 MG: 5 TABLET ORAL at 04:35

## 2020-06-01 RX ADMIN — MEROPENEM 500 MG: 500 INJECTION, POWDER, FOR SOLUTION INTRAVENOUS at 04:35

## 2020-06-01 RX ADMIN — POTASSIUM BICARBONATE 50 MEQ: 978 TABLET, EFFERVESCENT ORAL at 04:35

## 2020-06-01 RX ADMIN — INSULIN GLARGINE 10 UNITS: 100 INJECTION, SOLUTION SUBCUTANEOUS at 04:39

## 2020-06-01 RX ADMIN — LEVETIRACETAM 1500 MG: 500 TABLET ORAL at 18:37

## 2020-06-01 RX ADMIN — TOPIRAMATE 200 MG: 100 TABLET, FILM COATED ORAL at 04:35

## 2020-06-01 RX ADMIN — Medication 1 CAPSULE: at 09:51

## 2020-06-01 RX ADMIN — ENOXAPARIN SODIUM 40 MG: 100 INJECTION SUBCUTANEOUS at 04:34

## 2020-06-01 RX ADMIN — LEVETIRACETAM 1500 MG: 500 TABLET ORAL at 04:35

## 2020-06-01 RX ADMIN — MEROPENEM 500 MG: 500 INJECTION, POWDER, FOR SOLUTION INTRAVENOUS at 12:23

## 2020-06-01 RX ADMIN — TOPIRAMATE 200 MG: 100 TABLET, FILM COATED ORAL at 18:37

## 2020-06-01 RX ADMIN — GABAPENTIN 200 MG: 100 CAPSULE ORAL at 09:51

## 2020-06-01 NOTE — ASSESSMENT & PLAN NOTE
Suspect related to the history of brain surgery, GBM  Developed hypernatremia  Discussed with Dr. Emerson, nephrology following: recommending DDAVP BID, continue monitoring Na levels

## 2020-06-01 NOTE — PROGRESS NOTES
Phone james with family. Pt still obtunded. Opened eyes and slightly moved hand but nothing else.     Lost IV access Paged Dr Pool. Received orders for one midline and one US guided. 2 different sites of access needed due to medication interactions

## 2020-06-01 NOTE — PROGRESS NOTES
Monitor summary: SR 63-90, [place] 0.16, QRS 0.10, QT 0.36, with occasional PVCs per strip from monitor room.

## 2020-06-01 NOTE — PROGRESS NOTES
Family called. Spoke with daughters and SO. Updated on POC, placed phone to pt's ear so that family could say winsome.     Family will call for zoom tomorrow, 6/1, at 1400.

## 2020-06-01 NOTE — CARE PLAN
Problem: Safety  Goal: Will remain free from falls  Outcome: PROGRESSING AS EXPECTED  Note: Bed in lowest position, Bed wheels locked, Bed alarm on, Call light within reach, Clutter free environment, treaded socks on.       Problem: Communication  Goal: The ability to communicate needs accurately and effectively will improve  Outcome: PROGRESSING SLOWER THAN EXPECTED  Note: Pt obtunded today. Hard to arouse. Pt groans occasionally but not often. Opened eyes at times but not fully and not for long. MD aware. Arouses more when family calls in.

## 2020-06-01 NOTE — CARE PLAN
Problem: Safety  Goal: Will remain free from injury  Outcome: PROGRESSING AS EXPECTED  Note: Continuous EEG monitoring, q2h turns, hourly checks. Pt. Obtunded, not responding at this time. Safety maintained      Problem: Skin Integrity  Goal: Risk for impaired skin integrity will decrease  Outcome: PROGRESSING AS EXPECTED  Note: Q2h turns, moisture-associated damage to scrotum and perianal - moisture barrier cream applied PRN.      Problem: Safety:  Goal: Will remain free from injury  Outcome: PROGRESSING AS EXPECTED  Note: Continuous EEG monitoring, q2h turns, hourly checks. Pt. Obtunded, not responding at this time. Safety maintained

## 2020-06-01 NOTE — THERAPY
Attempted to see pt for OT tx. Per RN, pt on continuous EEG for increased lethargy and seizures. Will try again later as appropriate.

## 2020-06-01 NOTE — PROGRESS NOTES
"Daily family Zoom call placed yesterday with patients three daughters and SO, Jerrica. Pt remains very lethargic today. Pt is unable to speak or open eyes. At this time, he is responding to painful stimuli.Used cool washcloth over face for stimulation and mouth swabs, but patient still unable to participate. His grandchildren sang to him and family was very encouraging to him. During the call I released pt hand and encouraged him to use it to wave to family and scratch his face, which he did attempt to wave when his grand kids were calling for him, \"Papa\".  EEG in process. Discussed POC for current EEG, fluids, voiding and monitoring labs, while awaiting results from current EEG tomorrow. Answered all family questions and concerns. Discussed with Dr Pool before and after call. Notified family that one support person can be here daily starting June 1st from [phone] and encouraged to bring ipad for family zoom call. They will decide who attends. Continue family daily zoom call through shift until June 1st, then reassess since family can be here.      "

## 2020-06-01 NOTE — DISCHARGE PLANNING
LSW received vm regarding insurance. Per vm, Deana from [facility] would like to speak to LSW regarding pt's transportation. Deana [phone]. If cannot speak to Deana, Aisha can be reached at [phone]. Case number is [id number].    LSW will f/u.

## 2020-06-01 NOTE — PROGRESS NOTES
Neurology Progress Note  Neurohospitalist Service, [facility]    Referring Physician: Liliana Pool M.D.    HPI: Refer to initial documented Neurology H&P, as detailed in the patient's chart.    Interval History 6/1/2020 Remains hooked up to VEEG.  No new complaints.  Had a few nonconvulsive seizures on VEEG overnight.    Review of systems: In addition to what is detailed in the HPI and/or updated in the interval history, all other systems reviewed and are negative. Mental status precludes a thorough ROS.    Past Medical History:    has a past medical history of GBM (glioblastoma multiforme) (HCC) and Hypertension.    FHx:  family history includes Heart Disease in his father and mother.    SHx:   reports that he has never smoked. He has never used smokeless tobacco. He reports previous alcohol use. He reports that he does not use drugs.    Medications:    Current Facility-Administered Medications:   •  gabapentin (NEURONTIN) capsule 200 mg, 200 mg, Enteral Tube, BID, Jc Carrasco M.D.  •  lactobacillus rhamnosus (CULTURELLE) capsule 1 Cap, 1 Cap, Enteral Tube, QDAY with Breakfast, Liliana Pool M.D.  •  1/2 NS infusion, , Intravenous, Continuous, Liliana Pool M.D., Last Rate: 125 mL/hr at 06/01/20 0332  •  insulin glargine (LANTUS) injection 10 Units, 10 Units, Subcutaneous, BID, 10 Units at 06/01/20 0439 **AND** insulin lispro (HUMALOG) injection 2-9 Units, 2-9 Units, Subcutaneous, Q6HRS, Stopped at 06/01/20 0000 **AND** POC Blood Glucose, , , Q6H **AND** NOTIFY MD and PharmD, , , Once **AND** glucose 4 g chewable tablet 16 g, 16 g, Enteral Tube, Q15 MIN PRN **AND** dextrose 50% (D50W) injection 50 mL, 50 mL, Intravenous, Q15 MIN PRN, Liliana Pool M.D.  •  meropenem (MERREM) 500 mg in  mL IVPB, 500 mg, Intravenous, Q6HRS, Liliana Pool M.D., Stopped at 06/01/20 0505  •  dexamethasone (DECADRON) 0.5 MG/5ML elixir 2 mg, 2 mg, Enteral Tube, DAILY, Ahmet Lopez M.D., 2 mg at  06/01/20 0435  •  potassium bicarbonate (KLYTE) effervescent tablet 50 mEq, 50 mEq, Enteral Tube, DAILY, Nik Jones M.D., 50 mEq at 06/01/20 0435  •  lacosamide (VIMPAT) tablet 300 mg, 300 mg, Enteral Tube, BID, Nik Jones M.D., 300 mg at 06/01/20 0435  •  levETIRAcetam (KEPPRA) tablet 1,500 mg, 1,500 mg, Enteral Tube, BID, Nik Jones M.D., 1,500 mg at 06/01/20 0435  •  amLODIPine (NORVASC) tablet 5 mg, 5 mg, Enteral Tube, Q DAY, Nik Jones M.D., 5 mg at 06/01/20 0435  •  enoxaparin (LOVENOX) inj 40 mg, 40 mg, Subcutaneous, DAILY, Colton Perez M.D., 40 mg at 06/01/20 0434  •  Pharmacy Consult: Enteral tube insertion - review meds/change route/product selection, 1 Each, Other, PHARMACY TO DOSE, Colton Perez M.D.  •  losartan (COZAAR) tablet 100 mg, 100 mg, Enteral Tube, DAILY, Colton Perez M.D., 100 mg at 05/31/20 1752  •  topiramate (TOPAMAX) tablet 200 mg, 200 mg, Enteral Tube, Q12HRS, Colton Perez M.D., 200 mg at 06/01/20 0435  •  acetaminophen (TYLENOL) tablet 650 mg, 650 mg, Enteral Tube, Q6HRS PRN, Colton Perez M.D., 650 mg at 05/20/20 1937  •  ondansetron (ZOFRAN ODT) dispertab 4 mg, 4 mg, Enteral Tube, Q4HRS PRN, Colton Perez M.D.  •  Respiratory Therapy Consult, , Nebulization, Continuous RT, Nikhil Erickson D.O.  •  ipratropium-albuterol (DUONEB) nebulizer solution, 3 mL, Nebulization, Q2HRS PRN (RT), Nikhil Erickson D.O.  •  ondansetron (ZOFRAN) syringe/vial injection 4 mg, 4 mg, Intravenous, Q4HRS PRN, Igor Carlos M.D.  •  LORazepam (ATIVAN) injection 4 mg, 4 mg, Intravenous, Q10 MIN PRN, Igor Carlos M.D., 4 mg at 05/15/20 1545  •  hydrALAZINE (APRESOLINE) injection 10 mg, 10 mg, Intravenous, Q6HRS PRN, Guido Pierre M.D., 10 mg at 05/30/20 2319    Physical Examination:     Vitals:    05/31/20 2000 06/01/20 0000 06/01/20 0400 06/01/20 0733   BP: 135/91 135/81 141/93 110/83   Pulse: 74 86 77 76   Resp: 15 17 16 (!) 22   Temp: 36.6 °C (97.8 °F) 36.4 °C (97.6 °F) 36.3 °C  (97.4 °F)    TempSrc: Temporal Temporal Temporal    SpO2: 95% 96% 95% (!) 86%   Weight:       Height:           General: Patient is lethargic  Eyes: examination of optic disks not indicated at this time  CV: RRR    NEUROLOGICAL EXAM:     Mental status: opens eyes only to noxious stimulus, does not follow commands  Speech and language: speech output limited, does not name or repeat  Cranial nerve exam: Pupils are equal, round and reactive to light bilaterally. Does not blink to threat on the right; blinks on the left. Corneal reflexes intact b/l.  Extraocular muscles are intact on VOR but does not track. Sensation in the face is unable to be tested due to clinical status. Face is notable for mild right nasolabial fold flattening. Tongue is midline.  Motor exam: does not participate in formal strength testing but moving the left arm spontaneously. Tone is flaccid on the right. No abnormal movements were seen on exam.  Sensory exam: withdraw from noxious stim on the left with no response on the right   Deep tendon reflexes:  1+ and symmetric. Toes down-going on the left and upgoing on the right.  Coordination: nonparticipatory  Gait: deferred due to mental status  Other: + palmomental reflex    Objective Data:    Labs:  Lab Results   Component Value Date/Time    PROTHROMBTM 12.0 05/13/2020 10:36 PM    INR 0.87 05/13/2020 10:36 PM      Lab Results   Component Value Date/Time    WBC 7.3 06/01/2020 05:34 AM    RBC 4.12 (L) 06/01/2020 05:34 AM    HEMOGLOBIN 13.2 (L) 06/01/2020 05:34 AM    HEMATOCRIT 38.6 (L) 06/01/2020 05:34 AM    MCV 93.7 06/01/2020 05:34 AM    MCH 32.0 06/01/2020 05:34 AM    MCHC 34.2 06/01/2020 05:34 AM    MPV 10.3 06/01/2020 05:34 AM    NEUTSPOLYS 72.20 (H) 06/01/2020 05:34 AM    LYMPHOCYTES 15.60 (L) 06/01/2020 05:34 AM    MONOCYTES 5.50 06/01/2020 05:34 AM    EOSINOPHILS 2.00 06/01/2020 05:34 AM    BASOPHILS 0.70 06/01/2020 05:34 AM    ANISOCYTOSIS 1+ 05/25/2020 12:15 AM      Lab Results   Component  Value Date/Time    SODIUM 140 06/01/2020 05:34 AM    POTASSIUM 4.6 06/01/2020 05:34 AM    CHLORIDE 107 06/01/2020 05:34 AM    CO2 21 06/01/2020 05:34 AM    GLUCOSE 148 (H) 06/01/2020 05:34 AM    BUN 20 06/01/2020 05:34 AM    CREATININE 0.64 06/01/2020 05:34 AM      Lab Results   Component Value Date/Time    TRIGLYCERIDE 480 (H) 05/20/2020 03:32 AM       Lab Results   Component Value Date/Time    ALKPHOSPHAT 46 06/01/2020 05:34 AM    ASTSGOT 42 06/01/2020 05:34 AM    ALTSGPT 110 (H) 06/01/2020 05:34 AM    TBILIRUBIN 0.2 06/01/2020 05:34 AM        Imaging/Testing:    I interpreted and/or reviewed the patient's neuroimaging    MR-BRAIN-WITH & W/O   Final Result      1.  Postsurgical changes left parietal craniotomy again noted. No significant change in thick walled rim enhancing left parietal mass/resection cavity and adjacent separate nodular focus of enhancement. Surrounding vasogenic edema and/or nonenhancing    tumor is also unchanged with stable mass effect.   2.  No acute infarct or new area of hemorrhage.   3.  No significant interval change.      DX-ABDOMEN FOR TUBE PLACEMENT   Final Result      Feeding tube tip overlies the upper gastric body.      DX-CHEST-LIMITED (1 VIEW)   Final Result      1.  Feeding tube tip located high in position at the level of the gastroesophageal junction. Advancement is recommended.      2.  Cardiomegaly.      This was discussed with the patient's ICU nurse at 8:22 AM on 5/27/2020.      DX-ABDOMEN FOR TUBE PLACEMENT   Final Result      Cortrak nasogastric tube position consistent with intragastric placement.      DX-CHEST-PORTABLE (1 VIEW)   Final Result      1.  No focal pulmonary consolidation.      2.  Extubation      DX-ABDOMEN FOR TUBE PLACEMENT   Final Result      Enteric tube tip projects over the stomach.      DX-CHEST-PORTABLE (1 VIEW)   Final Result      No significant change from prior exam.      DX-ABDOMEN FOR TUBE PLACEMENT   Final Result      Enteric tube tip  projects over the distal stomach.      DX-CHEST-PORTABLE (1 VIEW)   Final Result      No significant change from prior exam.      DX-ABDOMEN FOR TUBE PLACEMENT   Final Result      Enteric tube terminates over the stomach.      DX-CHEST-PORTABLE (1 VIEW)   Final Result      Increasing left basilar opacity could be from atelectasis or consolidation      DX-CHEST-PORTABLE (1 VIEW)   Final Result      Stable chest except findings with bibasilar atelectasis.      DX-CHEST-PORTABLE (1 VIEW)   Final Result      Stable chest x-ray findings.      DX-CHEST-PORTABLE (1 VIEW)   Final Result      Stable hypoventilatory chest with stable hazy left basilar opacity could be from atelectasis or consolidation favored over pleural fluid      DX-CHEST-PORTABLE (1 VIEW)   Final Result         1. No significant interval change.      US-DUPLEX TESTICLE COMPLETE (COMBO)   Final Result      Large bilateral hydroceles, right more than left.      Unremarkable bilateral testes.      DX-CHEST-PORTABLE (1 VIEW)   Final Result         1. No significant interval change.      MR-BRAIN-WITH & W/O   Final Result      1.  No acute hemorrhage or ischemia   2.  Two areas of enhancement in the LEFT parietal tumor resection bed which could be posttherapeutic change or indicative of residual or recurrent glioblastoma. Direct comparison with prior imaging would be helpful to further assess.   3.  3 mm of LEFT-to-RIGHT midline shift   4.  Atrophy   5.  White matter changes      DX-ABDOMEN FOR TUBE PLACEMENT   Final Result      Cortrak feeding tube tip projects in the region of the distal stomach.      DX-CHEST-LIMITED (1 VIEW)   Final Result      1.  New right subclavian vein central line tip projects in satisfactory position. No pneumothorax.      2.  Satisfactory position of endotracheal tube.      3.  Feeding tube is now present.      4.  No focal pulmonary consolidation.      DX-CHEST-PORTABLE (1 VIEW)   Final Result      1.  Interval placement of an  endotracheal tube which terminates in satisfactory position at the level of the aortic arch.   2.  Mild right basilar atelectasis and/or consolidation.      DX-CHEST-PORTABLE (1 VIEW)   Final Result      No radiographic evidence of acute cardiopulmonary process.      CT-CTA NECK WITH & W/O-POST PROCESSING   Final Result      CT angiogram of the neck within normal limits.      CT-CTA HEAD WITH & W/O-POST PROCESS   Final Result      Left parietal mass resection with some blood vessels along the  operative bed margins. These could indicate healing response although local recurrence is also possible. Comparison with prior studies and correlation with operative history may prove    helpful to clarify      No acute arterial occlusion is identified      CT-HEAD W/O   Final Result      No acute intracranial hemorrhage is identified.      Left parietal vertex craniotomy with underlying vasogenic edema and mass with associated mild rightward midline shift, moderate left lateral ventricular effacement        EEG as read by Dr. Billie nur of 6/1/20: abnormal 24 hrs video EEG recording in the awake, drowsy, and sleep states. A mild encephalopathy is suggested. There is slowing on the left cerebral hemisphere. Continuous sharps in the left posterior quadrant, intermittently becoming briefly periodic or rhythmic, with suggestion of a few brief focal left posterior quadrant seizures during the study. The findings suggest a large underlying area of cortical irritability and structural abnormality.    Assessment and Plan:    Ahmet Escobedo is a 60 y.o. man with history of glioblastoma multiforme, status post surgical resection, who was admitted 5/15/20 for evaluation of status epilepticus.  Overnight, 5/31/20, had a few non-convulsive electrographic seizures.    Plan:     - continue VEEG and seizure precautions  - continue Vimpat 300mg BID  - continue Keppra 1500mg BID   - continue Topamax 200mg BID  - add Gabapentin 200mg BID    The  evaluation of the patient, and recommended management, was discussed with the resident staff. I have performed a physical exam and reviewed and updated ROS and Plan today (6/1/2020). In review of yesterday's note (5/31/2020), there are no changes except as documented above.    Jc Carrasco MD  Director, [facility], [facility]  Neurohospitalist, [facility]  Clinical  of Neurology, [facility] [employer] [facility] [employer]  t) [phone] (f) [phone]

## 2020-06-01 NOTE — PROCEDURES
VIDEO ELECTROENCEPHALOGRAM REPORT        Referring provider: Dr. Zhao.      DOS: 6/1/2020 (total recording of 23 hours and 46 minutes).      INDICATION:  Ahmet Escobedo 60 y.o. male presenting with h/o brain tumor, seizures.      CURRENT ANTIEPILEPTIC REGIMEN: Levetiracetam 1500 mg q 12 hrs, Lacosamide 300 mg q 12 hrs, and Topiramate 200 mg q 12 hrs. Gabapentin 200 mg bid added during the study. No longer on Valproic Acid.      TECHNIQUE: 30 channel video electroencephalogram (EEG) was performed in accordance with the international 10-20 system. The study was reviewed in bipolar and referential montages. The recording examined a patient during wakeful, drowsy, and sleep states.      DESCRIPTION OF THE RECORD:  During the awake state, the background showed an asymmetric 7 hz theta activity on the right hemisphere. There is slowing on the left cerebral hemisphere. Continuous sharps in the left posterior quadrant, intermittently becoming briefly periodic or rhythmic, with suggestion of a few brief focal left posterior quadrant seizures during the study.      During the drowsy state, theta/delta activity was seen.      During the sleep state, higher amplitude delta activity noted.         ACTIVATION PROCEDURES:   Not performed.      EKG: sampling of the EKG recording demonstrated sinus rhythm.      EVENTS: None.      INTERPRETATION:  This is an abnormal 24 hrs video EEG recording in the awake, drowsy, and sleep states. A mild encephalopathy is suggested. There is slowing on the left cerebral hemisphere. Continuous sharps in the left posterior quadrant, intermittently becoming briefly periodic or rhythmic, with suggestion of a few brief non convulsive focal left posterior quadrant seizures during the study. No changes despite further adjustments in anti-seizure medications. 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 The findings suggest a large underlying area of cortical  irritability and structural abnormality. Clinical and radiological correlation is recommended.     Updates provided to Dr. Carrasco.         Davion Wise MD   Epilepsy and Neurodiagnostics.   Clinical  of Neurology [facility].   Diplomate in Neurology, Epilepsy, and Electrodiagnostic Medicine.   Office: [phone]  Fax: [phone]

## 2020-06-01 NOTE — THERAPY
Missed Therapy     Patient Name: Ahmet Escobedo  Age:  60 y.o., Sex:  male  Medical Record #: [id number]  Today's Date: 6/1/2020    Discussed missed therapy with RN       06/01/20 7006   Treatment Variance   Reason For Missed Therapy Medical - Patient Unarousable   Total Time Spent   Total Time Spent (Mins) 2   Interdisciplinary Plan of Care Collaboration   IDT Collaboration with  Nursing   Collaboration Comments This SLP attempted to see patient for dysphagia tx session. Per RN, hold tx for today, as patient is still having seizures and unarousable. SLP will hold and re-attempt later as able and appropriate. Thank you.

## 2020-06-01 NOTE — THERAPY
06/01/20 1107   Other Treatments   Other Treatments Provided Pt on hold today from PT today. Currently on 24 hr EEG monitor 2* seizures. PT will resume when medically cleared to participate.

## 2020-06-01 NOTE — DISCHARGE PLANNING
Agency/Facility Name: [facility]   Outcome: [facility] left voicemail with Jass in admissions for f/u.       [facility] received call back, per Jass insurance auth has not been completed yet, but facility would like to know timeline of possible d/c. [facility] will f/u with Jass after rounds.

## 2020-06-01 NOTE — PROGRESS NOTES
Monitor summary: SR 63-90, PA 0.18, QRS 0.10, QT 0.38, with occasional PVCs per strip from monitor room.

## 2020-06-01 NOTE — DISCHARGE PLANNING
LSW called Deana. Deana stated that a family member called stating that pt will be transferring to [place], Deana asked for clarification. ELMA stated that once dc planning starts, best recommendations from MD is that pt be placed in a SNF which the family did request for a SNF in [place]. Deana asked if pt will be transferred with ambulance or regular car. DINORAW stated that pt will most likely need ambulance. Deana asked for LSW to update on timeframe when pt is expected to dc in order for them start insurance authorization. LSW stated will call eDana tomorrow with answers.

## 2020-06-01 NOTE — PROGRESS NOTES
Hospital Medicine Daily Progress Note    Date of Service  6/1/2020    Chief Complaint  60 y.o. male admitted 5/13/2020 with seizure    Hospital Course  60 y.o. male admitted 5/13/2020 with a history of GBM and seizures.  Hospitalization complicated by status epilepticus, requiring ICU stay.    Interval Problem Update  Patient seen and evaluated on rounds  Discussed with nursing staff on rounds    Persistent somnolence, neurology team is titrating antiepileptic therapy    Persistent polyuria, discussed with nephrology, they will evaluate the patient, discussed with pharmacy to initiate DDAVP    Stop fluid hydration and monitor sodium levels    ESBL E. coli, continue meropenem, sensitive to fosfomycin    Plan of care discussed over the phone personally by me with daughter Linh, updated regarding plan of care, underlying medical concerns including treatment of ESBL E. coli, disposition planning, all questions and concerns answered personally by me.. 06/01/20    I discussed the case with hospitalist JERO Guerrero, daily video conferencing with family to continue on a daily basis.    Patient has testicular swelling, increased to the right side.  Noted finding of hydrocele.    Linh [phone] to be updated for any emergent concerns.  They would appreciate daily updates.      Palliative care on board    Consultants/Specialty  Neurology  Pulmonology/critical care  Palliative care    Code Status  Full code    Disposition  Will need aggressive PT, OT, SLP evaluations determine disposition planning once somnolence has improved.    Review of Systems  Review of Systems   Unable to perform ROS: Acuity of condition        Physical Exam  Temp:  [36.2 °C (97.1 °F)-36.6 °C (97.8 °F)] 36.3 °C (97.4 °F)  Pulse:  [74-86] 75  Resp:  [15-22] 20  BP: (104-141)/(60-93) 104/60  SpO2:  [86 %-97 %] 91 %    Physical Exam  Constitutional:       Appearance: He is obese. He is diaphoretic. He is not ill-appearing or toxic-appearing.      Comments:  Slightly diaphoretic, somnolent  Body mass index is 32.25 kg/m².   HENT:      Head: Normocephalic.      Nose: Nose normal.      Comments: Ng tube in place     Mouth/Throat:      Mouth: Mucous membranes are moist.   Eyes:      Conjunctiva/sclera: Conjunctivae normal.   Neck:      Musculoskeletal: Normal range of motion.   Cardiovascular:      Rate and Rhythm: Normal rate.      Pulses: Normal pulses.      Heart sounds: Normal heart sounds. No murmur.   Pulmonary:      Effort: Pulmonary effort is normal.      Breath sounds: Normal breath sounds. No rhonchi or rales.      Comments: Diminished in bases  Chest:      Chest wall: No tenderness.   Abdominal:      General: Abdomen is flat. Bowel sounds are normal. There is no distension.      Palpations: Abdomen is soft.      Tenderness: There is no abdominal tenderness. There is no right CVA tenderness, left CVA tenderness, guarding or rebound.   Genitourinary:     Comments: Patient has testicular swelling, increased to the right side  Musculoskeletal:         General: No swelling or deformity.      Right lower leg: No edema.      Left lower leg: No edema.   Skin:     General: Skin is warm.   Neurological:      Comments: Somnolent   Inappropriate words, at times spontaneous eye opening, withdraws from pain   Psychiatric:      Comments: Somnolent.         Fluids    Intake/Output Summary (Last 24 hours) at 6/1/2020 1521  Last data filed at 6/1/2020 0800  Gross per 24 hour   Intake 4595.83 ml   Output 3885 ml   Net 710.83 ml       Laboratory  Recent Labs     05/30/20 0314 05/31/20  0439 06/01/20  0534   WBC 6.6 7.3 7.3   RBC 4.06* 3.97* 4.12*   HEMOGLOBIN 12.8* 12.4* 13.2*   HEMATOCRIT 39.7* 37.7* 38.6*   MCV 97.8 95.0 93.7   MCH 31.5 31.2 32.0   MCHC 32.2* 32.9* 34.2   RDW 51.7* 48.9 46.9   PLATELETCT 183 189 197   MPV 10.8 10.5 10.3     Recent Labs     05/30/20 0314 05/31/20  0439 06/01/20  0534   SODIUM 150* 144 140   POTASSIUM 3.7 3.6 4.6   CHLORIDE 117* 113* 107   CO2 21  20 21   GLUCOSE 158* 176* 148*   BUN 27* 22 20   CREATININE 0.71 0.65 0.64   CALCIUM 8.9 9.2 8.7                   Imaging       Assessment/Plan  * Encephalopathy acute- (present on admission)  Assessment & Plan  I discussed with the family 05/27/20  Patient independent relatively with ADLs, IADLs prior to presentation    Intermittent, waxing and waning GCS, remains intermittently persistent    Etiologies considered include: Polypharmacy/pharmacological/iatrogenic, recurrent seizures, infectious (UTI), /others.    Scopolamine patch which can be sedated - stopped 05/27/2020     I have discussed with Dr. Zhao from neurology, he agrees that the AED therapy could be sedated but recommends continuing this given high risk of development of status epilepticus if patient is weaned off this.  He reports patient may develop tolerance to this with time.  05/30/20 discussed again with Dr Zhao from neurology, given worsening ammonia levels and now developing transaminitis my concern is that if valproic acid could be contributing to worsening encephalopathy secondary to hyperammonemia and now leading to transaminitis. Dr Zhao agreed, from his perspective valproic acid not be adding much to the therapy anyways given he is on strong and potent 3 other antiepileptic drugs, valproic acid could be safely stopped at this time.  Patient did receive a dose this morning, will stop valproic acid per neurology recommendations and hopefully repeat an EEG over the course of next 24 to 48 hours.  On May 31, 2020, patient is more somnolent.  Discussed and evaluated the patient with Dr Zhao, from neurology and decision to initiate 24-hour continuous EEG again.  This showed subclinical seizures, none persistent seizures.  Neurology team has added gabapentin therapy now to the regimen on June 1, 2020.    Continue management of UTI with IV meropenem    Neurology team is evaluating the patient and following and available as  needed    Maintain fluid hydration, avoid development of metabolic issues including electrolyte abnormalities.  Closely monitor sodium levels.  I have requested nephrology team evaluation for consideration of initiation of DDAVP.    TSH within normal limit, B12, folate normal    Maintain strict aspiration, fall and seizure precautions.    Polyuria- (present on admission)  Assessment & Plan  Suspect related to the history of brain surgery, GBM  Developed hypernatremia  Discussed with Dr. Dejesus, nephrology she will evaluate the patient  Stop IV fluid hydration, recommends initiation of lowest dose of desmopressin  Above discussed by me with Louise from pharmacy  Dr. Emerson to evaluate the patient  Monitor daily sodium levels    Acute cystitis without hematuria  Assessment & Plan  ESBL E. coli   Continue meropenem IV  Sensitive to fosfomycin  May consider fosfomycin over the coming days    Status epilepticus (HCC)- (present on admission)  Assessment & Plan  Patient on 4 drug antiepileptic drug therapy including topiramate, Vimpat, Keppra, valproate  Discussed with neurology, valproic acid stopped May 30, 2020 given hyperammonemia and transaminitis  Initiated on gabapentin on June 1, 2020 by neurology team  Neurology team is following, neurology team to titrate antiepileptic drug therapy as clinically appropriate  Maintain seizure precautions  Continue close clinical monitoring    GBM (glioblastoma multiforme) (HCC)- (present on admission)  Assessment & Plan  s/p Left parietal craniotomy with subtotal resection. 2/26/2020  Followed by [facility]  History of radiation therapy  Previous MRI May 17, 2020 without any acute concerns, will need comparison for new findings compared to prior MRI.  Given persistent somnolence, interval MRI requested 05/27/20  Neurology team is following  Patient is on Decadron, this will need to be tapered  Will need close outpatient neurology, oncology, radiation oncology and neurosurgery  follow-up    Acute respiratory failure with hypoxia (HCC)- (present on admission)  Assessment & Plan  Stable now  Patient has intermittent episode of hypoxemia concerning for obstructive sleep apnea, could be potentially central in etiology too  Continue close pulse oximetry and oxygen support as needed    Type 2 diabetes mellitus with hyperglycemia, without long-term current use of insulin (HCC)- (present on admission)  Assessment & Plan  A1C 7.5   Likely steroid induced   Continue basal insulin, Lantus 12 units twice daily  Sliding scale insulin No. 2  Will titrate to achieve normal glycemic control  Hypoglycemic protocol in place    Bandemia- (present on admission)  Assessment & Plan  Likely secondary to steroids, contributory from underlying acute cystitis  IV meropenem    Hypokalemia- (present on admission)  Assessment & Plan  Patient remains on enteral replacement, continue and monitor daily potassium levels    Essential hypertension- (present on admission)  Assessment & Plan  Continue losartan and amlodipine  Titrate to achieve normotensive control       VTE prophylaxis: SC Lovenox

## 2020-06-01 NOTE — PROCEDURES
Vascular Access Team    Date of Insertion: 6/1/20  Arm Circumference: n/a  Line Length: 10  Line Size: 18  Vein Occupancy %: 41  Reason for Midline: access  Labs: WBC 7.3, , BUN 20, Cr 0.64, GFR >60, INR 0.87    Orders confirmed, vessel patency confirmed with ultrasound. Risks and benefits of procedure explained to patient and education regarding line associated bloodstream infections provided. Questions answered.     PowerGlide Midline placed in LUE per licensed provider order with ultrasound guidance. 18g, 10 cm line placed in cephalic vein after 01 attempt(s).  Catheter inserted with brisk blood return. Secured with 0cm external from insertion site.  Line flushed without resistance with 10 mL 0.9% normal saline.  Midline secured with Biopatch and Tegaderm.     Midline placement is confirmed by nurse using ultrasound and ability to flush and draw blood. Midline is appropriate for use at this time.  No X-ray is needed for placement confirmation. Pt tolerated procedure well.  Patient condition relayed to unit RN or ordering physician via this post procedure note in the EMR.    Ultrasound images uploaded to PACS and viewable in the EMR - yes  Ultrasound imaged printed and placed in paper chart - no      BARD PowerGlide Midline ref # [id number], Lot # [id number], Expiration Date01/31/21

## 2020-06-02 LAB
GLUCOSE BLD-MCNC: 125 MG/DL (ref 65–99)
GLUCOSE BLD-MCNC: 131 MG/DL (ref 65–99)
GLUCOSE BLD-MCNC: 136 MG/DL (ref 65–99)
GLUCOSE BLD-MCNC: 164 MG/DL (ref 65–99)
SODIUM SERPL-SCNC: 141 MMOL/L (ref 135–145)

## 2020-06-02 PROCEDURE — 99233 SBSQ HOSP IP/OBS HIGH 50: CPT | Performed by: PSYCHIATRY & NEUROLOGY

## 2020-06-02 PROCEDURE — 700102 HCHG RX REV CODE 250 W/ 637 OVERRIDE(OP): Performed by: INTERNAL MEDICINE

## 2020-06-02 PROCEDURE — A9270 NON-COVERED ITEM OR SERVICE: HCPCS | Performed by: PSYCHIATRY & NEUROLOGY

## 2020-06-02 PROCEDURE — 99233 SBSQ HOSP IP/OBS HIGH 50: CPT | Performed by: INTERNAL MEDICINE

## 2020-06-02 PROCEDURE — 770020 HCHG ROOM/CARE - TELE (206)

## 2020-06-02 PROCEDURE — 99254 IP/OBS CNSLTJ NEW/EST MOD 60: CPT | Performed by: INTERNAL MEDICINE

## 2020-06-02 PROCEDURE — 82962 GLUCOSE BLOOD TEST: CPT | Mod: 91

## 2020-06-02 PROCEDURE — 700111 HCHG RX REV CODE 636 W/ 250 OVERRIDE (IP): Performed by: INTERNAL MEDICINE

## 2020-06-02 PROCEDURE — 700102 HCHG RX REV CODE 250 W/ 637 OVERRIDE(OP): Performed by: PSYCHIATRY & NEUROLOGY

## 2020-06-02 PROCEDURE — A9270 NON-COVERED ITEM OR SERVICE: HCPCS | Performed by: INTERNAL MEDICINE

## 2020-06-02 PROCEDURE — 36415 COLL VENOUS BLD VENIPUNCTURE: CPT

## 2020-06-02 PROCEDURE — 700101 HCHG RX REV CODE 250: Performed by: INTERNAL MEDICINE

## 2020-06-02 PROCEDURE — 84295 ASSAY OF SERUM SODIUM: CPT

## 2020-06-02 PROCEDURE — 700105 HCHG RX REV CODE 258: Performed by: INTERNAL MEDICINE

## 2020-06-02 RX ORDER — GABAPENTIN 100 MG/1
100 CAPSULE ORAL ONCE
Status: COMPLETED | OUTPATIENT
Start: 2020-06-02 | End: 2020-06-02

## 2020-06-02 RX ORDER — DESMOPRESSIN ACETATE 0.1 MG/1
50 TABLET ORAL ONCE
Status: COMPLETED | OUTPATIENT
Start: 2020-06-02 | End: 2020-06-02

## 2020-06-02 RX ORDER — MODAFINIL 100 MG/1
100 TABLET ORAL EVERY MORNING
Status: DISCONTINUED | OUTPATIENT
Start: 2020-06-03 | End: 2020-06-03

## 2020-06-02 RX ORDER — SODIUM CHLORIDE 9 MG/ML
INJECTION, SOLUTION INTRAVENOUS
Status: COMPLETED
Start: 2020-06-02 | End: 2020-06-03

## 2020-06-02 RX ORDER — GABAPENTIN 300 MG/1
300 CAPSULE ORAL 2 TIMES DAILY
Status: DISCONTINUED | OUTPATIENT
Start: 2020-06-02 | End: 2020-06-05 | Stop reason: HOSPADM

## 2020-06-02 RX ADMIN — INSULIN GLARGINE 12 UNITS: 100 INJECTION, SOLUTION SUBCUTANEOUS at 17:50

## 2020-06-02 RX ADMIN — MEROPENEM 500 MG: 500 INJECTION, POWDER, FOR SOLUTION INTRAVENOUS at 00:04

## 2020-06-02 RX ADMIN — GABAPENTIN 100 MG: 100 CAPSULE ORAL at 08:59

## 2020-06-02 RX ADMIN — DEXAMETHASONE 2 MG: 1 TABLET ORAL at 04:33

## 2020-06-02 RX ADMIN — LEVETIRACETAM 1500 MG: 500 TABLET ORAL at 04:39

## 2020-06-02 RX ADMIN — MEROPENEM 500 MG: 500 INJECTION, POWDER, FOR SOLUTION INTRAVENOUS at 23:56

## 2020-06-02 RX ADMIN — GABAPENTIN 200 MG: 100 CAPSULE ORAL at 04:33

## 2020-06-02 RX ADMIN — MEROPENEM 500 MG: 500 INJECTION, POWDER, FOR SOLUTION INTRAVENOUS at 12:07

## 2020-06-02 RX ADMIN — Medication 1 CAPSULE: at 08:59

## 2020-06-02 RX ADMIN — MEROPENEM 500 MG: 500 INJECTION, POWDER, FOR SOLUTION INTRAVENOUS at 05:40

## 2020-06-02 RX ADMIN — LEVETIRACETAM 1500 MG: 500 TABLET ORAL at 17:48

## 2020-06-02 RX ADMIN — INSULIN GLARGINE 12 UNITS: 100 INJECTION, SOLUTION SUBCUTANEOUS at 05:42

## 2020-06-02 RX ADMIN — MEROPENEM 500 MG: 500 INJECTION, POWDER, FOR SOLUTION INTRAVENOUS at 17:48

## 2020-06-02 RX ADMIN — TOPIRAMATE 200 MG: 100 TABLET, FILM COATED ORAL at 17:58

## 2020-06-02 RX ADMIN — LOSARTAN POTASSIUM 100 MG: 50 TABLET, FILM COATED ORAL at 17:47

## 2020-06-02 RX ADMIN — TOPIRAMATE 200 MG: 100 TABLET, FILM COATED ORAL at 04:33

## 2020-06-02 RX ADMIN — GABAPENTIN 300 MG: 300 CAPSULE ORAL at 17:47

## 2020-06-02 RX ADMIN — LACOSAMIDE 300 MG: 100 TABLET, FILM COATED ORAL at 04:33

## 2020-06-02 RX ADMIN — POTASSIUM BICARBONATE 50 MEQ: 978 TABLET, EFFERVESCENT ORAL at 04:34

## 2020-06-02 RX ADMIN — ENOXAPARIN SODIUM 40 MG: 100 INJECTION SUBCUTANEOUS at 04:34

## 2020-06-02 RX ADMIN — DESMOPRESSIN ACETATE 50 MCG: 0.1 TABLET ORAL at 12:07

## 2020-06-02 RX ADMIN — LACOSAMIDE 300 MG: 100 TABLET, FILM COATED ORAL at 17:47

## 2020-06-02 RX ADMIN — AMLODIPINE BESYLATE 5 MG: 5 TABLET ORAL at 04:33

## 2020-06-02 NOTE — PROGRESS NOTES
Neurology Progress Note  Neurohospitalist Service, [facility]    Referring Physician: Brunilda Mclaughlin M.D.    HPI: Refer to initial documented Neurology H&P, as detailed in the patient's chart.    Interval History 6/2/2020 Overnight, EEG was mostly unchanged, continuing to show brief subclinical electrographic seizures on left posterior region.    Review of systems: In addition to what is detailed in the HPI and/or updated in the interval history, all other systems reviewed and are negative.  Obtunded and unable to provide further details.    Past Medical History:    has a past medical history of GBM (glioblastoma multiforme) (HCC) and Hypertension.    FHx:  family history includes Heart Disease in his father and mother.    SHx:   reports that he has never smoked. He has never used smokeless tobacco. He reports previous alcohol use. He reports that he does not use drugs.    Medications:    Current Facility-Administered Medications:   •  gabapentin (NEURONTIN) capsule 200 mg, 200 mg, Enteral Tube, BID, Jc Carrasco M.D., 200 mg at 06/02/20 0433  •  Pharmacy Consult Request, , Other, PHARMACY TO DOSE, Liliana Pool M.D.  •  insulin glargine (LANTUS) injection 12 Units, 12 Units, Subcutaneous, BID, 12 Units at 06/02/20 0542 **AND** insulin lispro (HUMALOG) injection 2-9 Units, 2-9 Units, Subcutaneous, Q6HRS, Stopped at 06/01/20 1800 **AND** POC Blood Glucose, , , Q6H **AND** NOTIFY MD and PharmD, , , Once **AND** glucose 4 g chewable tablet 16 g, 16 g, Enteral Tube, Q15 MIN PRN **AND** dextrose 50% (D50W) injection 50 mL, 50 mL, Intravenous, Q15 MIN PRN, Liliana Pool M.D.  •  dexamethasone (DECADRON) tablet 2 mg, 2 mg, Enteral Tube, DAILY, Liliana Pool M.D., 2 mg at 06/02/20 0433  •  lactobacillus rhamnosus (CULTURELLE) capsule 1 Cap, 1 Cap, Enteral Tube, QDAY with Breakfast, Liliana Pool M.D., 1 Cap at 06/01/20 0951  •  meropenem (MERREM) 500 mg in  mL IVPB, 500 mg, Intravenous,  Q6HRS, Liliana Pool M.D., Stopped at 06/02/20 0610  •  potassium bicarbonate (KLYTE) effervescent tablet 50 mEq, 50 mEq, Enteral Tube, DAILY, Nik Jones M.D., 50 mEq at 06/02/20 0434  •  lacosamide (VIMPAT) tablet 300 mg, 300 mg, Enteral Tube, BID, Nik Jones M.D., 300 mg at 06/02/20 0433  •  levETIRAcetam (KEPPRA) tablet 1,500 mg, 1,500 mg, Enteral Tube, BID, Nik Jones M.D., 1,500 mg at 06/02/20 0439  •  amLODIPine (NORVASC) tablet 5 mg, 5 mg, Enteral Tube, Q DAY, Nik Jones M.D., 5 mg at 06/02/20 0433  •  enoxaparin (LOVENOX) inj 40 mg, 40 mg, Subcutaneous, DAILY, Colton Perez M.D., 40 mg at 06/02/20 0434  •  Pharmacy Consult: Enteral tube insertion - review meds/change route/product selection, 1 Each, Other, PHARMACY TO DOSE, Colton Perez M.D.  •  losartan (COZAAR) tablet 100 mg, 100 mg, Enteral Tube, DAILY, Colton Perez M.D., 100 mg at 06/01/20 1836  •  topiramate (TOPAMAX) tablet 200 mg, 200 mg, Enteral Tube, Q12HRS, Colton Perez M.D., 200 mg at 06/02/20 0433  •  acetaminophen (TYLENOL) tablet 650 mg, 650 mg, Enteral Tube, Q6HRS PRN, Colton Perez M.D., 650 mg at 05/20/20 1937  •  ondansetron (ZOFRAN ODT) dispertab 4 mg, 4 mg, Enteral Tube, Q4HRS PRN, Colton Perez M.D.  •  Respiratory Therapy Consult, , Nebulization, Continuous RT, BRADY Reddy.O.  •  ipratropium-albuterol (DUONEB) nebulizer solution, 3 mL, Nebulization, Q2HRS PRN (RT), Nikhil Erickson D.O.  •  ondansetron (ZOFRAN) syringe/vial injection 4 mg, 4 mg, Intravenous, Q4HRS PRN, Igor Carlos M.D.  •  LORazepam (ATIVAN) injection 4 mg, 4 mg, Intravenous, Q10 MIN PRN, Igor Carlos M.D., 4 mg at 05/15/20 1545  •  hydrALAZINE (APRESOLINE) injection 10 mg, 10 mg, Intravenous, Q6HRS PRN, Guido Pierre M.D., 10 mg at 05/30/20 2319    Physical Examination:     Vitals:    06/01/20 1600 06/01/20 2032 06/02/20 0000 06/02/20 0432   BP: 115/82 111/76 100/53 127/81   Pulse: 61 89 68 98   Resp: (!) 23 20 16 20   Temp:  36 °C  (96.8 °F) 36.2 °C (97.1 °F) 36.8 °C (98.2 °F)   TempSrc:  Tympanic Temporal Temporal   SpO2: 92% 96% 97% 95%   Weight:       Height:           General: Patient is lethargic  Eyes: examination of optic disks not indicated at this time  CV: RRR    NEUROLOGICAL EXAM:     Mental status: does not open eyes to noxious stimulus, does not follow simple commands  Speech and language: does not name or repeat, output limited  Cranial nerve exam: Pupils are equal, round and reactive to light bilaterally. Does not blink to threat on the right; blinks to threat on the left. Corneal reflex intact b/l.  left gaze preference; VOR intact. Sensation in the face is unable to be tested due to clinical status. Face is notable for mild right nasolabial fold flattening. Tongue is midline.  Motor exam: does not participate in formal strength testing but moving the left arm spontaneously. Tone is flaccid on the right. No abnormal movements were seen on exam.  Sensory exam: withdraw from noxious stim on the left with no response to noxious stimulus on the right   Deep tendon reflexes:  1+ and symmetric. Toes down-going on the left and upgoing on the right  Coordination: nonparticipatory in formal testing  Gait: deferred due to refusal    Objective Data:    Labs:  Lab Results   Component Value Date/Time    PROTHROMBTM 12.0 05/13/2020 10:36 PM    INR 0.87 05/13/2020 10:36 PM      Lab Results   Component Value Date/Time    WBC 7.3 06/01/2020 05:34 AM    RBC 4.12 (L) 06/01/2020 05:34 AM    HEMOGLOBIN 13.2 (L) 06/01/2020 05:34 AM    HEMATOCRIT 38.6 (L) 06/01/2020 05:34 AM    MCV 93.7 06/01/2020 05:34 AM    MCH 32.0 06/01/2020 05:34 AM    MCHC 34.2 06/01/2020 05:34 AM    MPV 10.3 06/01/2020 05:34 AM    NEUTSPOLYS 72.20 (H) 06/01/2020 05:34 AM    LYMPHOCYTES 15.60 (L) 06/01/2020 05:34 AM    MONOCYTES 5.50 06/01/2020 05:34 AM    EOSINOPHILS 2.00 06/01/2020 05:34 AM    BASOPHILS 0.70 06/01/2020 05:34 AM    ANISOCYTOSIS 1+ 05/25/2020 12:15 AM      Lab  Results   Component Value Date/Time    SODIUM 140 06/01/2020 05:34 AM    POTASSIUM 4.6 06/01/2020 05:34 AM    CHLORIDE 107 06/01/2020 05:34 AM    CO2 21 06/01/2020 05:34 AM    GLUCOSE 148 (H) 06/01/2020 05:34 AM    BUN 20 06/01/2020 05:34 AM    CREATININE 0.64 06/01/2020 05:34 AM      Lab Results   Component Value Date/Time    TRIGLYCERIDE 480 (H) 05/20/2020 03:32 AM       Lab Results   Component Value Date/Time    ALKPHOSPHAT 46 06/01/2020 05:34 AM    ASTSGOT 42 06/01/2020 05:34 AM    ALTSGPT 110 (H) 06/01/2020 05:34 AM    TBILIRUBIN 0.2 06/01/2020 05:34 AM        Imaging/Testing:    I interpreted and/or reviewed the patient's neuroimaging    IR-US GUIDED PIV   Final Result    Ultrasound-guided PERIPHERAL IV INSERTION performed by    qualified nursing staff as above.      IR-MIDLINE CATHETER INSERTION WO GUIDANCE > AGE 3   Final Result                  Ultrasound-guided midline placement performed by qualified nursing staff    as above.          MR-BRAIN-WITH & W/O   Final Result      1.  Postsurgical changes left parietal craniotomy again noted. No significant change in thick walled rim enhancing left parietal mass/resection cavity and adjacent separate nodular focus of enhancement. Surrounding vasogenic edema and/or nonenhancing    tumor is also unchanged with stable mass effect.   2.  No acute infarct or new area of hemorrhage.   3.  No significant interval change.      DX-ABDOMEN FOR TUBE PLACEMENT   Final Result      Feeding tube tip overlies the upper gastric body.      DX-CHEST-LIMITED (1 VIEW)   Final Result      1.  Feeding tube tip located high in position at the level of the gastroesophageal junction. Advancement is recommended.      2.  Cardiomegaly.      This was discussed with the patient's ICU nurse at 8:22 AM on 5/27/2020.      DX-ABDOMEN FOR TUBE PLACEMENT   Final Result      Cortrak nasogastric tube position consistent with intragastric placement.      DX-CHEST-PORTABLE (1 VIEW)   Final Result       1.  No focal pulmonary consolidation.      2.  Extubation      DX-ABDOMEN FOR TUBE PLACEMENT   Final Result      Enteric tube tip projects over the stomach.      DX-CHEST-PORTABLE (1 VIEW)   Final Result      No significant change from prior exam.      DX-ABDOMEN FOR TUBE PLACEMENT   Final Result      Enteric tube tip projects over the distal stomach.      DX-CHEST-PORTABLE (1 VIEW)   Final Result      No significant change from prior exam.      DX-ABDOMEN FOR TUBE PLACEMENT   Final Result      Enteric tube terminates over the stomach.      DX-CHEST-PORTABLE (1 VIEW)   Final Result      Increasing left basilar opacity could be from atelectasis or consolidation      DX-CHEST-PORTABLE (1 VIEW)   Final Result      Stable chest except findings with bibasilar atelectasis.      DX-CHEST-PORTABLE (1 VIEW)   Final Result      Stable chest x-ray findings.      DX-CHEST-PORTABLE (1 VIEW)   Final Result      Stable hypoventilatory chest with stable hazy left basilar opacity could be from atelectasis or consolidation favored over pleural fluid      DX-CHEST-PORTABLE (1 VIEW)   Final Result         1. No significant interval change.      US-DUPLEX TESTICLE COMPLETE (COMBO)   Final Result      Large bilateral hydroceles, right more than left.      Unremarkable bilateral testes.      DX-CHEST-PORTABLE (1 VIEW)   Final Result         1. No significant interval change.      MR-BRAIN-WITH & W/O   Final Result      1.  No acute hemorrhage or ischemia   2.  Two areas of enhancement in the LEFT parietal tumor resection bed which could be posttherapeutic change or indicative of residual or recurrent glioblastoma. Direct comparison with prior imaging would be helpful to further assess.   3.  3 mm of LEFT-to-RIGHT midline shift   4.  Atrophy   5.  White matter changes      DX-ABDOMEN FOR TUBE PLACEMENT   Final Result      Cortrak feeding tube tip projects in the region of the distal stomach.      DX-CHEST-LIMITED (1 VIEW)   Final Result       1.  New right subclavian vein central line tip projects in satisfactory position. No pneumothorax.      2.  Satisfactory position of endotracheal tube.      3.  Feeding tube is now present.      4.  No focal pulmonary consolidation.      DX-CHEST-PORTABLE (1 VIEW)   Final Result      1.  Interval placement of an endotracheal tube which terminates in satisfactory position at the level of the aortic arch.   2.  Mild right basilar atelectasis and/or consolidation.      DX-CHEST-PORTABLE (1 VIEW)   Final Result      No radiographic evidence of acute cardiopulmonary process.      CT-CTA NECK WITH & W/O-POST PROCESSING   Final Result      CT angiogram of the neck within normal limits.      CT-CTA HEAD WITH & W/O-POST PROCESS   Final Result      Left parietal mass resection with some blood vessels along the  operative bed margins. These could indicate healing response although local recurrence is also possible. Comparison with prior studies and correlation with operative history may prove    helpful to clarify      No acute arterial occlusion is identified      CT-HEAD W/O   Final Result      No acute intracranial hemorrhage is identified.      Left parietal vertex craniotomy with underlying vasogenic edema and mass with associated mild rightward midline shift, moderate left lateral ventricular effacement          Assessment and Plan:    Ahmet Escobedo is a 60 y.o. man with history of glioblastoma multiforme, status post surgical resection, who was admitted 5/15/20 for evaluation of status epilepticus.  Overnight, 5/31/20 and 6/1/20, had a few non-convulsive electrographic seizures.  It is unlikely that further titration or addition of another AED will provide any further benefit.  Primary team to consider other surgical options including targeted radiotherapy to the underlying mass     Plan:      - disconnect VEEG; seizure precautions  - continue Vimpat 300mg BID  - continue Keppra 1500mg BID   - continue Topamax  200mg BID  - increase Gabapentin to 400mg BID  - determine if a different Abx other than meropenem can be used as this medication can exacerbate seizures  - start modafinil 100mg qAM, first dose scheduled for 6/3/20  - palliative care consult for GOC discussion    1400 addendum - family meeting via Zoom call with 1 daughter present; additional note in EMR    The evaluation of the patient, and recommended management, was discussed with the resident staff. I have performed a physical exam and reviewed and updated ROS and Plan today (6/2/2020). In review of yesterday's note (6/1/2020), there are no changes except as documented above.    Jc Carrasco MD  Director, Comprehensive Stroke Center, [facility]  Neurohospitalist, [facility]  Clinical  of Neurology, [employer]  t) [phone] (f) [phone]

## 2020-06-02 NOTE — DISCHARGE PLANNING
LSW met with dtr in pt's room. LSw provided SNF list. LSW reassured that family can take their time and LSW is available for questions.

## 2020-06-02 NOTE — PALLIATIVE CARE
Palliative Care follow-up  PC RN received update from patient's bedside RN requesting that PC RN attend family meeting tomorrow at 2pm with pt's family and MD. PC RN will meet at bedside at 2pm.        Thank you for allowing Palliative Care to support this patient and family. Contact [phone] for additional assistance, change in patient status, or with any questions/concerns.

## 2020-06-02 NOTE — CARE PLAN
Problem: Safety  Goal: Will remain free from injury  Outcome: PROGRESSING AS EXPECTED  Intervention: Implement seizure precautions  Flowsheets (Taken 6/2/2020 0900)  Precautions in Place:   Three siderails up   Cushioned/padded side rails   Bed in low position   Low stimuli environment   Suction on, within reach   O2 on, within reach  Note: Precautions in place     Problem: Safety - Medical Restraint  Goal: Remains free of injury from restraints (Restraint for Interference with Medical Device)  Description: INTERVENTIONS:  1. Determine that other, less restrictive measures have been tried or would not be effective before applying the restraint  2. Evaluate the patient's condition at the time of restraint application  3. Inform patient/family regarding the reason for restraint  4. Q2H: Monitor safety, psychosocial status, comfort, nutrition and hydration  Outcome: PROGRESSING AS EXPECTED  Flowsheets (Taken 6/2/2020 9904)  Addressed this shift: Remains free of injury from restraints (restraint for interference with medical device):   Determine that other, less restrictive measures have been tried or would not be effective before applying the restraint   Evaluate the patient's condition at the time of restraint application   Inform patient/family regarding the reason for restraint   Every 2 hours: Monitor safety, psychosocial status, comfort, nutrition and hydration  Note: Left wrist in restraint r/t pulling lines/ tubes.

## 2020-06-02 NOTE — PROGRESS NOTES
Monitor summary: SR 69-86, LA 0.20, QRS 0.10, QT 0.36, with rare PAC, per strip from monitor room.

## 2020-06-02 NOTE — DISCHARGE PLANNING
Agency/Facility Name: [facility]   Spoke To: Jass   Outcome: Per Jass he did receive more information from pts insurance regarding SNF coverage. Insurance has a 60% co-pay and an out of network detectable of $6,500 that has not been met at this time. Jass states pt insurance is a [employer] plan and it may be best to find an in network option to save the 60% co-pay, Jass offers that a facility called [facility] may take pts insurance. CCA will inform LSW JOSE Nino.

## 2020-06-03 LAB
ANION GAP SERPL CALC-SCNC: 12 MMOL/L (ref 7–16)
BUN SERPL-MCNC: 24 MG/DL (ref 8–22)
CALCIUM SERPL-MCNC: 9 MG/DL (ref 8.5–10.5)
CHLORIDE SERPL-SCNC: 106 MMOL/L (ref 96–112)
CO2 SERPL-SCNC: 23 MMOL/L (ref 20–33)
CREAT SERPL-MCNC: 0.79 MG/DL (ref 0.5–1.4)
GLUCOSE BLD-MCNC: 126 MG/DL (ref 65–99)
GLUCOSE BLD-MCNC: 138 MG/DL (ref 65–99)
GLUCOSE BLD-MCNC: 154 MG/DL (ref 65–99)
GLUCOSE BLD-MCNC: 168 MG/DL (ref 65–99)
GLUCOSE SERPL-MCNC: 132 MG/DL (ref 65–99)
POTASSIUM SERPL-SCNC: 3.9 MMOL/L (ref 3.6–5.5)
SODIUM SERPL-SCNC: 136 MMOL/L (ref 135–145)
SODIUM SERPL-SCNC: 141 MMOL/L (ref 135–145)

## 2020-06-03 PROCEDURE — 36415 COLL VENOUS BLD VENIPUNCTURE: CPT

## 2020-06-03 PROCEDURE — A9270 NON-COVERED ITEM OR SERVICE: HCPCS | Performed by: PSYCHIATRY & NEUROLOGY

## 2020-06-03 PROCEDURE — A9270 NON-COVERED ITEM OR SERVICE: HCPCS | Performed by: INTERNAL MEDICINE

## 2020-06-03 PROCEDURE — 700102 HCHG RX REV CODE 250 W/ 637 OVERRIDE(OP): Performed by: INTERNAL MEDICINE

## 2020-06-03 PROCEDURE — 99233 SBSQ HOSP IP/OBS HIGH 50: CPT | Performed by: INTERNAL MEDICINE

## 2020-06-03 PROCEDURE — 700102 HCHG RX REV CODE 250 W/ 637 OVERRIDE(OP): Performed by: PSYCHIATRY & NEUROLOGY

## 2020-06-03 PROCEDURE — 700111 HCHG RX REV CODE 636 W/ 250 OVERRIDE (IP): Performed by: INTERNAL MEDICINE

## 2020-06-03 PROCEDURE — 84295 ASSAY OF SERUM SODIUM: CPT

## 2020-06-03 PROCEDURE — 700105 HCHG RX REV CODE 258

## 2020-06-03 PROCEDURE — 99232 SBSQ HOSP IP/OBS MODERATE 35: CPT | Performed by: INTERNAL MEDICINE

## 2020-06-03 PROCEDURE — 82962 GLUCOSE BLOOD TEST: CPT

## 2020-06-03 PROCEDURE — 770020 HCHG ROOM/CARE - TELE (206)

## 2020-06-03 PROCEDURE — 700101 HCHG RX REV CODE 250: Performed by: INTERNAL MEDICINE

## 2020-06-03 PROCEDURE — 700111 HCHG RX REV CODE 636 W/ 250 OVERRIDE (IP): Performed by: HOSPITALIST

## 2020-06-03 PROCEDURE — 700105 HCHG RX REV CODE 258: Performed by: INTERNAL MEDICINE

## 2020-06-03 PROCEDURE — 80048 BASIC METABOLIC PNL TOTAL CA: CPT

## 2020-06-03 PROCEDURE — 99232 SBSQ HOSP IP/OBS MODERATE 35: CPT | Performed by: PSYCHIATRY & NEUROLOGY

## 2020-06-03 RX ORDER — MODAFINIL 100 MG/1
200 TABLET ORAL EVERY MORNING
Status: DISCONTINUED | OUTPATIENT
Start: 2020-06-04 | End: 2020-06-05 | Stop reason: HOSPADM

## 2020-06-03 RX ORDER — DESMOPRESSIN ACETATE 0.1 MG/1
50 TABLET ORAL EVERY 12 HOURS
Status: DISCONTINUED | OUTPATIENT
Start: 2020-06-03 | End: 2020-06-05 | Stop reason: HOSPADM

## 2020-06-03 RX ADMIN — Medication 1 CAPSULE: at 05:31

## 2020-06-03 RX ADMIN — GABAPENTIN 300 MG: 300 CAPSULE ORAL at 05:20

## 2020-06-03 RX ADMIN — DEXAMETHASONE 2 MG: 1 TABLET ORAL at 05:20

## 2020-06-03 RX ADMIN — LACOSAMIDE 300 MG: 100 TABLET, FILM COATED ORAL at 16:43

## 2020-06-03 RX ADMIN — AMLODIPINE BESYLATE 5 MG: 5 TABLET ORAL at 05:19

## 2020-06-03 RX ADMIN — LACOSAMIDE 300 MG: 100 TABLET, FILM COATED ORAL at 05:20

## 2020-06-03 RX ADMIN — ONDANSETRON HYDROCHLORIDE 4 MG: 2 SOLUTION INTRAMUSCULAR; INTRAVENOUS at 18:17

## 2020-06-03 RX ADMIN — MEROPENEM 500 MG: 500 INJECTION, POWDER, FOR SOLUTION INTRAVENOUS at 12:20

## 2020-06-03 RX ADMIN — INSULIN GLARGINE 12 UNITS: 100 INJECTION, SOLUTION SUBCUTANEOUS at 05:21

## 2020-06-03 RX ADMIN — LEVETIRACETAM 1500 MG: 500 TABLET ORAL at 05:19

## 2020-06-03 RX ADMIN — GABAPENTIN 300 MG: 300 CAPSULE ORAL at 16:42

## 2020-06-03 RX ADMIN — POTASSIUM BICARBONATE 50 MEQ: 978 TABLET, EFFERVESCENT ORAL at 05:19

## 2020-06-03 RX ADMIN — MODAFINIL 100 MG: 100 TABLET ORAL at 05:19

## 2020-06-03 RX ADMIN — LOSARTAN POTASSIUM 100 MG: 50 TABLET, FILM COATED ORAL at 16:42

## 2020-06-03 RX ADMIN — INSULIN GLARGINE 12 UNITS: 100 INJECTION, SOLUTION SUBCUTANEOUS at 16:42

## 2020-06-03 RX ADMIN — TOPIRAMATE 200 MG: 100 TABLET, FILM COATED ORAL at 05:20

## 2020-06-03 RX ADMIN — DESMOPRESSIN ACETATE 50 MCG: 0.1 TABLET ORAL at 16:42

## 2020-06-03 RX ADMIN — SODIUM CHLORIDE 500 ML: 9 INJECTION, SOLUTION INTRAVENOUS at 00:11

## 2020-06-03 RX ADMIN — ENOXAPARIN SODIUM 40 MG: 100 INJECTION SUBCUTANEOUS at 05:21

## 2020-06-03 RX ADMIN — MEROPENEM 500 MG: 500 INJECTION, POWDER, FOR SOLUTION INTRAVENOUS at 05:19

## 2020-06-03 RX ADMIN — DESMOPRESSIN ACETATE 50 MCG: 0.1 TABLET ORAL at 10:44

## 2020-06-03 RX ADMIN — TOPIRAMATE 200 MG: 100 TABLET, FILM COATED ORAL at 16:42

## 2020-06-03 RX ADMIN — LEVETIRACETAM 1500 MG: 500 TABLET ORAL at 16:42

## 2020-06-03 NOTE — CONSULTS
DATE OF SERVICE:  06/02/2020    NEPHROLOGY CONSULTATION    REQUESTING PHYSICIAN:  Liliana Pool MD.    REASON FOR CONSULTATION:  To evaluate a patient with diabetes insipidus.    HISTORY OF PRESENT ILLNESS:  The patient is a 60-year-old male with history of   hypertension and history of GBM left parietal post-resection in 02/2020,   admitted to the hospital on 05/14/2020 with recurrent seizures episodes.    Hospital course complicated with difficulties to control seizures, also   developed severe polyuria and hyponatremia.  His kidney function remains   stable with creatinine of 0.6 at baseline.    REVIEW OF SYSTEMS:  Not possible to obtain as the patient is with altered   mental status.    PAST MEDICAL HISTORY:  Hypertension, glioblastoma multiforme.    PAST SURGICAL HISTORY:  Brain tumor resection in 02/2020.    FAMILY HISTORY:  Positive for heart disease.    SOCIAL HISTORY:  No tobacco, no alcohol or drug use.    ALLERGIES:  No known drug allergies.    OUTPATIENT MEDICATIONS:  Reviewed.    PHYSICAL EXAMINATION:  VITAL SIGNS:  Blood pressure 127/71, heart rate 88, temperature 36.6 Celsius.  GENERAL APPEARANCE:  Well-developed, well-nourished male with altered mental   status, no acute distress.  HEENT:  Normocephalic, atraumatic.  Pupils equal, round, reactive to light.    NG tube in the left nostril.  Oropharynx, slightly dry mucosa.  NECK:  No lymphadenopathy, no thyromegaly appreciated.  LUNGS:  Clear to auscultation bilaterally.  No wheezes, no rhonchi.  HEART:  Regular rate.  No rub or gallop.  ABDOMEN:  Soft, nondistended.  Bowel sounds present, no palpable mass.  EXTREMITIES:  No cyanosis, clubbing, no edema.  NEUROLOGIC:  The patient is obtunded, not able to complete.    LABORATORY RESULTS:  Reviewed, revealed hemoglobin of 13.2.  Sodium 140,   potassium 4.6, CO2 is 21, BUN 20 and creatinine 0.6.    ASSESSMENT AND PLAN:  The patient is a 60-year-old male with history of   hypertension, history of  glioblastoma multiforme post-resection, now with   difficulty to control seizures with altered mental status, polyuria, and   hypernatremia.  1.  Diabetes insipidus is high possibility, to continue to monitor renal panel   daily.  We will attempt to give a dose of DDAVP and monitor for response.  2.  Electrolytes, hypernatremia well controlled.  Continue to monitor.  3.  Hypertension.  Blood pressure under excellent control, to monitor.  4.  Seizures, on 4 different medications per neurology.  5.  Volume, well controlled.    RECOMMENDATIONS:  1.  First dose of DDAVP today.  We will monitor and make decision and will   titrate dose, it depends on the urine output and sodium level.  2.  Daily basic metabolic panel.  3.  Keep well hydrated.  4.  Free water down NG tube.  5.  We will follow the patient closely.       ____________________________________     MD PATRICK COOK / TAMMY    DD:  06/02/2020 16:42:07  DT:  06/02/2020 21:27:41    D#:  [id number]  Job#:  [id number]

## 2020-06-03 NOTE — THERAPY
Attempted OT tx. D/t pt's medical status and continuous seizures pt on hold from therapy neuro MD ask for therapy to hold at this time. Will continue therapy as appropriate.

## 2020-06-03 NOTE — DISCHARGE PLANNING
CCA called the office of Dr. Francisco Neurosurgeon at [facility], spoke to Tennille. Tennille agreed to fax [facility] medical release that pt will sign in order to request records.   Contact information for     Dr. Francisco   Phone: [phone]    Fax: [phone].    CCA called the office of Dr. Regan with [facility] and left voicemail for Brunilda at .   Contact information for    Dr. Regan:   Phone: [phone]  Fax: [phone].    [phone]  Saurva left [facility] voicemail while in rounds, proving fax number for records.        @[phone]  CCA faxed records to both providers.

## 2020-06-03 NOTE — CARE PLAN
Problem: Safety  Goal: Will remain free from injury  Outcome: PROGRESSING AS EXPECTED  Note: PT remains free from s/s of injury     Problem: Safety - Medical Restraint  Goal: Free from restraint(s) (Restraint for Interference with Medical Device)  Description: INTERVENTIONS:  1. ONCE/SHIFT or MINIMUM Q12H: Assess and document the continuing need for restraints  2. Q24H: Continued use of restraint requires LIP to perform face to face examination and written order  3. Identify and implement measures to help patient regain control  Outcome: PROGRESSING AS EXPECTED  Flowsheets (Taken 6/3/2020 0149)  Addressed this shift: Free from restraint(s) (restraint for interference with medical device): ONCE/SHIFT or MINIMUM Every 12 hours: Assess and document the continuing need for restraints  Note: PT remains free from necessity of restraints     Problem: Safety:  Goal: Will remain free from injury  Outcome: PROGRESSING AS EXPECTED  Note: PT remains free from s/s of injury

## 2020-06-03 NOTE — PROGRESS NOTES
Hospital Medicine Daily Progress Note    Date of Service  6/2/2020    Chief Complaint  60 y.o. male admitted 5/13/2020 with seizure    Hospital Course  60 y.o. male admitted 5/13/2020 with a history of GBM and seizures.  Hospitalization complicated by status epilepticus, requiring ICU stay.  He continued to have AMS, initially thought to be hyperammonemia from valproic acid so was discontinued however patient continued to be altered.  Repeat EEG showed subclinical seizures.  Gabapentin was added to AED regimen which now includes: Gabapentin 300 mg twice daily, Vimpat 300 mg twice daily, Keppra 1500 mg twice daily, Topamax 200 mg every 12 hours.  Most recent MRI on 5/27/2020 showed postsurgical changes left parietal craniotomy again noted.  No significant change in thick-walled rim-enhancing left parietal mass/resection cavity and adjacent separate nodular focus of enhancement.  Surrounding vasogenic edema and/or nonenhancing tumor is also unchanged with stable mass-effect.  No acute infarct or new area of hemorrhage.  No significant interval change.  Most recent EEG 6/1/20:a few brief non convulsive focal left posterior quadrant seizures during the study. No changes despite further adjustments in anti-seizure medications. 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 The findings suggest a large underlying area of cortical irritability and structural abnormality. Clinical and radiological correlation is recommended.     His course was also complicated by ESBL E. coli UTI he was started on meropenem.  Patient was found to have testicular swelling more so on the right, ultrasound showed hydrocele.    Interval Problem Update  -EEG showed persistent subclinical seizures  -Neurology unsure if medical management will likely be effective enough to stop seizures and fix mental status, recommending either discussions with neurosurgery/radiation oncology as well as palliative care to discuss  goals of care  -Had long conversation with daughter Linh at bedside as well as family via Zoom call discussing patient's current medical state and options as brought forward from neurology, family would like to move forward by sending records to oncology team at [facility] to determine if patient would be a candidate for additional interventions and possible transfer.  -On room air  -Nephrology consulted today given hyponatremia (although improving), has continued to dump urine the last few days (over 4 L), was given desmopressin today, improvement in urine output, will check sodium    Consultants/Specialty  Neurology  Pulmonology/critical care  Palliative care    Code Status  Full code    Disposition  Will need aggressive PT, OT, SLP evaluations determine disposition planning once somnolence has improved.    Review of Systems  Review of Systems   Unable to perform ROS: Acuity of condition        Physical Exam  Temp:  [36 °C (96.8 °F)-36.8 °C (98.2 °F)] 36.4 °C (97.6 °F)  Pulse:  [68-98] 92  Resp:  [16-20] 16  BP: (100-127)/(53-81) 118/80  SpO2:  [94 %-98 %] 95 %    Physical Exam  Vitals signs and nursing note reviewed.   Constitutional:       General: He is not in acute distress.     Appearance: He is obese. He is not ill-appearing or toxic-appearing.      Comments: Slightly diaphoretic, somnolent  Body mass index is 32.25 kg/m².   HENT:      Head: Normocephalic.      Nose: Nose normal.      Comments: Ng tube in place     Mouth/Throat:      Mouth: Mucous membranes are moist.   Eyes:      General: No scleral icterus.        Right eye: No discharge.         Left eye: No discharge.      Conjunctiva/sclera: Conjunctivae normal.      Pupils: Pupils are equal, round, and reactive to light.   Neck:      Musculoskeletal: Normal range of motion.   Cardiovascular:      Rate and Rhythm: Normal rate.      Pulses: Normal pulses.      Heart sounds: Normal heart sounds. No murmur.   Pulmonary:      Effort: Pulmonary effort is normal.       Breath sounds: Normal breath sounds. No rhonchi or rales.      Comments: Diminished in bases  Chest:      Chest wall: No tenderness.   Abdominal:      General: Abdomen is flat. Bowel sounds are normal. There is no distension.      Palpations: Abdomen is soft.      Tenderness: There is no abdominal tenderness. There is no right CVA tenderness, left CVA tenderness, guarding or rebound.   Genitourinary:     Comments: Patient has testicular swelling, increased to the right side (did not examine today)  Musculoskeletal:         General: No swelling or deformity.      Right lower leg: No edema.      Left lower leg: No edema.   Skin:     General: Skin is warm.   Neurological:      Comments: Somnolent   No spontaneous eye opening, did not WD from pain, did wake up during daughter's visit this afternoon   Psychiatric:      Comments: Somnolent.         Fluids    Intake/Output Summary (Last 24 hours) at 6/2/2020 1746  Last data filed at 6/2/2020 1200  Gross per 24 hour   Intake 2229 ml   Output 2900 ml   Net -671 ml       Laboratory  Recent Labs     05/31/20  0439 06/01/20  0534   WBC 7.3 7.3   RBC 3.97* 4.12*   HEMOGLOBIN 12.4* 13.2*   HEMATOCRIT 37.7* 38.6*   MCV 95.0 93.7   MCH 31.2 32.0   MCHC 32.9* 34.2   RDW 48.9 46.9   PLATELETCT 189 197   MPV 10.5 10.3     Recent Labs     05/31/20  0439 06/01/20  0534   SODIUM 144 140   POTASSIUM 3.6 4.6   CHLORIDE 113* 107   CO2 20 21   GLUCOSE 176* 148*   BUN 22 20   CREATININE 0.65 0.64   CALCIUM 9.2 8.7                   Imaging       Assessment/Plan  * Encephalopathy acute- (present on admission)  Assessment & Plan  I discussed with the family 05/27/20  Patient independent relatively with ADLs, IADLs prior to presentation    Intermittent, waxing and waning GCS, remains intermittently persistent    Etiologies considered include: Polypharmacy/pharmacological/iatrogenic, recurrent seizures, infectious (UTI), /others.    Scopolamine patch which can be sedated - stopped 05/27/2020      I have discussed with Dr. Zhao from neurology, he agrees that the AED therapy could be sedated but recommends continuing this given high risk of development of status epilepticus if patient is weaned off this.  He reports patient may develop tolerance to this with time.  05/30/20 discussed again with Dr Zhao from neurology, given worsening ammonia levels and now developing transaminitis my concern is that if valproic acid could be contributing to worsening encephalopathy secondary to hyperammonemia and now leading to transaminitis. Dr Zhao agreed, from his perspective valproic acid not be adding much to the therapy anyways given he is on strong and potent 3 other antiepileptic drugs, valproic acid could be safely stopped at this time.  Patient did receive a dose this morning, will stop valproic acid per neurology recommendations and hopefully repeat an EEG over the course of next 24 to 48 hours.  On May 31, 2020, patient is more somnolent.  Discussed and evaluated the patient with Dr Zhao, from neurology and decision to initiate 24-hour continuous EEG again.  This showed subclinical seizures, none persistent seizures.  Neurology team has added gabapentin therapy now to the regimen on June 1, 2020.    Continue management of UTI with IV meropenem    Neurology team is evaluating the patient and following and available as needed    Maintain fluid hydration, avoid development of metabolic issues including electrolyte abnormalities.  Closely monitor sodium levels.  I have requested nephrology team evaluation for consideration of initiation of DDAVP.    TSH within normal limit, B12, folate normal    Maintain strict aspiration, fall and seizure precautions.    Polyuria- (present on admission)  Assessment & Plan  Suspect related to the history of brain surgery, GBM  Developed hypernatremia  Discussed with Dr. Emerson, nephrology she will evaluate the patient  Stop IV fluid hydration, recommends initiation of  lowest dose of desmopressin 6/2  Above discussed by me with Louise from pharmacy  Dr. Emerson to evaluate the patient  Monitor daily sodium levels    Acute cystitis without hematuria  Assessment & Plan  ESBL E. coli   Continue meropenem IV  Sensitive to fosfomycin  May consider fosfomycin over the coming days    Status epilepticus (HCC)- (present on admission)  Assessment & Plan  Patient on 4 drug antiepileptic drug therapy including topiramate, Vimpat, Keppra, gabapentin (added 6/1)  Discussed with neurology, valproic acid stopped May 30, 2020 given hyperammonemia and transaminitis  Neurology team is following, neurology team to titrate antiepileptic drug therapy as clinically appropriate  Most recent EEG: a few brief non convulsive focal left posterior quadrant seizures during the study. No changes despite further adjustments in anti-seizure medications. 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 The findings suggest a large underlying area of cortical irritability and structural abnormality. Clinical and radiological correlation is recommended  Maintain seizure precautions  Sending records to [facility] for any further surgical or radiation options    GBM (glioblastoma multiforme) (HCC)- (present on admission)  Assessment & Plan  s/p Left parietal craniotomy with subtotal resection. 2/26/2020  Followed by [facility]  History of radiation therapy  Previous MRI May 17, 2020 without any acute concerns, will need comparison for new findings compared to prior MRI.  Given persistent somnolence, interval MRI requested 05/27/20 without significant changes.   Neurology team is following  Patient is on Decadron, this will need to be tapered  Will need close outpatient neurology, oncology, radiation oncology and neurosurgery follow-up    Acute respiratory failure with hypoxia (HCC)- (present on admission)  Assessment & Plan  Stable now  Patient has intermittent episode of hypoxemia concerning for  obstructive sleep apnea, could be potentially central in etiology too  Continue close pulse oximetry and oxygen support as needed    Type 2 diabetes mellitus with hyperglycemia, without long-term current use of insulin ([facility])- (present on admission)  Assessment & Plan  A1C 7.5   Likely steroid induced   Continue basal insulin, Lantus 12 units twice daily  Sliding scale insulin No. 2  Will titrate to achieve normal glycemic control  Hypoglycemic protocol in place    Bandemia- (present on admission)  Assessment & Plan  Likely secondary to steroids, contributory from underlying acute cystitis  IV meropenem    Hypokalemia- (present on admission)  Assessment & Plan  Patient remains on enteral replacement, continue and monitor daily potassium levels    Essential hypertension- (present on admission)  Assessment & Plan  Continue losartan and amlodipine  Titrate to achieve normotensive control       VTE prophylaxis: SC Lovenox

## 2020-06-03 NOTE — PROGRESS NOTES
Nephrology Daily Progress Note    Date of Service  6/3/2020    Chief Complaint  60 y.o. male with GBM s/p tumor resection, admitted 5/13/2020 with seizures  Consulted for DI    Interval Problem Update  6/3 -lethargic, continues seizures  Polyuria slightly better after given dose of DDAVP  Hypernatremia well controlled now    Review of Systems  Review of Systems   Unable to perform ROS: Mental status change        Physical Exam  Temp:  [36.4 °C (97.6 °F)-37.8 °C (100 °F)] 36.5 °C (97.7 °F)  Pulse:  [] 99  Resp:  [16-20] 20  BP: (116-149)/(67-92) 137/79  SpO2:  [95 %-99 %] 96 %    Physical Exam  Constitutional:       General: He is not in acute distress.     Appearance: He is well-developed. He is not diaphoretic.   HENT:      Head: Normocephalic and atraumatic.      Nose: Nose normal.      Mouth/Throat:      Mouth: Mucous membranes are dry.   Eyes:      Pupils: Pupils are equal, round, and reactive to light.   Cardiovascular:      Rate and Rhythm: Normal rate and regular rhythm.      Pulses: Normal pulses.      Heart sounds: Normal heart sounds.   Pulmonary:      Effort: Pulmonary effort is normal. No respiratory distress.      Breath sounds: Normal breath sounds. No wheezing, rhonchi or rales.   Abdominal:      General: Bowel sounds are normal. There is distension.      Palpations: There is no mass.      Tenderness: There is no abdominal tenderness.   Musculoskeletal:         General: No swelling.      Right lower leg: No edema.      Left lower leg: No edema.   Skin:     General: Skin is warm.      Coloration: Skin is not jaundiced.      Findings: No erythema or rash.   Neurological:      Mental Status: He is lethargic.      Comments: AMS         Fluids    Intake/Output Summary (Last 24 hours) at 6/3/2020 1532  Last data filed at 6/3/2020 1200  Gross per 24 hour   Intake 3320 ml   Output 3375 ml   Net -55 ml       Laboratory  Recent Labs     06/01/20  0534   WBC 7.3   RBC 4.12*   HEMOGLOBIN 13.2*   HEMATOCRIT  38.6*   MCV 93.7   MCH 32.0   MCHC 34.2   RDW 46.9   PLATELETCT 197   MPV 10.3     Recent Labs     06/01/20  0534 06/02/20  1820 06/03/20  0430   SODIUM 140 141 141   POTASSIUM 4.6  --  3.9   CHLORIDE 107  --  106   CO2 21  --  23   GLUCOSE 148*  --  132*   BUN 20  --  24*   CREATININE 0.64  --  0.79   CALCIUM 8.7  --  9.0         Imaging  reviewed    Assessment/Plan  1.CKD II -stable  2.HTN:BP well controlled  3.Electrolytes: hypernatremia -improved -well controlled  4.Anemia: Hb stable  5.DI -on DDAVP    Recs: DDAVP 50 mcg BID             Close sodium monitoring             Will follow

## 2020-06-03 NOTE — THERAPY
Physical Therapy   Daily Treatment     Patient Name: Ahmet Escobedo  Age:  60 y.o., Sex:  male  Medical Record #: [id number]  Today's Date: 6/3/2020     Precautions: Nasogastric Tube, Swallow Precautions ( See Comments)       06/03/20 1342   Other Treatments   Other Treatments Provided Neuro MD recommends PT hold for now. Pt having continuous seizures, family deciding on plan of care. PT will resume when medically cleared to participate.

## 2020-06-03 NOTE — PROGRESS NOTES
12 hour chart check     Monitor Summary:     Sinus rhythm to sinus tachycardia, rate of 91-107bpm:     .22/.08/.32

## 2020-06-03 NOTE — PROGRESS NOTES
Neurology Progress Note  Neurohospitalist Service, [facility]    Referring Physician: Brunilda Mclaughlin M.D.    HPI: Refer to initial documented Neurology H&P, as detailed in the patient's chart.    Interval History 6/3/2020 Sinus tachy overnight with rare PVCs; otherwise no acute events.  No complaints this AM.  Starting modafinil today.    Review of systems: In addition to what is detailed in the HPI and/or updated in the interval history, all other systems reviewed and are negative.    Past Medical History:    has a past medical history of GBM (glioblastoma multiforme) (HCC) and Hypertension.    FHx:  family history includes Heart Disease in his father and mother.    SHx:   reports that he has never smoked. He has never used smokeless tobacco. He reports previous alcohol use. He reports that he does not use drugs.    Medications:    Current Facility-Administered Medications:   •  gabapentin (NEURONTIN) capsule 300 mg, 300 mg, Enteral Tube, BID, Jc Carrasco M.D., 300 mg at 06/03/20 0520  •  modafinil (PROVIGIL) tablet 100 mg, 100 mg, Oral, QAM, Jc Carrasco M.D., 100 mg at 06/03/20 0519  •  insulin glargine (LANTUS) injection 12 Units, 12 Units, Subcutaneous, BID, 12 Units at 06/03/20 0521 **AND** insulin lispro (HUMALOG) injection 2-9 Units, 2-9 Units, Subcutaneous, Q6HRS, Stopped at 06/03/20 0600 **AND** POC Blood Glucose, , , Q6H **AND** NOTIFY MD and PharmD, , , Once **AND** glucose 4 g chewable tablet 16 g, 16 g, Enteral Tube, Q15 MIN PRN **AND** dextrose 50% (D50W) injection 50 mL, 50 mL, Intravenous, Q15 MIN PRN, Liliana Pool M.D.  •  dexamethasone (DECADRON) tablet 2 mg, 2 mg, Enteral Tube, DAILY, Liliana Pool M.D., 2 mg at 06/03/20 0520  •  lactobacillus rhamnosus (CULTURELLE) capsule 1 Cap, 1 Cap, Enteral Tube, QDAY with Breakfast, Liliana Pool M.D., 1 Cap at 06/03/20 0531  •  meropenem (MERREM) 500 mg in  mL IVPB, 500 mg, Intravenous, Q6HRS, Liliana Pool M.D.,  Stopped at 06/03/20 0549  •  potassium bicarbonate (KLYTE) effervescent tablet 50 mEq, 50 mEq, Enteral Tube, DAILY, Nik Jones M.D., 50 mEq at 06/03/20 0519  •  lacosamide (VIMPAT) tablet 300 mg, 300 mg, Enteral Tube, BID, Nik Jones M.D., 300 mg at 06/03/20 0520  •  levETIRAcetam (KEPPRA) tablet 1,500 mg, 1,500 mg, Enteral Tube, BID, Nik Jones M.D., 1,500 mg at 06/03/20 0519  •  amLODIPine (NORVASC) tablet 5 mg, 5 mg, Enteral Tube, Q DAY, Nik Jones M.D., 5 mg at 06/03/20 0519  •  enoxaparin (LOVENOX) inj 40 mg, 40 mg, Subcutaneous, DAILY, Colton Perez M.D., 40 mg at 06/03/20 0521  •  Pharmacy Consult: Enteral tube insertion - review meds/change route/product selection, 1 Each, Other, PHARMACY TO DOSE, Colton Perez M.D.  •  losartan (COZAAR) tablet 100 mg, 100 mg, Enteral Tube, DAILY, Colton Perez M.D., 100 mg at 06/02/20 1747  •  topiramate (TOPAMAX) tablet 200 mg, 200 mg, Enteral Tube, Q12HRS, Colton ePrez M.D., 200 mg at 06/03/20 0520  •  acetaminophen (TYLENOL) tablet 650 mg, 650 mg, Enteral Tube, Q6HRS PRN, Colton Perez M.D., 650 mg at 05/20/20 1937  •  ondansetron (ZOFRAN ODT) dispertab 4 mg, 4 mg, Enteral Tube, Q4HRS PRN, Colton Perez M.D.  •  Respiratory Therapy Consult, , Nebulization, Continuous RT, Nikhil Erickson D.O.  •  ipratropium-albuterol (DUONEB) nebulizer solution, 3 mL, Nebulization, Q2HRS PRN (RT), Nikhil Erickson D.O.  •  ondansetron (ZOFRAN) syringe/vial injection 4 mg, 4 mg, Intravenous, Q4HRS PRN, Igor Carlos M.D.  •  LORazepam (ATIVAN) injection 4 mg, 4 mg, Intravenous, Q10 MIN PRN, Igor Carlos M.D., 4 mg at 05/15/20 1545  •  hydrALAZINE (APRESOLINE) injection 10 mg, 10 mg, Intravenous, Q6HRS PRN, Guido Pierre M.D., 10 mg at 05/30/20 2319    Physical Examination:     Vitals:    06/03/20 0000 06/03/20 0400 06/03/20 0635 06/03/20 0740   BP: 143/87 149/92 134/88 137/79   Pulse: 97 (!) 103 98 99   Resp: 18 20 16 20   Temp: 37 °C (98.6 °F) 37.8 °C (100 °F)  37.5 °C (99.5 °F) 36.5 °C (97.7 °F)   TempSrc: Temporal Temporal Temporal Temporal   SpO2: 98% 98% 98% 96%   Weight:       Height:           General: Patient is lethargic but in no acute distress  Eyes: examination of optic disks not indicated at this time  CV: RRR    NEUROLOGICAL EXAM:     Mental status: does not open eyes to noxious stimulus, does not follow simple commands  Speech and language: does not name or repeat, nonverbal  Cranial nerve exam: Pupils are equal, round and reactive to light bilaterally. Does not blink to threat on the right; blinks to threat on the left. Corneal reflex intact b/l. Left gaze preference that can be overcome with VOR. Face is notable for mild right nasolabial fold flattening.  Motor exam: does not participate in formal strength testing but moving the left arm spontaneously. Tone is flaccid on the right. No abnormal movements were seen on exam.  Sensory exam: withdraw and grimace to noxious stim on the left with no response to noxious stimulus on the right   Deep tendon reflexes:  1+ and symmetric. Toes down-going on the left and upgoing on the right  Coordination: nonparticipatory in formal testing  Gait: deferred due to mental status and lethargy    Objective Data:    Labs:  Lab Results   Component Value Date/Time    PROTHROMBTM 12.0 05/13/2020 10:36 PM    INR 0.87 05/13/2020 10:36 PM      Lab Results   Component Value Date/Time    WBC 7.3 06/01/2020 05:34 AM    RBC 4.12 (L) 06/01/2020 05:34 AM    HEMOGLOBIN 13.2 (L) 06/01/2020 05:34 AM    HEMATOCRIT 38.6 (L) 06/01/2020 05:34 AM    MCV 93.7 06/01/2020 05:34 AM    MCH 32.0 06/01/2020 05:34 AM    MCHC 34.2 06/01/2020 05:34 AM    MPV 10.3 06/01/2020 05:34 AM    NEUTSPOLYS 72.20 (H) 06/01/2020 05:34 AM    LYMPHOCYTES 15.60 (L) 06/01/2020 05:34 AM    MONOCYTES 5.50 06/01/2020 05:34 AM    EOSINOPHILS 2.00 06/01/2020 05:34 AM    BASOPHILS 0.70 06/01/2020 05:34 AM    ANISOCYTOSIS 1+ 05/25/2020 12:15 AM      Lab Results   Component  Value Date/Time    SODIUM 141 06/03/2020 04:30 AM    POTASSIUM 3.9 06/03/2020 04:30 AM    CHLORIDE 106 06/03/2020 04:30 AM    CO2 23 06/03/2020 04:30 AM    GLUCOSE 132 (H) 06/03/2020 04:30 AM    BUN 24 (H) 06/03/2020 04:30 AM    CREATININE 0.79 06/03/2020 04:30 AM      Lab Results   Component Value Date/Time    TRIGLYCERIDE 480 (H) 05/20/2020 03:32 AM       Lab Results   Component Value Date/Time    ALKPHOSPHAT 46 06/01/2020 05:34 AM    ASTSGOT 42 06/01/2020 05:34 AM    ALTSGPT 110 (H) 06/01/2020 05:34 AM    TBILIRUBIN 0.2 06/01/2020 05:34 AM        Imaging/Testing:    I interpreted and/or reviewed the patient's neuroimaging    IR-US GUIDED PIV   Final Result    Ultrasound-guided PERIPHERAL IV INSERTION performed by    qualified nursing staff as above.      IR-MIDLINE CATHETER INSERTION WO GUIDANCE > AGE 3   Final Result                  Ultrasound-guided midline placement performed by qualified nursing staff    as above.          MR-BRAIN-WITH & W/O   Final Result      1.  Postsurgical changes left parietal craniotomy again noted. No significant change in thick walled rim enhancing left parietal mass/resection cavity and adjacent separate nodular focus of enhancement. Surrounding vasogenic edema and/or nonenhancing    tumor is also unchanged with stable mass effect.   2.  No acute infarct or new area of hemorrhage.   3.  No significant interval change.      DX-ABDOMEN FOR TUBE PLACEMENT   Final Result      Feeding tube tip overlies the upper gastric body.      DX-CHEST-LIMITED (1 VIEW)   Final Result      1.  Feeding tube tip located high in position at the level of the gastroesophageal junction. Advancement is recommended.      2.  Cardiomegaly.      This was discussed with the patient's ICU nurse at 8:22 AM on 5/27/2020.      DX-ABDOMEN FOR TUBE PLACEMENT   Final Result      Cortrak nasogastric tube position consistent with intragastric placement.      DX-CHEST-PORTABLE (1 VIEW)   Final Result      1.  No focal  pulmonary consolidation.      2.  Extubation      DX-ABDOMEN FOR TUBE PLACEMENT   Final Result      Enteric tube tip projects over the stomach.      DX-CHEST-PORTABLE (1 VIEW)   Final Result      No significant change from prior exam.      DX-ABDOMEN FOR TUBE PLACEMENT   Final Result      Enteric tube tip projects over the distal stomach.      DX-CHEST-PORTABLE (1 VIEW)   Final Result      No significant change from prior exam.      DX-ABDOMEN FOR TUBE PLACEMENT   Final Result      Enteric tube terminates over the stomach.      DX-CHEST-PORTABLE (1 VIEW)   Final Result      Increasing left basilar opacity could be from atelectasis or consolidation      DX-CHEST-PORTABLE (1 VIEW)   Final Result      Stable chest except findings with bibasilar atelectasis.      DX-CHEST-PORTABLE (1 VIEW)   Final Result      Stable chest x-ray findings.      DX-CHEST-PORTABLE (1 VIEW)   Final Result      Stable hypoventilatory chest with stable hazy left basilar opacity could be from atelectasis or consolidation favored over pleural fluid      DX-CHEST-PORTABLE (1 VIEW)   Final Result         1. No significant interval change.      US-DUPLEX TESTICLE COMPLETE (COMBO)   Final Result      Large bilateral hydroceles, right more than left.      Unremarkable bilateral testes.      DX-CHEST-PORTABLE (1 VIEW)   Final Result         1. No significant interval change.      MR-BRAIN-WITH & W/O   Final Result      1.  No acute hemorrhage or ischemia   2.  Two areas of enhancement in the LEFT parietal tumor resection bed which could be posttherapeutic change or indicative of residual or recurrent glioblastoma. Direct comparison with prior imaging would be helpful to further assess.   3.  3 mm of LEFT-to-RIGHT midline shift   4.  Atrophy   5.  White matter changes      DX-ABDOMEN FOR TUBE PLACEMENT   Final Result      Cortrak feeding tube tip projects in the region of the distal stomach.      DX-CHEST-LIMITED (1 VIEW)   Final Result      1.  New  right subclavian vein central line tip projects in satisfactory position. No pneumothorax.      2.  Satisfactory position of endotracheal tube.      3.  Feeding tube is now present.      4.  No focal pulmonary consolidation.      DX-CHEST-PORTABLE (1 VIEW)   Final Result      1.  Interval placement of an endotracheal tube which terminates in satisfactory position at the level of the aortic arch.   2.  Mild right basilar atelectasis and/or consolidation.      DX-CHEST-PORTABLE (1 VIEW)   Final Result      No radiographic evidence of acute cardiopulmonary process.      CT-CTA NECK WITH & W/O-POST PROCESSING   Final Result      CT angiogram of the neck within normal limits.      CT-CTA HEAD WITH & W/O-POST PROCESS   Final Result      Left parietal mass resection with some blood vessels along the  operative bed margins. These could indicate healing response although local recurrence is also possible. Comparison with prior studies and correlation with operative history may prove    helpful to clarify      No acute arterial occlusion is identified      CT-HEAD W/O   Final Result      No acute intracranial hemorrhage is identified.      Left parietal vertex craniotomy with underlying vasogenic edema and mass with associated mild rightward midline shift, moderate left lateral ventricular effacement          Assessment and Plan:    Ahmet Escobedo is a 60 y.o. man with history of glioblastoma multiforme, status post surgical resection, who was admitted 5/15/20 for evaluation of status epilepticus.  Overnight, 5/31/20 and 6/1/20, had a few non-convulsive electrographic seizures.  It is unlikely that further titration or addition of another AED will provide any further benefit.  Primary team to consider other surgical options including targeted radiotherapy to the underlying mass     Plan:      - seizure precautions  - continue Vimpat 300mg BID  - continue Keppra 1500mg BID   - continue Topamax 200mg BID  - continue Gabapentin  400mg BID  - determine if a different Abx other than meropenem can be used as this medication can exacerbate seizures  - increase modafinil to 200mg qAM, starting 6/4/20  - palliative care consult for GOC discussion    3005 addendum: the patients records have been sent to [facility] and patient's oncology team.  At this juncture I do not have any additional recommendations to offer.  Once patient's team is able to determine if he is a surgical candidate or not, that will likely guide the family's decision.  Palliative care team is on board.  No further recommendations or further studies from a neurological standpoint at this time. Please re-consult if you have further questions or there is a change in status.    The evaluation of the patient, and recommended management, was discussed with the resident staff. I have performed a physical exam and reviewed and updated ROS and Plan today (6/3/2020). In review of yesterday's note (6/2/2020), there are no changes except as documented above.    Jc Carrasco MD  Director, [facility], [facility]  Neurohospitalist, [facility]  Clinical  of Neurology, [employer]  t) [phone] (f) [phone]

## 2020-06-03 NOTE — CARE PLAN
Problem: Fluid Volume:  Goal: Will maintain balanced intake and output  Outcome: PROGRESSING SLOWER THAN EXPECTED  Intervention: Monitor, educate, and encourage compliance with therapeutic intake of liquids  Note: Nephrology following- new med started, continue to monitor I/Os.     Problem: Mobility  Goal: Risk for activity intolerance will decrease  Outcome: PROGRESSING SLOWER THAN EXPECTED  Intervention: Provide rest periods  Note: Providing passive ROM.

## 2020-06-03 NOTE — PALLIATIVE CARE
Palliative Care follow-up  PC RN discussed pt with Dr. Mclaughlin. Per MD, a plan is in place and a family meeting is not needed today.     Plan: PC RN will follow and support as clinical picture evolves.     Thank you for allowing Palliative Care to support this patient and family. Contact [phone] for additional assistance, change in patient status, or with any questions/concerns.

## 2020-06-03 NOTE — PROGRESS NOTES
Hospital Medicine Daily Progress Note    Date of Service  6/3/2020    Chief Complaint  60 y.o. male admitted 5/13/2020 with seizure    Hospital Course  60 y.o. male admitted 5/13/2020 with a history of GBM and seizures.  Hospitalization complicated by status epilepticus, requiring ICU stay.  He continued to have AMS, initially thought to be hyperammonemia from valproic acid so was discontinued however patient continued to be altered.  Repeat EEG showed subclinical seizures.  Gabapentin was added to AED regimen which now includes: Gabapentin 300 mg twice daily, Vimpat 300 mg twice daily, Keppra 1500 mg twice daily, Topamax 200 mg every 12 hours.  Most recent MRI on 5/27/2020 showed postsurgical changes left parietal craniotomy again noted.  No significant change in thick-walled rim-enhancing left parietal mass/resection cavity and adjacent separate nodular focus of enhancement.  Surrounding vasogenic edema and/or nonenhancing tumor is also unchanged with stable mass-effect.  No acute infarct or new area of hemorrhage.  No significant interval change.  Most recent EEG 6/1/20:a few brief non convulsive focal left posterior quadrant seizures during the study. No changes despite further adjustments in anti-seizure medications. 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 The findings suggest a large underlying area of cortical irritability and structural abnormality. Clinical and radiological correlation is recommended.     His course was also complicated by ESBL E. coli UTI he was started on meropenem.  Patient was found to have testicular swelling more so on the right, ultrasound showed hydrocele.    Interval Problem Update  - No acute events overnight  - continues to wake up more when family is here in afternoons  - Afebrile  - was given DDAVP yesterday, Na 141, UOP 3.3L, nephrology on board and recommending DDAVP BID  - spoke with ID pharmacy team who thought that 8 days of  zosyn/meropenem was sufficient, will dc abx  - neurology added modafinil yesterday    Consultants/Specialty  Neurology  Pulmonology/critical care  Palliative care  Nephrology    Code Status  Full code    Disposition  Will need aggressive PT, OT, SLP evaluations determine disposition planning once somnolence has improved.    Review of Systems  Review of Systems   Unable to perform ROS: Acuity of condition        Physical Exam  Temp:  [36.5 °C (97.7 °F)-37.8 °C (100 °F)] 36.5 °C (97.7 °F)  Pulse:  [] 99  Resp:  [16-20] 20  BP: (116-149)/(67-92) 137/79  SpO2:  [96 %-99 %] 96 %    Physical Exam  Vitals signs and nursing note reviewed.   Constitutional:       General: He is not in acute distress.     Appearance: He is obese. He is not ill-appearing or toxic-appearing.      Comments: Somnolent, unresponsive on my exam, does open his eyes with family present   HENT:      Head: Normocephalic.      Nose: Nose normal.      Comments: Ng tube in place     Mouth/Throat:      Mouth: Mucous membranes are moist.   Eyes:      General:         Right eye: No discharge.         Left eye: No discharge.   Cardiovascular:      Rate and Rhythm: Normal rate and regular rhythm.      Heart sounds: Normal heart sounds. No murmur.   Pulmonary:      Effort: Pulmonary effort is normal.      Breath sounds: Normal breath sounds. No rhonchi or rales.      Comments: Diminished in bases  Chest:      Chest wall: No tenderness.   Abdominal:      General: Abdomen is flat. Bowel sounds are normal. There is no distension.      Palpations: Abdomen is soft.      Tenderness: There is no abdominal tenderness. There is no right CVA tenderness, left CVA tenderness, guarding or rebound.   Genitourinary:     Comments: Patient has testicular swelling, increased to the right side (did not examine today)  Musculoskeletal:         General: No swelling or deformity.      Right lower leg: No edema.      Left lower leg: No edema.   Skin:     General: Skin is warm.    Neurological:      Comments: Somnolent   No spontaneous eye opening, did not WD from pain, did wake up during daughter's visit this afternoon   Psychiatric:      Comments: Somnolent.         Fluids    Intake/Output Summary (Last 24 hours) at 6/3/2020 1648  Last data filed at 6/3/2020 1200  Gross per 24 hour   Intake 3320 ml   Output 3375 ml   Net -55 ml       Laboratory  Recent Labs     06/01/20  0534   WBC 7.3   RBC 4.12*   HEMOGLOBIN 13.2*   HEMATOCRIT 38.6*   MCV 93.7   MCH 32.0   MCHC 34.2   RDW 46.9   PLATELETCT 197   MPV 10.3     Recent Labs     06/01/20  0534 06/02/20  1820 06/03/20  0430   SODIUM 140 141 141   POTASSIUM 4.6  --  3.9   CHLORIDE 107  --  106   CO2 21  --  23   GLUCOSE 148*  --  132*   BUN 20  --  24*   CREATININE 0.64  --  0.79   CALCIUM 8.7  --  9.0                   Imaging       Assessment/Plan  * Encephalopathy acute- (present on admission)  Assessment & Plan  I discussed with the family 05/27/20  Patient independent relatively with ADLs, IADLs prior to presentation    Intermittent, waxing and waning GCS, remains intermittently persistent    Etiologies considered include: Polypharmacy/pharmacological/iatrogenic, recurrent seizures, infectious (UTI), /others.    Scopolamine patch which can be sedated - stopped 05/27/2020     I have discussed with Dr. Zhao from neurology, he agrees that the AED therapy could be sedated but recommends continuing this given high risk of development of status epilepticus if patient is weaned off this.  He reports patient may develop tolerance to this with time.  05/30/20 discussed again with Dr Zhao from neurology, given worsening ammonia levels and now developing transaminitis my concern is that if valproic acid could be contributing to worsening encephalopathy secondary to hyperammonemia and now leading to transaminitis. Dr Zaho agreed, from his perspective valproic acid not be adding much to the therapy anyways given he is on strong and potent 3  other antiepileptic drugs, valproic acid could be safely stopped at this time.  Patient did receive a dose this morning, will stop valproic acid per neurology recommendations and hopefully repeat an EEG over the course of next 24 to 48 hours.  On May 31, 2020, patient is more somnolent.  Discussed and evaluated the patient with Dr Zhao, from neurology and decision to initiate 24-hour continuous EEG again.  This showed subclinical seizures, none persistent seizures.  Neurology team has added gabapentin therapy now to the regimen on June 1, 2020.    Neurology team is evaluating the patient and following and available as needed    Maintain fluid hydration, avoid development of metabolic issues including electrolyte abnormalities.  Closely monitor sodium levels.  I have requested nephrology team evaluation for consideration of initiation of DDAVP.    TSH within normal limit, B12, folate normal    Maintain strict aspiration, fall and seizure precautions.    Polyuria- (present on admission)  Assessment & Plan  Suspect related to the history of brain surgery, GBM  Developed hypernatremia  Discussed with Dr. Emerson, nephrology following: recommending DDAVP BID, continue monitoring Na levels    Acute cystitis without hematuria  Assessment & Plan  ESBL E. coli   Treated with zosyn x 2 days, meropenem IV x 6 days, can dc today 6/3/20  Novak still in place    Status epilepticus (HCC)- (present on admission)  Assessment & Plan  Patient on 4 drug antiepileptic drug therapy including topiramate, Vimpat, Keppra, gabapentin (added 6/1)  Discussed with neurology, valproic acid stopped May 30, 2020 given hyperammonemia and transaminitis  Neurology team is following, neurology team to titrate antiepileptic drug therapy as clinically appropriate  Most recent EEG: a few brief non convulsive focal left posterior quadrant seizures during the study. No changes despite further adjustments in anti-seizure medications. 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 The findings suggest a large underlying area of cortical irritability and structural abnormality. Clinical and radiological correlation is recommended  Maintain seizure precautions  Sending records to [facility] for any further surgical or radiation options    GBM (glioblastoma multiforme) (HCC)- (present on admission)  Assessment & Plan  s/p Left parietal craniotomy with subtotal resection. 2/26/2020  Followed by [facility]  History of radiation therapy  Previous MRI May 17, 2020 without any acute concerns, will need comparison for new findings compared to prior MRI.  Given persistent somnolence, interval MRI requested 05/27/20 without significant changes.   Neurology team is following  Patient is on Decadron, this will need to be tapered  Reaching out to [facility] team regarding further possibly mgmt options    Acute respiratory failure with hypoxia (HCC)- (present on admission)  Assessment & Plan  Stable now  Patient has intermittent episode of hypoxemia concerning for obstructive sleep apnea, could be potentially central in etiology too  Continue close pulse oximetry and oxygen support as needed    Type 2 diabetes mellitus with hyperglycemia, without long-term current use of insulin (HCC)- (present on admission)  Assessment & Plan  A1C 7.5   Likely steroid induced   Continue basal insulin, Lantus 12 units twice daily  Sliding scale insulin No. 2  Will titrate to achieve normal glycemic control  Hypoglycemic protocol in place    Bandemia- (present on admission)  Assessment & Plan  Resolved  Likely secondary to steroids, contributory from underlying acute cystitis s/p treatment    Hypokalemia- (present on admission)  Assessment & Plan  Patient remains on enteral replacement, continue and monitor daily potassium levels    Essential hypertension- (present on admission)  Assessment & Plan  Continue losartan and amlodipine  Titrate to achieve normotensive control       VTE  prophylaxis: SC Lovenox

## 2020-06-04 ENCOUNTER — APPOINTMENT (OUTPATIENT)
Dept: RADIOLOGY | Facility: MEDICAL CENTER | Age: 60
DRG: 100 | End: 2020-06-04
Attending: INTERNAL MEDICINE
Payer: OTHER MISCELLANEOUS

## 2020-06-04 LAB
ANION GAP SERPL CALC-SCNC: 11 MMOL/L (ref 7–16)
BUN SERPL-MCNC: 26 MG/DL (ref 8–22)
CALCIUM SERPL-MCNC: 9.2 MG/DL (ref 8.5–10.5)
CHLORIDE SERPL-SCNC: 107 MMOL/L (ref 96–112)
CO2 SERPL-SCNC: 23 MMOL/L (ref 20–33)
COVID ORDER STATUS COVID19: NORMAL
CREAT SERPL-MCNC: 0.72 MG/DL (ref 0.5–1.4)
GLUCOSE BLD-MCNC: 136 MG/DL (ref 65–99)
GLUCOSE BLD-MCNC: 153 MG/DL (ref 65–99)
GLUCOSE BLD-MCNC: 153 MG/DL (ref 65–99)
GLUCOSE BLD-MCNC: 155 MG/DL (ref 65–99)
GLUCOSE SERPL-MCNC: 150 MG/DL (ref 65–99)
POTASSIUM SERPL-SCNC: 4.1 MMOL/L (ref 3.6–5.5)
SARS-COV-2 RNA RESP QL NAA+PROBE: NOTDETECTED
SODIUM SERPL-SCNC: 138 MMOL/L (ref 135–145)
SODIUM SERPL-SCNC: 141 MMOL/L (ref 135–145)
SPECIMEN SOURCE: NORMAL

## 2020-06-04 PROCEDURE — U0004 COV-19 TEST NON-CDC HGH THRU: HCPCS

## 2020-06-04 PROCEDURE — 82962 GLUCOSE BLOOD TEST: CPT | Mod: 91

## 2020-06-04 PROCEDURE — 700101 HCHG RX REV CODE 250: Performed by: INTERNAL MEDICINE

## 2020-06-04 PROCEDURE — A9270 NON-COVERED ITEM OR SERVICE: HCPCS | Performed by: PSYCHIATRY & NEUROLOGY

## 2020-06-04 PROCEDURE — 99232 SBSQ HOSP IP/OBS MODERATE 35: CPT | Performed by: INTERNAL MEDICINE

## 2020-06-04 PROCEDURE — 700102 HCHG RX REV CODE 250 W/ 637 OVERRIDE(OP): Performed by: INTERNAL MEDICINE

## 2020-06-04 PROCEDURE — 99233 SBSQ HOSP IP/OBS HIGH 50: CPT | Performed by: INTERNAL MEDICINE

## 2020-06-04 PROCEDURE — A9270 NON-COVERED ITEM OR SERVICE: HCPCS | Performed by: INTERNAL MEDICINE

## 2020-06-04 PROCEDURE — 80048 BASIC METABOLIC PNL TOTAL CA: CPT

## 2020-06-04 PROCEDURE — 700111 HCHG RX REV CODE 636 W/ 250 OVERRIDE (IP): Performed by: INTERNAL MEDICINE

## 2020-06-04 PROCEDURE — 700102 HCHG RX REV CODE 250 W/ 637 OVERRIDE(OP): Performed by: PSYCHIATRY & NEUROLOGY

## 2020-06-04 PROCEDURE — 770020 HCHG ROOM/CARE - TELE (206)

## 2020-06-04 PROCEDURE — 84295 ASSAY OF SERUM SODIUM: CPT

## 2020-06-04 PROCEDURE — 74018 RADEX ABDOMEN 1 VIEW: CPT

## 2020-06-04 PROCEDURE — 71045 X-RAY EXAM CHEST 1 VIEW: CPT

## 2020-06-04 PROCEDURE — C9803 HOPD COVID-19 SPEC COLLECT: HCPCS | Performed by: INTERNAL MEDICINE

## 2020-06-04 PROCEDURE — 36415 COLL VENOUS BLD VENIPUNCTURE: CPT

## 2020-06-04 RX ORDER — AMOXICILLIN 250 MG
1 CAPSULE ORAL 2 TIMES DAILY
Status: DISCONTINUED | OUTPATIENT
Start: 2020-06-04 | End: 2020-06-05 | Stop reason: HOSPADM

## 2020-06-04 RX ORDER — DEXAMETHASONE 4 MG/1
4 TABLET ORAL 2 TIMES DAILY
Status: DISCONTINUED | OUTPATIENT
Start: 2020-06-04 | End: 2020-06-05 | Stop reason: HOSPADM

## 2020-06-04 RX ORDER — POLYETHYLENE GLYCOL 3350 17 G/17G
1 POWDER, FOR SOLUTION ORAL DAILY
Status: DISCONTINUED | OUTPATIENT
Start: 2020-06-04 | End: 2020-06-05 | Stop reason: HOSPADM

## 2020-06-04 RX ADMIN — DOCUSATE SODIUM 50 MG AND SENNOSIDES 8.6 MG 1 TABLET: 8.6; 5 TABLET, FILM COATED ORAL at 17:00

## 2020-06-04 RX ADMIN — TOPIRAMATE 200 MG: 100 TABLET, FILM COATED ORAL at 16:59

## 2020-06-04 RX ADMIN — INSULIN GLARGINE 12 UNITS: 100 INJECTION, SOLUTION SUBCUTANEOUS at 17:47

## 2020-06-04 RX ADMIN — DEXAMETHASONE 4 MG: 4 TABLET ORAL at 17:00

## 2020-06-04 RX ADMIN — GABAPENTIN 300 MG: 300 CAPSULE ORAL at 05:31

## 2020-06-04 RX ADMIN — LACOSAMIDE 300 MG: 100 TABLET, FILM COATED ORAL at 06:00

## 2020-06-04 RX ADMIN — LACOSAMIDE 300 MG: 100 TABLET, FILM COATED ORAL at 16:59

## 2020-06-04 RX ADMIN — MODAFINIL 200 MG: 100 TABLET ORAL at 06:40

## 2020-06-04 RX ADMIN — DESMOPRESSIN ACETATE 50 MCG: 0.1 TABLET ORAL at 16:59

## 2020-06-04 RX ADMIN — INSULIN GLARGINE 12 UNITS: 100 INJECTION, SOLUTION SUBCUTANEOUS at 05:23

## 2020-06-04 RX ADMIN — DESMOPRESSIN ACETATE 50 MCG: 0.1 TABLET ORAL at 05:32

## 2020-06-04 RX ADMIN — Medication 1 CAPSULE: at 08:20

## 2020-06-04 RX ADMIN — TOPIRAMATE 200 MG: 100 TABLET, FILM COATED ORAL at 05:29

## 2020-06-04 RX ADMIN — LEVETIRACETAM 1500 MG: 500 TABLET ORAL at 17:00

## 2020-06-04 RX ADMIN — LOSARTAN POTASSIUM 100 MG: 50 TABLET, FILM COATED ORAL at 16:59

## 2020-06-04 RX ADMIN — LEVETIRACETAM 1500 MG: 500 TABLET ORAL at 05:26

## 2020-06-04 RX ADMIN — POLYETHYLENE GLYCOL 3350 1 PACKET: 17 POWDER, FOR SOLUTION ORAL at 15:12

## 2020-06-04 RX ADMIN — POTASSIUM BICARBONATE 50 MEQ: 978 TABLET, EFFERVESCENT ORAL at 05:33

## 2020-06-04 RX ADMIN — AMLODIPINE BESYLATE 5 MG: 5 TABLET ORAL at 05:30

## 2020-06-04 RX ADMIN — ENOXAPARIN SODIUM 40 MG: 100 INJECTION SUBCUTANEOUS at 05:33

## 2020-06-04 RX ADMIN — DEXAMETHASONE 2 MG: 1 TABLET ORAL at 05:32

## 2020-06-04 RX ADMIN — GABAPENTIN 300 MG: 300 CAPSULE ORAL at 16:59

## 2020-06-04 NOTE — PROGRESS NOTES
Hospital Medicine Daily Progress Note    Date of Service  6/4/2020    Chief Complaint  60 y.o. male admitted 5/13/2020 with seizures    Hospital Course  60 y.o. male admitted 5/13/2020 with a history of GBM and seizures.  Hospitalization complicated by status epilepticus, requiring ICU stay.  He continued to have AMS, initially thought to be hyperammonemia from valproic acid so was discontinued (and ammonia improved) however patient continued to be altered.  Repeat EEG showed subclinical seizures.  Gabapentin was added to AED regimen which now includes: Gabapentin 300 mg twice daily, Vimpat 300 mg twice daily, Keppra 1500 mg twice daily, Topamax 200 mg every 12 hours.  Most recent MRI on 5/27/2020 showed postsurgical changes left parietal craniotomy again noted.  No significant change in thick-walled rim-enhancing left parietal mass/resection cavity and adjacent separate nodular focus of enhancement.  Surrounding vasogenic edema and/or nonenhancing tumor is also unchanged with stable mass-effect.  No acute infarct or new area of hemorrhage.  No significant interval change.  Most recent EEG 6/1/20:a few brief non convulsive focal left posterior quadrant seizures during the study. No changes despite further adjustments in anti-seizure medications. 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 The findings suggest a large underlying area of cortical irritability and structural abnormality. Clinical and radiological correlation is recommended.    He was started on modafinil 100mg qam 6/3, then increased to 200mg qam 6/3.  Did seem to have some improvement in his mental status on 6/4/20 with following commands and waking up a little more.     The team reached out to his team at [facility] (Dr. Louise Rosas) who recommended increasing decadron 2mg daily -->4mg BID. His team in [facility] ultimately agreed for transfer.       His course was also complicated by ESBL E. coli UTI he was started on  zosyn/meropenem and treated for total of 8 days.  Patient was found to have testicular swelling more so on the right, ultrasound showed hydrocele.  His course was also c/b polyuria and hypernatremia, c/g DI, nephrology was consulted who initiated DDAVP BID with improvement in UOP and sodium levels.     Interval Problem Update  - Had episode of emesis overnight associated with oxygen desaturation requiring supplemental oxygen, was weaned off oxygen today.  Obtain chest x-ray and abdominal x-ray, chest x-ray without acute abnormalities, abdominal x-ray showed moderate stool so increased bowel regimen  -Afebrile  -This afternoon he was more awake, opening his eyes, using his left arm, and responding to commands  -Spoke with Dr. Louise Rosas from [facility] today regarding patient's course and management, she recommended increasing Decadron to 4 mg twice daily for a few days to see if that improves his seizures and mental status, they also agreed for transfer to [facility] with the agreement that he will at some point be transferred back    Consultants/Specialty  Neurology  Pulmonology/critical care  Palliative care  Nephrology  Neuro-oncology team [facility]    Code Status  Full code    Disposition  Transfer to [facility]    Review of Systems  Review of Systems   Unable to perform ROS: Acuity of condition        Physical Exam  Temp:  [36.2 °C (97.2 °F)-37.2 °C (99 °F)] 36.4 °C (97.6 °F)  Pulse:  [90-98] 97  Resp:  [17-20] 18  BP: (118-132)/(66-90) 132/90  SpO2:  [94 %-96 %] 96 %    Physical Exam  Vitals signs and nursing note reviewed.   Constitutional:       General: He is not in acute distress.     Appearance: He is obese. He is not ill-appearing or toxic-appearing.      Comments: Somnolent, unresponsive on my exam, does open his eyes with family present and moving left arm more today   HENT:      Head: Normocephalic.      Nose: Nose normal.      Comments: Ng tube in place     Mouth/Throat:      Mouth: Mucous membranes are moist.   Eyes:       General:         Right eye: No discharge.         Left eye: No discharge.   Cardiovascular:      Rate and Rhythm: Normal rate and regular rhythm.      Heart sounds: Normal heart sounds. No murmur.   Pulmonary:      Effort: Pulmonary effort is normal.      Breath sounds: Normal breath sounds. No rhonchi or rales.      Comments: Diminished in bases  Chest:      Chest wall: No tenderness.   Abdominal:      General: Abdomen is flat. Bowel sounds are normal. There is no distension.      Palpations: Abdomen is soft.      Tenderness: There is no abdominal tenderness. There is no right CVA tenderness, left CVA tenderness, guarding or rebound.   Genitourinary:     Comments: Patient has testicular swelling, increased to the right side   Musculoskeletal:         General: No swelling or deformity.      Right lower leg: No edema.      Left lower leg: No edema.   Skin:     General: Skin is warm and dry.   Neurological:      Comments: Somnolent   No spontaneous eye opening, did not WD from pain on my exam, did wake up during significant other's visit this afternoon and moved left arm, followed commands   Psychiatric:      Comments: Somnolent.         Fluids    Intake/Output Summary (Last 24 hours) at 6/4/2020 1658  Last data filed at 6/4/2020 1200  Gross per 24 hour   Intake 2220 ml   Output 3300 ml   Net -1080 ml       Laboratory      Recent Labs     06/03/20  0430 06/03/20  1729 06/04/20  0342   SODIUM 141 136 141   POTASSIUM 3.9  --  4.1   CHLORIDE 106  --  107   CO2 23  --  23   GLUCOSE 132*  --  150*   BUN 24*  --  26*   CREATININE 0.79  --  0.72   CALCIUM 9.0  --  9.2                   Imaging       Assessment/Plan  * Encephalopathy acute- (present on admission)  Assessment & Plan  I discussed with the family 05/27/20  Patient independent relatively with ADLs, IADLs prior to presentation    Intermittent, waxing and waning GCS, remains intermittently persistent    Etiologies considered include:  Polypharmacy/pharmacological/iatrogenic, recurrent seizures, infectious (UTI), /others.    Scopolamine patch which can be sedated - stopped 05/27/2020     I have discussed with Dr. Zhao from neurology, he agrees that the AED therapy could be sedated but recommends continuing this given high risk of development of status epilepticus if patient is weaned off this.  He reports patient may develop tolerance to this with time.  05/30/20 discussed again with Dr Zhao from neurology, given worsening ammonia levels and now developing transaminitis my concern is that if valproic acid could be contributing to worsening encephalopathy secondary to hyperammonemia and now leading to transaminitis. Dr Zhao agreed, from his perspective valproic acid not be adding much to the therapy anyways given he is on strong and potent 3 other antiepileptic drugs, valproic acid could be safely stopped at this time.  Patient did receive a dose this morning, will stop valproic acid per neurology recommendations and hopefully repeat an EEG over the course of next 24 to 48 hours.  On May 31, 2020, patient is more somnolent.  Discussed and evaluated the patient with Dr Zhao, from neurology and decision to initiate 24-hour continuous EEG again.  This showed subclinical seizures, none persistent seizures.  Neurology team has added gabapentin therapy now to the regimen on June 1, 2020.    Neurology team is evaluating the patient and following and available as needed    Maintain fluid hydration, avoid development of metabolic issues including electrolyte abnormalities.  Closely monitor sodium levels.  I have requested nephrology team evaluation for consideration of initiation of DDAVP.    TSH within normal limit, B12, folate normal    Maintain strict aspiration, fall and seizure precautions.    6/4: discussed with [facility], did say that in patients with GBM they can have particularly long post ictal periods, increasing steroids today, some  improvement today (modafinil may have helped)    Polyuria- (present on admission)  Assessment & Plan  Suspect related to the history of brain surgery, GBM  Developed hypernatremia  Discussed with Dr. Emerson, nephrology following: recommending DDAVP BID, continue monitoring Na levels    Acute cystitis without hematuria  Assessment & Plan  ESBL E. coli   Treated with zosyn x 2 days, meropenem IV x 6 days, can dc today 6/3/20  Novak still in place    Status epilepticus (HCC)- (present on admission)  Assessment & Plan  Patient on 4 drug antiepileptic drug therapy including topiramate, Vimpat, Keppra, gabapentin (added 6/1)  Discussed with neurology, valproic acid stopped May 30, 2020 given hyperammonemia and transaminitis    Most recent EEG: a few brief non convulsive focal left posterior quadrant seizures during the study. No changes despite further adjustments in anti-seizure medications. 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 The findings suggest a large underlying area of cortical irritability and structural abnormality. Clinical and radiological correlation is recommended    Maintain seizure precautions  Will try increasing decadron today (6/4/) 2mg daily-->4mg BID     GBM (glioblastoma multiforme) (HCC)- (present on admission)  Assessment & Plan  s/p Left parietal craniotomy with subtotal resection. 2/26/2020  Followed by [facility]  History of radiation therapy  Previous MRI May 17, 2020 without any acute concerns, will need comparison for new findings compared to prior MRI.  Given persistent somnolence, interval MRI requested 05/27/20 without significant changes.   Neurology team is following  Patient is on Decadron, will increase this today (6/4) 4mg BID  Transferring to [facility]    Acute respiratory failure with hypoxia (HCC)- (present on admission)  Assessment & Plan  Stable now  Patient has intermittent episode of hypoxemia concerning for obstructive sleep apnea, could be  potentially central in etiology too  Continue close pulse oximetry and oxygen support as needed    Type 2 diabetes mellitus with hyperglycemia, without long-term current use of insulin (HCC)- (present on admission)  Assessment & Plan  A1C 7.5   Likely steroid induced   Continue basal insulin, Lantus 12 units twice daily  Sliding scale insulin No. 2  Will titrate to achieve normal glycemic control  Hypoglycemic protocol in place    Bandemia- (present on admission)  Assessment & Plan  Resolved  Likely secondary to steroids, contributory from underlying acute cystitis s/p treatment    Hypokalemia- (present on admission)  Assessment & Plan  Patient remains on enteral replacement, continue and monitor daily potassium levels    Essential hypertension- (present on admission)  Assessment & Plan  Continue losartan and amlodipine  Titrate to achieve normotensive control       VTE prophylaxis: SC Lovenox

## 2020-06-04 NOTE — PROGRESS NOTES
Pt had 2 episodes of emesis, patient suctioned, tube feeding placed on hold. IV zofran given. O2 sat 88% after second episode of emesis, pt placed on 1L oxygen, NC.

## 2020-06-04 NOTE — CARE PLAN
Problem: Skin Integrity  Goal: Risk for impaired skin integrity will decrease  Outcome: PROGRESSING AS EXPECTED   Pt turned q2hr  Problem: Urinary Elimination:  Goal: Ability to reestablish a normal urinary elimination pattern will improve  Outcome: PROGRESSING AS EXPECTED   Condom cath for incontinence

## 2020-06-04 NOTE — PROGRESS NOTES
Pt obtunded, minimally verball. Spontaneous eye opening, withdraws to pain. Q2 turns in place. Condom catheter for incontinence. Telemonitoring in place. Contact isolation for ESBL in urine. NPO w/ cortrak. Bed alarm on.

## 2020-06-04 NOTE — PROGRESS NOTES
Nephrology Daily Progress Note    Date of Service  6/4/2020    Chief Complaint  60 y.o. male with GBM s/p tumor resection, admitted 5/13/2020 with seizures  Consulted for DI    Interval Problem Update  6/3 -lethargic, continues seizures  Polyuria slightly better after given dose of DDAVP  Hypernatremia well controlled now  6/4 - no improvement in mental status  Serum sodium well controlled  Polyuria better    Review of Systems  Review of Systems   Unable to perform ROS: Mental status change        Physical Exam  Temp:  [36.2 °C (97.2 °F)-37.1 °C (98.7 °F)] 36.5 °C (97.7 °F)  Pulse:  [] 96  Resp:  [17-20] 18  BP: (118-145)/(76-87) 122/78  SpO2:  [94 %-96 %] 96 %    Physical Exam  Vitals signs and nursing note reviewed.   Constitutional:       General: He is not in acute distress.     Appearance: He is well-developed.   HENT:      Head: Normocephalic and atraumatic.      Nose: Nose normal.   Eyes:      General: No scleral icterus.     Pupils: Pupils are equal, round, and reactive to light.   Cardiovascular:      Rate and Rhythm: Normal rate and regular rhythm.      Pulses: Normal pulses.      Heart sounds: Normal heart sounds. No friction rub. No gallop.    Pulmonary:      Effort: Pulmonary effort is normal. No respiratory distress.      Breath sounds: Normal breath sounds. No wheezing, rhonchi or rales.   Abdominal:      General: Bowel sounds are normal. There is no distension.      Palpations: Abdomen is soft. There is no mass.      Tenderness: There is no abdominal tenderness.   Musculoskeletal:      Right lower leg: No edema.      Left lower leg: No edema.   Skin:     General: Skin is warm.      Findings: No erythema or rash.   Neurological:      Comments: Altered mental status         Fluids    Intake/Output Summary (Last 24 hours) at 6/4/2020 1254  Last data filed at 6/4/2020 1118  Gross per 24 hour   Intake 1920 ml   Output 3300 ml   Net -1380 ml       Laboratory      Recent Labs     06/03/20  0430  06/03/20  1729 06/04/20  0342   SODIUM 141 136 141   POTASSIUM 3.9  --  4.1   CHLORIDE 106  --  107   CO2 23  --  23   GLUCOSE 132*  --  150*   BUN 24*  --  26*   CREATININE 0.79  --  0.72   CALCIUM 9.0  --  9.2         Imaging  reviewed    Assessment/Plan  1.CKD II -stable  2.HTN:BP well controlled  3.Electrolytes: hypernatremia -improved -well controlled  4.Anemia: Hb to monitor  5.DI -on DDAVP    Recs: continue current treatment             Close sodium monitoring             Will follow

## 2020-06-04 NOTE — CARE PLAN
Problem: Psychosocial Needs:  Goal: Ability to verbalize feelings about condition will improve  Outcome: PROGRESSING SLOWER THAN EXPECTED  Note: Pt. Obtunded and unable to speak.      Problem: Mobility  Goal: Risk for activity intolerance will decrease  Outcome: PROGRESSING SLOWER THAN EXPECTED  Note: Pt. Obtunded and unable to reposition self. Pt. Does not move BLE or RUE, pt. Moves LUE spontaneously.

## 2020-06-04 NOTE — DISCHARGE PLANNING
Received call from Dr Mclaughlin requesting to have patient transferred to [facility] for a higher level of care. Dr Mclaughlin stated that Dr Mendenhall was accepting the patient.     Spoke with Derian at the [facility] transfer center and faxed a face sheet to him.     Awaiting confirmation of acceptance.     Unable to reach patients insurance at this time.

## 2020-06-04 NOTE — THERAPY
Missed Therapy     Patient Name: Ahmet Escobedo  Age:  60 y.o., Sex:  male  Medical Record #: [id number]  Today's Date: 6/4/2020    Discussed missed therapy with RN       06/04/20 1015   Interdisciplinary Plan of Care Collaboration   IDT Collaboration with  Nursing   Collaboration Comments This SLP attempted to see patient for dysphagia tx session. Per RN, hold tx for today, as patient is lethargic. SLP will hold tx session and re-attempt later as able and appropriate. Thank you.

## 2020-06-05 VITALS
BODY MASS INDEX: 32.24 KG/M2 | TEMPERATURE: 97.7 F | WEIGHT: 200.62 LBS | OXYGEN SATURATION: 98 % | RESPIRATION RATE: 14 BRPM | DIASTOLIC BLOOD PRESSURE: 82 MMHG | HEART RATE: 98 BPM | SYSTOLIC BLOOD PRESSURE: 115 MMHG | HEIGHT: 66 IN

## 2020-06-05 LAB
ALBUMIN SERPL BCP-MCNC: 3.5 G/DL (ref 3.2–4.9)
ALBUMIN/GLOB SERPL: 1.4 G/DL
ALP SERPL-CCNC: 49 U/L (ref 30–99)
ALT SERPL-CCNC: 69 U/L (ref 2–50)
ANION GAP SERPL CALC-SCNC: 11 MMOL/L (ref 7–16)
AST SERPL-CCNC: 20 U/L (ref 12–45)
BASOPHILS # BLD AUTO: 0.5 % (ref 0–1.8)
BASOPHILS # BLD: 0.05 K/UL (ref 0–0.12)
BILIRUB SERPL-MCNC: 0.2 MG/DL (ref 0.1–1.5)
BUN SERPL-MCNC: 29 MG/DL (ref 8–22)
CALCIUM SERPL-MCNC: 9.5 MG/DL (ref 8.5–10.5)
CHLORIDE SERPL-SCNC: 103 MMOL/L (ref 96–112)
CO2 SERPL-SCNC: 21 MMOL/L (ref 20–33)
CREAT SERPL-MCNC: 0.65 MG/DL (ref 0.5–1.4)
EOSINOPHIL # BLD AUTO: 0.07 K/UL (ref 0–0.51)
EOSINOPHIL NFR BLD: 0.6 % (ref 0–6.9)
ERYTHROCYTE [DISTWIDTH] IN BLOOD BY AUTOMATED COUNT: 47.7 FL (ref 35.9–50)
GLOBULIN SER CALC-MCNC: 2.5 G/DL (ref 1.9–3.5)
GLUCOSE BLD-MCNC: 139 MG/DL (ref 65–99)
GLUCOSE BLD-MCNC: 148 MG/DL (ref 65–99)
GLUCOSE BLD-MCNC: 150 MG/DL (ref 65–99)
GLUCOSE BLD-MCNC: 192 MG/DL (ref 65–99)
GLUCOSE SERPL-MCNC: 166 MG/DL (ref 65–99)
HCT VFR BLD AUTO: 40.7 % (ref 42–52)
HGB BLD-MCNC: 13.9 G/DL (ref 14–18)
IMM GRANULOCYTES # BLD AUTO: 0.2 K/UL (ref 0–0.11)
IMM GRANULOCYTES NFR BLD AUTO: 1.8 % (ref 0–0.9)
LYMPHOCYTES # BLD AUTO: 1.21 K/UL (ref 1–4.8)
LYMPHOCYTES NFR BLD: 11 % (ref 22–41)
MCH RBC QN AUTO: 31.5 PG (ref 27–33)
MCHC RBC AUTO-ENTMCNC: 34.2 G/DL (ref 33.7–35.3)
MCV RBC AUTO: 92.3 FL (ref 81.4–97.8)
MONOCYTES # BLD AUTO: 0.67 K/UL (ref 0–0.85)
MONOCYTES NFR BLD AUTO: 6.1 % (ref 0–13.4)
NEUTROPHILS # BLD AUTO: 8.79 K/UL (ref 1.82–7.42)
NEUTROPHILS NFR BLD: 80 % (ref 44–72)
NRBC # BLD AUTO: 0 K/UL
NRBC BLD-RTO: 0 /100 WBC
PLATELET # BLD AUTO: 250 K/UL (ref 164–446)
PMV BLD AUTO: 10.8 FL (ref 9–12.9)
POTASSIUM SERPL-SCNC: 4.1 MMOL/L (ref 3.6–5.5)
PROT SERPL-MCNC: 6 G/DL (ref 6–8.2)
RBC # BLD AUTO: 4.41 M/UL (ref 4.7–6.1)
SODIUM SERPL-SCNC: 135 MMOL/L (ref 135–145)
SODIUM SERPL-SCNC: 138 MMOL/L (ref 135–145)
WBC # BLD AUTO: 11 K/UL (ref 4.8–10.8)

## 2020-06-05 PROCEDURE — A9270 NON-COVERED ITEM OR SERVICE: HCPCS | Performed by: INTERNAL MEDICINE

## 2020-06-05 PROCEDURE — 99232 SBSQ HOSP IP/OBS MODERATE 35: CPT | Performed by: INTERNAL MEDICINE

## 2020-06-05 PROCEDURE — 700102 HCHG RX REV CODE 250 W/ 637 OVERRIDE(OP): Performed by: PSYCHIATRY & NEUROLOGY

## 2020-06-05 PROCEDURE — A9270 NON-COVERED ITEM OR SERVICE: HCPCS | Performed by: PSYCHIATRY & NEUROLOGY

## 2020-06-05 PROCEDURE — 700102 HCHG RX REV CODE 250 W/ 637 OVERRIDE(OP): Performed by: INTERNAL MEDICINE

## 2020-06-05 PROCEDURE — 85025 COMPLETE CBC W/AUTO DIFF WBC: CPT

## 2020-06-05 PROCEDURE — 36415 COLL VENOUS BLD VENIPUNCTURE: CPT

## 2020-06-05 PROCEDURE — 94760 N-INVAS EAR/PLS OXIMETRY 1: CPT

## 2020-06-05 PROCEDURE — 80053 COMPREHEN METABOLIC PANEL: CPT

## 2020-06-05 PROCEDURE — 700111 HCHG RX REV CODE 636 W/ 250 OVERRIDE (IP): Performed by: INTERNAL MEDICINE

## 2020-06-05 PROCEDURE — 99238 HOSP IP/OBS DSCHRG MGMT 30/<: CPT | Mod: CS | Performed by: INTERNAL MEDICINE

## 2020-06-05 PROCEDURE — 700101 HCHG RX REV CODE 250: Performed by: INTERNAL MEDICINE

## 2020-06-05 PROCEDURE — 82962 GLUCOSE BLOOD TEST: CPT | Mod: 91

## 2020-06-05 PROCEDURE — 84295 ASSAY OF SERUM SODIUM: CPT

## 2020-06-05 RX ORDER — AMLODIPINE BESYLATE 5 MG/1
5 TABLET ORAL DAILY
Qty: 30 TAB
Start: 2020-06-06

## 2020-06-05 RX ORDER — POLYETHYLENE GLYCOL 3350 17 G/17G
17 POWDER, FOR SOLUTION ORAL DAILY
Refills: 3
Start: 2020-06-06

## 2020-06-05 RX ORDER — LEVETIRACETAM 750 MG/1
1500 TABLET ORAL 2 TIMES DAILY
Qty: 60 TAB
Start: 2020-06-05

## 2020-06-05 RX ORDER — MODAFINIL 200 MG/1
200 TABLET ORAL EVERY MORNING
Qty: 30 TAB | Refills: 0
Start: 2020-06-06 | End: 2020-07-06

## 2020-06-05 RX ORDER — LACOSAMIDE 150 MG/1
300 TABLET ORAL 2 TIMES DAILY
Qty: 60 TAB
Start: 2020-06-05 | End: 2020-09-03

## 2020-06-05 RX ORDER — ACETAMINOPHEN 325 MG/1
650 TABLET ORAL EVERY 6 HOURS PRN
Qty: 30 TAB | Refills: 0
Start: 2020-06-05

## 2020-06-05 RX ORDER — DESMOPRESSIN ACETATE 0.1 MG/1
0.05 TABLET ORAL EVERY 12 HOURS
Qty: 30 TAB
Start: 2020-06-05

## 2020-06-05 RX ORDER — INSULIN GLARGINE 100 [IU]/ML
12 INJECTION, SOLUTION SUBCUTANEOUS 2 TIMES DAILY
Qty: 10 ML
Start: 2020-06-05

## 2020-06-05 RX ORDER — ONDANSETRON 2 MG/ML
4 INJECTION INTRAMUSCULAR; INTRAVENOUS EVERY 4 HOURS PRN
Qty: 84 ML
Start: 2020-06-05

## 2020-06-05 RX ORDER — GABAPENTIN 300 MG/1
300 CAPSULE ORAL 2 TIMES DAILY
Qty: 90 CAP
Start: 2020-06-05

## 2020-06-05 RX ORDER — AMOXICILLIN 250 MG
1 CAPSULE ORAL 2 TIMES DAILY
Qty: 30 TAB | Refills: 0
Start: 2020-06-05

## 2020-06-05 RX ORDER — LORAZEPAM 2 MG/ML
4 INJECTION INTRAMUSCULAR
Refills: 0
Start: 2020-06-05 | End: 2020-09-03

## 2020-06-05 RX ORDER — DEXAMETHASONE 4 MG/1
4 TABLET ORAL 2 TIMES DAILY
Start: 2020-06-05

## 2020-06-05 RX ADMIN — DEXAMETHASONE 4 MG: 4 TABLET ORAL at 05:50

## 2020-06-05 RX ADMIN — LACOSAMIDE 300 MG: 100 TABLET, FILM COATED ORAL at 18:25

## 2020-06-05 RX ADMIN — TOPIRAMATE 200 MG: 100 TABLET, FILM COATED ORAL at 18:25

## 2020-06-05 RX ADMIN — GABAPENTIN 300 MG: 300 CAPSULE ORAL at 05:50

## 2020-06-05 RX ADMIN — INSULIN GLARGINE 12 UNITS: 100 INJECTION, SOLUTION SUBCUTANEOUS at 06:00

## 2020-06-05 RX ADMIN — LEVETIRACETAM 1500 MG: 500 TABLET ORAL at 18:24

## 2020-06-05 RX ADMIN — DOCUSATE SODIUM 50 MG AND SENNOSIDES 8.6 MG 1 TABLET: 8.6; 5 TABLET, FILM COATED ORAL at 05:50

## 2020-06-05 RX ADMIN — DEXAMETHASONE 4 MG: 4 TABLET ORAL at 18:25

## 2020-06-05 RX ADMIN — MODAFINIL 200 MG: 100 TABLET ORAL at 05:59

## 2020-06-05 RX ADMIN — LOSARTAN POTASSIUM 100 MG: 50 TABLET, FILM COATED ORAL at 18:25

## 2020-06-05 RX ADMIN — TOPIRAMATE 200 MG: 100 TABLET, FILM COATED ORAL at 05:51

## 2020-06-05 RX ADMIN — DESMOPRESSIN ACETATE 50 MCG: 0.1 TABLET ORAL at 05:51

## 2020-06-05 RX ADMIN — LEVETIRACETAM 1500 MG: 500 TABLET ORAL at 05:51

## 2020-06-05 RX ADMIN — POTASSIUM BICARBONATE 50 MEQ: 978 TABLET, EFFERVESCENT ORAL at 06:04

## 2020-06-05 RX ADMIN — AMLODIPINE BESYLATE 5 MG: 5 TABLET ORAL at 05:51

## 2020-06-05 RX ADMIN — LACOSAMIDE 300 MG: 100 TABLET, FILM COATED ORAL at 05:51

## 2020-06-05 RX ADMIN — DESMOPRESSIN ACETATE 50 MCG: 0.1 TABLET ORAL at 18:25

## 2020-06-05 RX ADMIN — Medication 1 CAPSULE: at 05:50

## 2020-06-05 RX ADMIN — ENOXAPARIN SODIUM 40 MG: 100 INJECTION SUBCUTANEOUS at 05:48

## 2020-06-05 RX ADMIN — GABAPENTIN 300 MG: 300 CAPSULE ORAL at 18:25

## 2020-06-05 RX ADMIN — INSULIN GLARGINE 12 UNITS: 100 INJECTION, SOLUTION SUBCUTANEOUS at 18:23

## 2020-06-05 RX ADMIN — POLYETHYLENE GLYCOL 3350 1 PACKET: 17 POWDER, FOR SOLUTION ORAL at 05:49

## 2020-06-05 ASSESSMENT — FIBROSIS 4 INDEX: FIB4 SCORE: 0.59

## 2020-06-05 NOTE — DISCHARGE INSTRUCTIONS
Discharge Instructions    Discharged to other by ambulance with escort. Discharged via ambulance, hospital escort: Yes.  Special equipment needed: Oxygen    Be sure to schedule a follow-up appointment with your primary care doctor or any specialists as instructed.     Discharge Plan:   Diet Plan: Discussed  Activity Level: Discussed  Confirmed Follow up Appointment: No (Comments)  Confirmed Symptoms Management: Discussed  Medication Reconciliation Updated: Yes    I understand that a diet low in cholesterol, fat, and sodium is recommended for good health. Unless I have been given specific instructions below for another diet, I accept this instruction as my diet prescription.   Other diet: Tube feed    Special Instructions: None    · Is patient discharged on Warfarin / Coumadin?   No     Depression / Suicide Risk    As you are discharged from this [facility] facility, it is important to learn how to keep safe from harming yourself.    Recognize the warning signs:  · Abrupt changes in personality, positive or negative- including increase in energy   · Giving away possessions  · Change in eating patterns- significant weight changes-  positive or negative  · Change in sleeping patterns- unable to sleep or sleeping all the time   · Unwillingness or inability to communicate  · Depression  · Unusual sadness, discouragement and loneliness  · Talk of wanting to die  · Neglect of personal appearance   · Rebelliousness- reckless behavior  · Withdrawal from people/activities they love  · Confusion- inability to concentrate     If you or a loved one observes any of these behaviors or has concerns about self-harm, here's what you can do:  · Talk about it- your feelings and reasons for harming yourself  · Remove any means that you might use to hurt yourself (examples: pills, rope, extension cords, firearm)  · Get professional help from the community (Mental Health, Substance Abuse, psychological counseling)  · Do not be  alone:Call your Safe Contact- someone whom you trust who will be there for you.  · Call your local CRISIS HOTLINE 846-0793 or 764-712-2936  · Call your local Children's Mobile Crisis Response Team [place] (071) 060-8119 or www.[employer]  · Call the toll free National Suicide Prevention Hotlines   · National Suicide Prevention Lifeline 291-053-TKPJ (5454)  · National [employer] Line Network 800-SUICIDE (555-5195)

## 2020-06-05 NOTE — DISCHARGE PLANNING
Transfer Center:    Transfer Back Agreement faxed to unit for MD and patient signatures. Mica VU on unit aware. Please fax back to [facility] once signatures obtained at [phone].

## 2020-06-05 NOTE — DISCHARGE PLANNING
[facility] transfer center called.   MD to MD conference occurred between their Neurologist Dr Mendenhall and Dr Mclaughlin.   Patient is being accepted by Dr Mendenhall.  Patient transfer is contingent upon insurance authorization and room availability.

## 2020-06-05 NOTE — DISCHARGE PLANNING
Received transfer back agreement from MIRELLA Nino.     Faxed transfer back agreement and Discharge Summary to [facility].     Received a call from Ezequiel at [facility] transfer center who stated he will call with a bed when available.

## 2020-06-05 NOTE — DISCHARGE PLANNING
Received a call from Yulisa at the [facility] transfer center.     Patient to be transferred to     [facility]  [address]  [place], [place] [address]    [address]  Room 864.     Rn report to [phone]    Awaiting [facility] estimated  time is 1830.

## 2020-06-05 NOTE — CARE PLAN
Problem: Knowledge Deficit  Goal: Knowledge of disease process/condition, treatment plan, diagnostic tests, and medications will improve  Outcome: PROGRESSING SLOWER THAN EXPECTED  Goal: Knowledge of the prescribed therapeutic regimen will improve  Outcome: PROGRESSING SLOWER THAN EXPECTED     Problem: Discharge Barriers/Planning  Goal: Patient's continuum of care needs will be met  Outcome: PROGRESSING SLOWER THAN EXPECTED     Problem: Knowledge Deficit:  Goal: Knowledge of the prescribed therapeutic regimen will improve  Outcome: PROGRESSING SLOWER THAN EXPECTED

## 2020-06-05 NOTE — CARE PLAN
Problem: Communication  Goal: The ability to communicate needs accurately and effectively will improve  Outcome: PROGRESSING AS EXPECTED   Pt is obtunded. Oriented frequently.     Problem: Safety  Goal: Will remain free from falls  Outcome: PROGRESSING AS EXPECTED   Bed locked in lowest position. Call light in reach. Bed alarm on. Hourly rounding in place.

## 2020-06-05 NOTE — DISCHARGE PLANNING
LSW spoke with Deana from insurance [phone]. Deana requested an explanation on why pt needed transfer to [facility]. LSW stated [facility] requested transport. Deana asked why were transports needed, LSW stated that LSW does not specifics due to not being medical. LSW stated that there are various factors. Deana asked if pt needs EEG and steroids if pt would be able to receive in [facility], LSW stated LSW was unable to answer.     Deana stated she will contact transfer.    LSW updated Hospitalist RN.

## 2020-06-05 NOTE — DISCHARGE PLANNING
Spoke with Felipe at [facility] to set up will call for patient to transport to [facility].     Clinicals, H&P, Phone # for Awa at [employer]  given and facesheet faxed to [phone] so they can get transport authorization.

## 2020-06-05 NOTE — DISCHARGE PLANNING
Call received from Shobha @ [facility] seeking clarification regarding referral request for transfer & clinical information/update; clinical notes faxed to [facility] (fax [phone]) per Shobha’s request.

## 2020-06-05 NOTE — DISCHARGE PLANNING
Called patients insurance, [employer], and spoke with Daniele.   Per Daniele for facility and transport auth we will need to go through [facility]. Phone number is [phone].

## 2020-06-05 NOTE — DISCHARGE SUMMARY
Discharge Summary    CHIEF COMPLAINT ON ADMISSION  No chief complaint on file.      Reason for Admission  Seizures    CODE STATUS  Full Code    HPI & HOSPITAL COURSE  60 y.o. male with h/o DMT2, HTN, and GBM admitted 5/13/2020 with seizures.    Patient was in his usual state of health until the day prior to admission when he was noted to have onset of right-sided convulsive movements and slurred speech.  He reports that this happened with his past seizures but that the length and intensity of the seizure was different.  He was started on Topamax, valproate, Vimpat as well as increased dexamethasone 4 mg every 12 hours and Ativan (total 6 mg).  Patient became somnolent and given the quick escalation of his antiseizure meds to control seizures he was transferred to ICU for further management.  He was ultimately intubated on 5/15/2020 and underwent bronchoscopy on 5/15 and again on 5/17.  He was ultimately extubated on 5/24/2020 and was transferred to the floor on 5/25/2020. BAL cultures negative.  COVID NEGATIVE.    Floor course:  He continued to have AMS, initially thought to be hyperammonemia from valproic acid so was discontinued (and ammonia improved) however patient continued to be altered.  Repeat EEG showed subclinical seizures.  Gabapentin was added to AED regimen which now includes: Gabapentin 300 mg twice daily, Vimpat 300 mg twice daily, Keppra 1500 mg twice daily, Topamax 200 mg every 12 hours.  He had been weaned to decadron 2mg daily.  Most recent MRI on 5/27/2020 showed postsurgical changes left parietal craniotomy again noted.  No significant change in thick-walled rim-enhancing left parietal mass/resection cavity and adjacent separate nodular focus of enhancement.  Surrounding vasogenic edema and/or nonenhancing tumor is also unchanged with stable mass-effect.  No acute infarct or new area of hemorrhage.  No significant interval change.  Most recent EEG 6/1/20:a few brief non convulsive focal left  posterior quadrant seizures during the study. No changes despite further adjustments in anti-seizure medications. 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 The findings suggest a large underlying area of cortical irritability and structural abnormality. Clinical and radiological correlation is recommended.     He was started on modafinil 100mg qam 6/3, then increased to 200mg qam 6/3.  Did seem to have some improvement in his mental status on 6/4/20 with following commands and waking up a little more.      The team reached out to his team at [facility] (Dr. Louise Rosas) who recommended increasing decadron 2mg daily -->4mg BID (6/4/20).      His course was also complicated by ESBL E. coli UTI he was started on zosyn/meropenem and treated for total of 8 days.  Patient was found to have testicular swelling more so on the right, ultrasound showed hydrocele.  His course was also c/b polyuria and hypernatremia, c/f DI, nephrology was consulted who initiated low dose DDAVP BID with improvement in UOP and sodium levels.  Checking sodium BID, Na today 135, if sodium starts dropping nephrology recommending dropping pm dose.     He has a cortrak/NG in place receiving feeds.  He did have episode of emesis on 6/4/20.  CXR negative for signs of aspiration PNA. KUB showed moderate stool and otherwise normal gas patterns so bowel regimen was increased.  No emesis since.  He's intermittently on 0.5-1L NC when sleeping.  His is mostly somnolent and nonresponsive until his family comes in in the afternoon, then he responds to them, opens his eyes, moves his left arm, even was following commands 6/4/20.       He has h/o DMT2, on lantus 12 units BID with SSI.        Therefore, he is discharged in guarded and stable condition inpatient hospital [facility].    The patient met 2-midnight criteria for an inpatient stay at the time of discharge.      FOLLOW UP ITEMS POST DISCHARGE  EEG now that a little more  awake  Monitor Na, may need to wean DDAVP    DISCHARGE DIAGNOSES  Principal Problem:    Encephalopathy acute POA: Yes  Active Problems:    GBM (glioblastoma multiforme) (HCC) POA: Yes    Status epilepticus (HCC) POA: Yes    Acute cystitis without hematuria POA: No    Polyuria POA: Yes    Essential hypertension POA: Yes    Hypokalemia POA: Yes    Bandemia POA: Yes    Type 2 diabetes mellitus with hyperglycemia, without long-term current use of insulin (HCC) POA: Yes    Acute respiratory failure with hypoxia ([facility]) POA: Yes  Resolved Problems:    * No resolved hospital problems. *      FOLLOW UP  No future appointments.  Jass Velazquez M.D.  [address] [place] [id number] [phone]            MEDICATIONS ON DISCHARGE     Medication List      START taking these medications      Instructions   acetaminophen 325 MG Tabs  Commonly known as:  TYLENOL   2 Tabs by Per NG Tube route every 6 hours as needed (Mild Pain; (Pain scale 1-3); Temp greater than 100.5 F).  Dose:  650 mg     amLODIPine 5 MG Tabs  Start taking on:  June 6, 2020  Commonly known as:  NORVASC   1 Tab by Per NG Tube route every day.  Dose:  5 mg     desmopressin 0.1 MG Tabs  Commonly known as:  DDAVP   Take 0.5 Tabs by mouth every 12 hours.  Dose:  50 mcg     enoxaparin 40 MG/0.4ML Soln inj  Start taking on:  June 6, 2020  Commonly known as:  LOVENOX   Inject 40 mg as instructed every day.  Dose:  40 mg     gabapentin 300 MG Caps  Commonly known as:  NEURONTIN   1 Cap by Per NG Tube route 2 Times a Day.  Dose:  300 mg     insulin glargine 100 UNIT/ML Soln  Commonly known as:  LANTUS   Inject 12 Units as instructed 2 Times a Day.  Dose:  12 Units     insulin lispro 100 UNIT/ML  Commonly known as:  HUMALOG   Inject 2-9 Units as instructed every 6 hours.  Dose:  2-9 Units     Lacosamide 150 MG Tabs   300 mg by Enteral Tube route 2 Times a Day for 90 days.  Dose:  300 mg     LORazepam 2 MG/ML Soln  Commonly known as:  ATIVAN   2 mL by  Intravenous route every 1 hour as needed (for uncontrolled seizures) for up to 90 days.  Dose:  4 mg     modafinil 200 MG Tabs  Start taking on:  June 6, 2020  Commonly known as:  PROVIGIL   Take 1 Tab by mouth every morning for 30 days.  Dose:  200 mg     ondansetron 4 MG/2ML Soln injection  Commonly known as:  ZOFRAN   2 mL by Intravenous route every four hours as needed for Nausea.  Dose:  4 mg     polyethylene glycol/lytes Pack  Start taking on:  June 6, 2020  Commonly known as:  MIRALAX   Take 1 Packet by mouth every day.  Dose:  17 g     potassium bicarbonate 25 MEQ tablet  Start taking on:  June 6, 2020  Commonly known as:  KLYTE   2 Tabs by Enteral Tube route every day.  Dose:  50 mEq     senna-docusate 8.6-50 MG Tabs  Commonly known as:  PERICOLACE or SENOKOT S   Take 1 Tab by mouth 2 Times a Day.  Dose:  1 Tab     topiramate 200 MG tablet  Commonly known as:  TOPAMAX   1 Tab by Per NG Tube route every 12 hours.  Dose:  200 mg        CHANGE how you take these medications      Instructions   dexamethasone 4 MG Tabs  What changed:    · medication strength  · how much to take  · when to take this  Commonly known as:  DECADRON   Take 1 Tab by mouth 2 times a day.  Dose:  4 mg     levetiracetam 750 MG tablet  What changed:  how much to take  Commonly known as:  KEPPRA   Take 2 Tabs by mouth 2 times a day.  Dose:  1,500 mg        CONTINUE taking these medications      Instructions   losartan 100 MG Tabs  Commonly known as:  COZAAR   Take 100 mg by mouth every day.  Dose:  100 mg        STOP taking these medications    chlorthalidone 25 MG Tabs  Commonly known as:  HYGROTON     potassium chloride SA 20 MEQ Tbcr  Commonly known as:  Kdur     sulfamethoxazole-trimethoprim 800-160 MG tablet  Commonly known as:  BACTRIM DS     Tylenol PM Extra Strength  MG Tabs  Generic drug:  diphenhydrAMINE-APAP (sleep)            Allergies  No Known Allergies    DIET  Orders Placed This Encounter   Procedures   • Diet NPO      Standing Status:   Standing     Number of Occurrences:   1     Order Specific Question:   Restrict to:     Answer:   Strict [1]       ACTIVITY  As tolerated.  Weight bearing as tolerated    LINES, DRAINS, AND WOUNDS  This is an automated list. Peripheral IVs will be removed prior to discharge.  Midline IV 06/01/20 Left;Upper Upper arm (Active)   Site Assessment Clean;Dry;Intact 06/05/20 0900   Dressing Type Securing device;Transparent 06/05/20 0900   Line Status Infusing 06/05/20 0900   Dressing Status Clean;Dry;Intact 06/05/20 0900   Dressing Intervention N/A 06/05/20 0900   Dressing Change Due 06/08/20 06/04/20 2000   Infiltration Grading ([facility]) 0 06/05/20 0900   Phlebitis Scale ([facility] Only) 0 06/05/20 0900       Peripheral IV 06/01/20 Left;Medial Forearm (Active)   Site Assessment Clean;Dry;Intact 06/05/20 0900   Dressing Type Transparent 06/05/20 0900   Line Status Saline locked 06/05/20 0900   Dressing Status Clean;Dry;Intact 06/05/20 0900   Dressing Intervention N/A 06/05/20 0900   Infiltration Grading ([facility]) 0 06/05/20 0900   Phlebitis Scale ([facility] Only) 0 06/05/20 0900     External Urinary Catheter ([facility]) (Active)   Collection Container Standard drainage bag 06/04/20 2000   Output (mL) 900 mL 06/04/20 1800         Peripheral IV 06/01/20 Left;Medial Forearm (Active)   Site Assessment Clean;Dry;Intact 06/05/20 0900   Dressing Type Transparent 06/05/20 0900   Line Status Saline locked 06/05/20 0900   Dressing Status Clean;Dry;Intact 06/05/20 0900   Dressing Intervention N/A 06/05/20 0900   Infiltration Grading ([facility]) 0 06/05/20 0900   Phlebitis Scale ([facility] Only) 0 06/05/20 0900               MENTAL STATUS ON TRANSFER  Level of Consciousness: Obtunded  Orientation : Unable to Assess  Speech: Expressive Aphasia    CONSULTATIONS  Intensivist  Neurology  Palliative care  Nephrology    PROCEDURES    Intubation 5/15  Bronchoscopy 5/15, 5/17    EEG 5/14/20: This is an abnormal video EEG  recording in the awake, drowsy, and   sleep state(s). There is left hemisphere slowing. Continuous left   posterior quadrant spike, polyspikes, sharps. These exhibited a   left lateralized periodic pattern during most of the eeg.   Initially, frequent seizures were captured from the left   posterior quadrant, with some seizures correlating with aphasia   or confusion, and others without clear clinical changes. Seizures   were brief (between 10-20 seconds) for the most part but recurred   every few minutes initially, suggesting focal electrographic   status epilepticus. With further adjustments in anti-seizure   medications, there has been noticeable improvement with seizures   occurring every few hours during overnight recording.  The   findings suggest a large underlying area of cortical irritability   and structural abnormality. The findings suggest PLDs plus   seizures. Clinical and radiological correlation is recommended.     EEG 6/1/20: INTERPRETATION:   This is an abnormal 24 hrs video EEG recording in the awake,   drowsy, and sleep states. A mild encephalopathy is suggested.   There is slowing on the left cerebral hemisphere. Continuous   sharps in the left posterior quadrant, intermittently becoming   briefly periodic or rhythmic, with suggestion of a few brief non   convulsive focal left posterior quadrant seizures during the   study. No changes despite further adjustments in anti-seizure   medications. The patient suffers from pharmaco-resistant focal   epilepsy due to brain tumor. The findings suggest a large   underlying area of cortical irritability and structural   abnormality. Clinical and radiological correlation is   recommended.       Discharge Exam:  Physical Exam   Constitutional: He is well-developed, well-nourished, and in no distress.   HENT:   Head: Normocephalic and atraumatic.   Mouth/Throat: Oropharynx is clear and moist.   Cortrak in place   Eyes: Pupils are equal, round, and reactive to  light. Conjunctivae are normal. Right eye exhibits no discharge. Left eye exhibits no discharge. No scleral icterus.   Neck: Neck supple.   Cardiovascular: Normal rate and regular rhythm. Exam reveals no gallop and no friction rub.   No murmur heard.  Pulmonary/Chest: Effort normal and breath sounds normal. No stridor. He has no wheezes. He has no rales.   Abdominal: Soft. Bowel sounds are normal. He exhibits no distension. There is no abdominal tenderness.   Musculoskeletal:         General: No edema.   Neurological:   Somnolent, nonresponsive  Does not awake to physician voice or pain (sternal rub, nail press) however when family arrives usually wakes up, moves left arm, wiggles toes and sometimes follows commands (hand squeezes)         LABORATORY  Lab Results   Component Value Date    SODIUM 135 06/05/2020    POTASSIUM 4.1 06/05/2020    CHLORIDE 103 06/05/2020    CO2 21 06/05/2020    GLUCOSE 166 (H) 06/05/2020    BUN 29 (H) 06/05/2020    CREATININE 0.65 06/05/2020        Lab Results   Component Value Date    WBC 11.0 (H) 06/05/2020    HEMOGLOBIN 13.9 (L) 06/05/2020    HEMATOCRIT 40.7 (L) 06/05/2020    PLATELETCT 250 06/05/2020      Recent Labs     05/30/20  0314 05/31/20  0439 06/01/20  0534 06/01/20  0535 06/05/20  0316   ASTSGOT 28 29 42  --  20   ALTSGPT 83* 87* 110*  --  69*   TBILIRUBIN 0.2 0.2 0.2  --  0.2   GLOBULIN 2.4 2.3 2.2  --  2.5   AMMONIA 68* 45  --  22  --      Results     Procedure Component Value Units Date/Time    SARS-CoV-2, PCR (In-House) [[id number]] Collected:  06/04/20 1711    Order Status:  Completed Updated:  06/04/20 1812     SARS-CoV-2 Source NP Swab     SARS-CoV-2 by PCR NotDetected     Comment: [facility] providers: PLEASE REFER TO DE-ESCALATION AND RETESTING PROTOCOL  on [email or web address]  **The [address] GeneXpert Xpress SARS-CoV-2 Test has been made available for  use under the Emergency Use Authorization (EUA) only.         Narrative:       [id number] NELSON M  State test,   "screen  Is this test for diagnosis or screening?->Screen    COVID/SARS CoV-2 [[id number]] Collected:  06/04/20 1711    Order Status:  Completed Specimen:  Respirate from Nasopharyngeal Updated:  06/04/20 1715     COVID Order Status Received    Narrative:       [id number] VERN PARSON  State test,  screen  Is this test for diagnosis or screening?->Screen    BLOOD CULTURE [[id number]] Collected:  05/27/20 0953    Order Status:  Completed Specimen:  Blood from Peripheral Updated:  06/01/20 1100     Significant Indicator NEG     Source BLD     Site PERIPHERAL     Culture Result No growth after 5 days of incubation.    Narrative:       Per Hospital Policy: Only change Specimen Src: to \"Line\" if  specified by physician order.  Left Hand    BLOOD CULTURE [[id number]] Collected:  05/27/20 0948    Order Status:  Completed Specimen:  Blood from Peripheral Updated:  06/01/20 1100     Significant Indicator NEG     Source BLD     Site PERIPHERAL     Culture Result No growth after 5 days of incubation.    Narrative:       Per Hospital Policy: Only change Specimen Src: to \"Line\" if  specified by physician order.  Right Hand    URINE CULTURE(NEW) [[id number]]  (Abnormal)  (Susceptibility) Collected:  05/27/20 1300    Order Status:  Completed Specimen:  Urine, Straight Cath Updated:  05/31/20 0729     Significant Indicator POS     Source UR     Site URINE, STRAIGHT CATH     Culture Result -      Escherichia coli ESBL  >100,000 cfu/mL  Extended Spectrum Beta-lactamase (ESBL) isolated.  ESBL's may be clinically resistant to therapy with  Penicillins,Cephalosporins or Aztreonam despite  apparent in vitro susceptibility to some of these agents.  The patient requires contact isolation.  Please contact pharmacy or an Infectious Disease Specialist  if you have any questions about appropriate therapy.      Narrative:       CALL  [place]  MARILYN tel. [phone],  CALLED  MARILYN tel. [phone] 05/29/2020, 09:37, RB PERF. RESULTS " CALLED  TO:[id number]  Collected By:[id number] ALISHA ARRIAGA  Indication for culture:->Acute unexplained altered mental  status ONLY after ruling out other recognized cause  Collected By:[id number] ALISHA ARRIAGA    Susceptibility     Escherichia coli esbl (1)     Antibiotic Interpretation Microscan Method Status    Ampicillin Resistant >16 mcg/mL BLAKE Final    Ceftriaxone Extended Spectrum Beta Lactamase >32 mcg/mL BLAKE Final    Ceftazidime Extended Spectrum Beta Lactamase >16 mcg/mL BLAKE Final    Cefotaxime Extended Spectrum Beta Lactamase >32 mcg/mL BLAKE Final    Cefazolin Resistant >16 mcg/mL BLAKE Final    Ciprofloxacin Resistant >2 mcg/mL BLAKE Final    Cefepime Resistant >16 mcg/mL BLAKE Final    Ampicillin/sulbactam Resistant >16/8 mcg/mL BLAKE Final    Cefotetan Sensitive <=16 mcg/mL BLAKE Final    Fosfomycin Sensitive 4 mcg/mL BLAKE Final    Nitrofurantoin Sensitive <=32 mcg/mL BLAKE Final    Gentamicin Resistant >8 mcg/mL BLAKE Final    Levofloxacin Resistant >4 mcg/mL BLAKE Final    Pip/Tazobactam Intermediate 64 mcg/mL BLAKE Final    Trimeth/Sulfa Resistant >2/38 mcg/mL BLAKE Final    Tobramycin Resistant >8 mcg/mL BLAKE Final                       Imaging    US testicles 5/17/20:  IMPRESSION:     Large bilateral hydroceles, right more than left.     Unremarkable bilateral testes.    MRI Brain with and without 5/27/20:  IMPRESSION:     1.  Postsurgical changes left parietal craniotomy again noted. No significant change in thick walled rim enhancing left parietal mass/resection cavity and adjacent separate nodular focus of enhancement. Surrounding vasogenic edema and/or nonenhancing   tumor is also unchanged with stable mass effect.  2.  No acute infarct or new area of hemorrhage.  3.  No significant interval change.    DX-CHEST-PORTABLE (1 VIEW)  Narrative: 6/4/2020 1:07 PM    HISTORY/REASON FOR EXAM:  hypoxia after emesis.    TECHNIQUE/EXAM DESCRIPTION AND NUMBER OF VIEWS:  Single portable view of the chest.    COMPARISON:  5/27/2020    FINDINGS:    LUNGS: Clear. No effusions.  PNEUMOTHORAX: None.  LINES AND TUBES: Enteric tube tip is in the stomach.  MEDIASTINUM: No cardiomegaly.  MUSCULOSKELETAL STRUCTURES: No acute displaced fracture.  Impression: No acute cardiopulmonary abnormality.  [id number] VIEW  Narrative: 6/4/2020 1:07 PM    HISTORY/REASON FOR EXAM:  Vomiting.    TECHNIQUE/EXAM DESCRIPTION AND NUMBER OF VIEWS:  2 view(s) of the abdomen.    COMPARISON: None    FINDINGS:  Nonobstructive bowel gas pattern. Small to moderate amount of stool throughout the colon.  No signs of free air.  Enteric tube tip is in the stomach.  Suspected T11 compression fracture.  Impression: 1.  Nonobstructive bowel gas pattern. Small to moderate amount of stool throughout the colon.  2.  Enteric tube tip is in the stomach.  3.  Suspected T11 compression fracture.          Total time of the discharge process exceeds 30 minutes.

## 2020-06-05 NOTE — PROGRESS NOTES
Nephrology Daily Progress Note    Date of Service  6/5/2020    Chief Complaint  60 y.o. male with GBM s/p tumor resection, admitted 5/13/2020 with seizures  Consulted for DI    Interval Problem Update  6/3 -lethargic, continues seizures  Polyuria slightly better after given dose of DDAVP  Hypernatremia well controlled now  6/4 - no improvement in mental status  Serum sodium well controlled  Polyuria better  6/5 -still with AMS  Polyuria better  Na level stable    Review of Systems  Review of Systems   Unable to perform ROS: Mental status change        Physical Exam  Temp:  [36.3 °C (97.4 °F)-37 °C (98.6 °F)] 36.3 °C (97.4 °F)  Pulse:  [] 100  Resp:  [18-24] 18  BP: (119-141)/(81-97) 119/86  SpO2:  [93 %-97 %] 97 %    Physical Exam  Vitals signs and nursing note reviewed.   Constitutional:       General: He is not in acute distress.     Appearance: He is well-developed. He is obese. He is not diaphoretic.   HENT:      Head: Normocephalic and atraumatic.      Nose: Nose normal.      Comments: NGT     Mouth/Throat:      Mouth: Mucous membranes are dry.   Eyes:      Pupils: Pupils are equal, round, and reactive to light.   Cardiovascular:      Rate and Rhythm: Normal rate and regular rhythm.   Pulmonary:      Effort: Pulmonary effort is normal.      Breath sounds: Normal breath sounds.   Abdominal:      General: Bowel sounds are normal. There is distension.      Palpations: Abdomen is soft.   Musculoskeletal:      Right lower leg: No edema.      Left lower leg: No edema.   Skin:     General: Skin is warm.      Findings: No erythema or rash.   Neurological:      Mental Status: He is lethargic.      Comments: Altered mental status         Fluids    Intake/Output Summary (Last 24 hours) at 6/5/2020 1258  Last data filed at 6/5/2020 1200  Gross per 24 hour   Intake 3080 ml   Output 2420 ml   Net 660 ml       Laboratory  Recent Labs     06/05/20  0316   WBC 11.0*   RBC 4.41*   HEMOGLOBIN 13.9*   HEMATOCRIT 40.7*   MCV  92.3   MCH 31.5   MCHC 34.2   RDW 47.7   PLATELETCT 250   MPV 10.8     Recent Labs     06/03/20  0430  06/04/20  0342 06/04/20  1742 06/05/20  0316   SODIUM 141   < > 141 138 135   POTASSIUM 3.9  --  4.1  --  4.1   CHLORIDE 106  --  107  --  103   CO2 23  --  23  --  21   GLUCOSE 132*  --  150*  --  166*   BUN 24*  --  26*  --  29*   CREATININE 0.79  --  0.72  --  0.65   CALCIUM 9.0  --  9.2  --  9.5    < > = values in this interval not displayed.         Imaging  reviewed    Assessment/Plan  1.CKD II -stable  2.HTN:BP well controlled  3.Electrolytes: hypernatremia -improved -well controlled  4.Anemia: Hb to monitor  5.DI -on DDAVP    Recs: continue current treatment             Close sodium monitoring -if serum Na drops below 134 hold next DDAVP dose             Will follow

## 2020-06-05 NOTE — RESPIRATORY CARE
Oxygen Rounds      Patient found on    O2 L/m:  2  Oxygen device:  snc  Spo2: 98%      Patient titrated to   O2 L/m: 1  Oxygen device: snc  Spo2: 97%   Respiratory device skin site inspection completed.

## 2020-06-06 NOTE — PROGRESS NOTES
Patient discharged via careflight with careflight team. Discharge paper work given. Florence Soto at bedside during discharge. All belongings sent with daughter.

## 2020-06-10 LAB
FUNGUS SPEC CULT: NORMAL
SIGNIFICANT IND 70042: NORMAL
SITE SITE: NORMAL
SOURCE SOURCE: NORMAL

## 2020-06-12 ENCOUNTER — HOSPITAL ENCOUNTER (OUTPATIENT)
Facility: MEDICAL CENTER | Age: 60
End: 2020-06-12
Payer: OTHER MISCELLANEOUS

## 2020-06-13 NOTE — DOCUMENTATION QUERY
[facility]                                                                       Query Response Note      PATIENT:               CLARIBEL GREENE  ACCT #:                  [id number]  MRN:                     [id number]  :                      1960  ADMIT DATE:       2020 10:34 PM  DISCH DATE:        2020 7:33 PM  RESPONDING  PROVIDER #:        [id number]           QUERY TEXT:    Encephalopathy is documented in the Medical Record. Please specify type.    NOTE:  If an appropriate response is not listed below, please respond with a new note.    The patient's Clinical Indicators include:  Acute encephalopathy is documented through out the chart.    * Encephalopathy acute- (present on admission)  Assessment & Plan  I discussed with the family 20  Patient independent relatively with ADLs, IADLs prior to presentation  Intermittent, waxing and waning GCS, remains intermittently persistent  Etiologies considered include: Polypharmacy/pharmacological/iatrogenic, recurrent seizures, infectious (UTI), /others.  Options provided:   -- Due to medications or drugs   -- Due to postictal state   -- Metabolic encephalopathy   -- Toxic encephalopathy   -- Other type of encephalopathy   -- Unable to determine      Query created by: Meredith Oden on 2020 11:52 AM    RESPONSE TEXT:    Unable to determine          Electronically signed by:  LAUREN HOFF MD 2020 6:12 PM

## 2020-07-11 LAB
MYCOBACTERIUM SPEC CULT: NORMAL
RHODAMINE-AURAMINE STN SPEC: NORMAL
SIGNIFICANT IND 70042: NORMAL
SITE SITE: NORMAL
SOURCE SOURCE: NORMAL

## 2021-01-10 NOTE — CARE PLAN
Problem: Safety  Goal: Will remain free from injury  Outcome: PROGRESSING AS EXPECTED  Note: Seizure safety precautions in place.     Problem: Venous Thromboembolism (VTW)/Deep Vein Thrombosis (DVT) Prevention:  Goal: Patient will participate in Venous Thrombosis (VTE)/Deep Vein Thrombosis (DVT)Prevention Measures  Outcome: PROGRESSING AS EXPECTED  Note: SCDs in place. Lovenox per mar.     Problem: Knowledge Deficit  Goal: Knowledge of the prescribed therapeutic regimen will improve  Outcome: PROGRESSING AS EXPECTED  Note: Discussed medications, sedation and EEG w/ family. All questions/concerns addressed.     Problem: Safety:  Goal: Will remain free from injury  Outcome: PROGRESSING AS EXPECTED  Note: Seizure safety precautions in place.     Problem: Knowledge Deficit:  Goal: Knowledge of the prescribed therapeutic regimen will improve  Outcome: PROGRESSING AS EXPECTED  Note: Discussed medications, sedation and EEG w/ family. All questions/concerns addressed.      69

## 2024-01-01 NOTE — CARE CONFERENCE
Interval Note - Family Meeting    Case discussed with 1 daughter in person, joined by two daughters via Zoom call on iPad.    Briefed and updated on hospital course and interval history including EEG report(s).  Discussed prognosis and introduced pathway options.  Offered palliative care consultation to assist with goals of care discussion.    Daughters agree that they will send records from [facility] and this hospital admission to oncology team at [facility] to determine if patient is a candidate for any additional interventions including radiotherapy.  Discussed that there is low likelihood for seizure resolution on medication regimen alone given refractory status as evidenced my medication regimen; should determine if underlying lesion is able to be addressed any further.  I think the risk of sedation and reintubation is very high and would assuredly increase morbidity and mortality.    Answered all questions.  Will continue to follow.    Jc Carrasco MD  Director, [facility], [facility]  Neurohospitalist, [facility]  Clinical  of Neurology, [employer]  t) [phone] (f) [phone]   Statement Selected

## 2025-04-05 NOTE — PROGRESS NOTES
NEUROLOGY PROGRESS NOTE      BACKGROUND:    60 y.o. male was admitted on 5/13/2020 10:34 PM for Seizure (HCC) [R56.9].       SUBJECTIVE:   No seizure activity reported.  Apparently he was extubated yesterday and is currently awake and alert and able to follow commands.  His last EEG and 5/22 did not reveal ongoing seizure activity.    VITALS:  Vitals:    05/25/20 0700 05/25/20 0800 05/25/20 0900 05/25/20 1000   BP: 138/84 114/84 106/59 137/72   Pulse: (!) 102 87 68 89   Resp: (!) 26 (!) 21 (!) 23 (!) 21   Temp:       TempSrc: Bladder Bladder Bladder Bladder   SpO2: 94% 90% 94% 94%   Weight:       Height:           NEUROLOGICAL EXAM:   He is awake, alert and able to follow commands.  He does not communicate much verbally but was able to tell me his name.  Pupils are equal and reactive.  Extraocular movement intact.  I do not appreciate facial asymmetry.  Facial sensation is intact.  He moves his left upper and lower extremity with good strength and has weakness of right upper and lower extremity.  Sensation appears to be intact.  Coordination cannot be tested.  Deep tendon reflexes are 1+ and equal.    OBJECTIVE:    NEUROIMAGING:    DX-CHEST-PORTABLE (1 VIEW)   Final Result      1.  No focal pulmonary consolidation.      2.  Extubation      DX-ABDOMEN FOR TUBE PLACEMENT   Final Result      Enteric tube tip projects over the stomach.      DX-CHEST-PORTABLE (1 VIEW)   Final Result      No significant change from prior exam.      DX-ABDOMEN FOR TUBE PLACEMENT   Final Result      Enteric tube tip projects over the distal stomach.      DX-CHEST-PORTABLE (1 VIEW)   Final Result      No significant change from prior exam.      DX-ABDOMEN FOR TUBE PLACEMENT   Final Result      Enteric tube terminates over the stomach.      DX-CHEST-PORTABLE (1 VIEW)   Final Result      Increasing left basilar opacity could be from atelectasis or consolidation      DX-CHEST-PORTABLE (1 VIEW)   Final Result      Stable chest except findings  with bibasilar atelectasis.      DX-CHEST-PORTABLE (1 VIEW)   Final Result      Stable chest x-ray findings.      DX-CHEST-PORTABLE (1 VIEW)   Final Result      Stable hypoventilatory chest with stable hazy left basilar opacity could be from atelectasis or consolidation favored over pleural fluid      DX-CHEST-PORTABLE (1 VIEW)   Final Result         1. No significant interval change.      US-DUPLEX TESTICLE COMPLETE (COMBO)   Final Result      Large bilateral hydroceles, right more than left.      Unremarkable bilateral testes.      DX-CHEST-PORTABLE (1 VIEW)   Final Result         1. No significant interval change.      MR-BRAIN-WITH & W/O   Final Result      1.  No acute hemorrhage or ischemia   2.  Two areas of enhancement in the LEFT parietal tumor resection bed which could be posttherapeutic change or indicative of residual or recurrent glioblastoma. Direct comparison with prior imaging would be helpful to further assess.   3.  3 mm of LEFT-to-RIGHT midline shift   4.  Atrophy   5.  White matter changes      DX-ABDOMEN FOR TUBE PLACEMENT   Final Result      Cortrak feeding tube tip projects in the region of the distal stomach.      DX-CHEST-LIMITED (1 VIEW)   Final Result      1.  New right subclavian vein central line tip projects in satisfactory position. No pneumothorax.      2.  Satisfactory position of endotracheal tube.      3.  Feeding tube is now present.      4.  No focal pulmonary consolidation.      DX-CHEST-PORTABLE (1 VIEW)   Final Result      1.  Interval placement of an endotracheal tube which terminates in satisfactory position at the level of the aortic arch.   2.  Mild right basilar atelectasis and/or consolidation.      DX-CHEST-PORTABLE (1 VIEW)   Final Result      No radiographic evidence of acute cardiopulmonary process.      CT-CTA NECK WITH & W/O-POST PROCESSING   Final Result      CT angiogram of the neck within normal limits.      CT-CTA HEAD WITH & W/O-POST PROCESS   Final Result       Left parietal mass resection with some blood vessels along the  operative bed margins. These could indicate healing response although local recurrence is also possible. Comparison with prior studies and correlation with operative history may prove    helpful to clarify      No acute arterial occlusion is identified      CT-HEAD W/O   Final Result      No acute intracranial hemorrhage is identified.      Left parietal vertex craniotomy with underlying vasogenic edema and mass with associated mild rightward midline shift, moderate left lateral ventricular effacement          MEDICATIONS:  Current Facility-Administered Medications   Medication Dose Route Frequency Provider Last Rate Last Dose   • insulin regular (HUMULIN R) injection 1-6 Units  1-6 Units Subcutaneous Q6HRS Jeremy M Gonda, M.D.   1 Units at 05/25/20 0513    And   • dextrose 50% (D50W) injection 50 mL  50 mL Intravenous Q15 MIN PRN Jeremy M Gonda, M.D.       • potassium bicarbonate (KLYTE) effervescent tablet 50 mEq  50 mEq Enteral Tube DAILY Nik Jones M.D.   50 mEq at 05/25/20 0512   • lacosamide (VIMPAT) tablet 300 mg  300 mg Enteral Tube BID Nik Jones M.D.   300 mg at 05/25/20 0519   • levETIRAcetam (KEPPRA) tablet 1,500 mg  1,500 mg Enteral Tube BID Nik Jones M.D.   1,500 mg at 05/25/20 0512   • Valproate Sodium (DEPAKENE) oral solution 750 mg  750 mg Enteral Tube BID Nik Jones M.D.   750 mg at 05/25/20 0512   • dexamethasone (DECADRON) injection 2 mg  2 mg Intravenous Q12HRS Nik Jones M.D.   2 mg at 05/25/20 0512   • amLODIPine (NORVASC) tablet 5 mg  5 mg Enteral Tube Q DAY Nik Jones M.D.   5 mg at 05/25/20 0512   • scopolamine (TRANSDERM-SCOP) patch 1 Patch  1 Patch Transdermal Q72HRS Nik Jones M.D.   1 Patch at 05/22/20 1144   • enoxaparin (LOVENOX) inj 40 mg  40 mg Subcutaneous DAILY Colton Perez M.D.   40 mg at 05/25/20 0512   • Pharmacy Consult: Enteral tube insertion - review meds/change  route/product selection  1 Each Other PHARMACY TO DOSE Colton Perez M.D.       • losartan (COZAAR) tablet 100 mg  100 mg Enteral Tube DAILY Colton Perez M.D.   100 mg at 05/24/20 1715   • topiramate (TOPAMAX) tablet 200 mg  200 mg Enteral Tube Q12HRS Colton Perez M.D.   200 mg at 05/25/20 0512   • acetaminophen (TYLENOL) tablet 650 mg  650 mg Enteral Tube Q6HRS PRN Colton Perez M.D.   650 mg at 05/20/20 1937   • ondansetron (ZOFRAN ODT) dispertab 4 mg  4 mg Enteral Tube Q4HRS PRN Colton Perez M.D.       • promethazine (PHENERGAN) tablet 12.5-25 mg  12.5-25 mg Enteral Tube Q4HRS PRN Colton Perez M.D.       • Pharmacy Consult Request ...Pain Management Review 1 Each  1 Each Other PHARMACY TO DOSE Colton Perez M.D.        And   • oxyCODONE immediate-release (ROXICODONE) tablet 2.5 mg  2.5 mg Enteral Tube Q3HRS PRN Colton Perez M.D.        And   • oxyCODONE immediate-release (ROXICODONE) tablet 5 mg  5 mg Enteral Tube Q3HRS PRN Colton Perez M.D.       • Respiratory Therapy Consult   Nebulization Continuous RT Nikhil Erickson D.O.       • ipratropium-albuterol (DUONEB) nebulizer solution  3 mL Nebulization Q2HRS PRN (RT) Nikhil Erickson D.O.       • senna-docusate (PERICOLACE or SENOKOT S) 8.6-50 MG per tablet 2 Tab  2 Tab Enteral Tube BID NICK ReddyOStephie   2 Tab at 05/25/20 0512    And   • polyethylene glycol/lytes (MIRALAX) PACKET 1 Packet  1 Packet Enteral Tube QDAY PRN NICK ReddyO.   1 Packet at 05/23/20 0648    And   • magnesium hydroxide (MILK OF MAGNESIA) suspension 30 mL  30 mL Enteral Tube QDAY PRN NICK ReddyO.   30 mL at 05/24/20 0510    And   • bisacodyl (DULCOLAX) suppository 10 mg  10 mg Rectal QDAY PRN NICK ReddyO.   10 mg at 05/25/20 0512   • MD Alert...ICU Electrolyte Replacement per Pharmacy   Other PHARMACY TO DOSE Nikhil Erickson D.O.       • ondansetron (ZOFRAN) syringe/vial injection 4 mg  4 mg Intravenous Q4HRS PRN Igor Carlos M.D.       • promethazine (PHENERGAN) suppository  12.5-25 mg  12.5-25 mg Rectal Q4HRS PRN Igor Carlos M.D.       • prochlorperazine (COMPAZINE) injection 5-10 mg  5-10 mg Intravenous Q4HRS PRN Igor Carlos M.D.       • LORazepam (ATIVAN) injection 4 mg  4 mg Intravenous Q10 MIN PRN Igor Carlos M.D.   4 mg at 05/15/20 1545   • hydrALAZINE (APRESOLINE) injection 10 mg  10 mg Intravenous Q6HRS PRN Guido Pierre M.D.   10 mg at 05/22/20 0208       LABS:      Recent Labs     05/23/20  0255 05/24/20  0206 05/25/20  0015   WBC 9.8 10.2 12.1*   RBC 4.04* 3.90* 4.26*   HEMOGLOBIN 12.5* 12.3* 13.3*   HEMATOCRIT 40.6* 39.2* 41.5*   .5* 100.5* 97.4   MCH 30.9 31.5 31.2   MCHC 30.8* 31.4* 32.0*   RDW 59.5* 58.6* 53.7*   PLATELETCT 162* 163* 170   MPV 10.4 10.4 10.1     Recent Labs     05/23/20  0255 05/24/20  0206 05/25/20  0015   SODIUM 149* 150* 150*   POTASSIUM 3.8 3.7 3.7   CHLORIDE 119* 115* 115*   CO2 21 23 24   GLUCOSE 203* 213* 208*   BUN 55* 54* 52*     INR   Date Value Ref Range Status   05/13/2020 0.87 0.87 - 1.13 Final     Comment:     INR - Non-therapeutic Reference Range: 0.87-1.13  INR - Therapeutic Reference Range: 2.0-4.0       No results found for: POCINR  Lab Results   Component Value Date/Time    CREATININE 0.81 05/25/2020 0015     Lab Results   Component Value Date/Time    IFAFRICA >60 05/25/2020 0015    IFNOTAFR >60 05/25/2020 0015     EEG (5/22/2020:  This is an abnormal video EEG recording in a sedated patient. There is slowing in the left hemisphere. There is slowing in the left hemisphere. Continuous left posterior quadrant spikes/polyspikes and sharps noted with frequent but brief runs of left lateralized periodic discharges. No clear seizures captured.  The patient remains at high risk for seizure recurrence. The findings suggest a large underlying area of cortical irritability and structural abnormality. Clinical and radiological correlation is recommended.    ASSESSMENT AND PLAN:  60-year-old male with history of glioblastoma  multiform and status post surgical resection who was admitted on 5/15/20 for evaluation of status epilepticus.  He was successfully extubated yesterday and no more seizure activity reported.  He will continue with Keppra 1500 mg twice a day, Vimpat 300 mg twice a day, valproic acid 750 mg twice a day and Topamax 200 mg twice a day.  We will continue with Decadron for his vasogenic edema.  Neurology will follow as needed, please call me if there is any question.   (4) Less than 3 years old